# Patient Record
Sex: FEMALE | Race: WHITE | NOT HISPANIC OR LATINO
[De-identification: names, ages, dates, MRNs, and addresses within clinical notes are randomized per-mention and may not be internally consistent; named-entity substitution may affect disease eponyms.]

---

## 2017-04-09 ENCOUNTER — TRANSCRIPTION ENCOUNTER (OUTPATIENT)
Age: 82
End: 2017-04-09

## 2017-04-09 ENCOUNTER — EMERGENCY (EMERGENCY)
Facility: HOSPITAL | Age: 82
LOS: 1 days | Discharge: HOME | End: 2017-04-09
Attending: EMERGENCY MEDICINE | Admitting: EMERGENCY MEDICINE
Payer: COMMERCIAL

## 2017-04-09 VITALS
HEART RATE: 69 BPM | HEIGHT: 63 IN | RESPIRATION RATE: 16 BRPM | TEMPERATURE: 99 F | OXYGEN SATURATION: 95 % | WEIGHT: 139.99 LBS | DIASTOLIC BLOOD PRESSURE: 53 MMHG | SYSTOLIC BLOOD PRESSURE: 99 MMHG

## 2017-04-09 VITALS
HEART RATE: 78 BPM | OXYGEN SATURATION: 99 % | RESPIRATION RATE: 16 BRPM | DIASTOLIC BLOOD PRESSURE: 62 MMHG | TEMPERATURE: 99 F | SYSTOLIC BLOOD PRESSURE: 102 MMHG

## 2017-04-09 DIAGNOSIS — Z90.49 ACQUIRED ABSENCE OF OTHER SPECIFIED PARTS OF DIGESTIVE TRACT: Chronic | ICD-10-CM

## 2017-04-09 DIAGNOSIS — I10 ESSENTIAL (PRIMARY) HYPERTENSION: ICD-10-CM

## 2017-04-09 DIAGNOSIS — E03.9 HYPOTHYROIDISM, UNSPECIFIED: ICD-10-CM

## 2017-04-09 DIAGNOSIS — R50.9 FEVER, UNSPECIFIED: ICD-10-CM

## 2017-04-09 DIAGNOSIS — Z90.710 ACQUIRED ABSENCE OF BOTH CERVIX AND UTERUS: Chronic | ICD-10-CM

## 2017-04-09 DIAGNOSIS — J40 BRONCHITIS, NOT SPECIFIED AS ACUTE OR CHRONIC: ICD-10-CM

## 2017-04-09 DIAGNOSIS — Z98.89 OTHER SPECIFIED POSTPROCEDURAL STATES: Chronic | ICD-10-CM

## 2017-04-09 DIAGNOSIS — Z88.2 ALLERGY STATUS TO SULFONAMIDES: ICD-10-CM

## 2017-04-09 DIAGNOSIS — Z88.0 ALLERGY STATUS TO PENICILLIN: ICD-10-CM

## 2017-04-09 DIAGNOSIS — Z79.82 LONG TERM (CURRENT) USE OF ASPIRIN: ICD-10-CM

## 2017-04-09 LAB
ALBUMIN SERPL ELPH-MCNC: 3 G/DL — LOW (ref 3.3–5)
ALP SERPL-CCNC: 66 U/L — SIGNIFICANT CHANGE UP (ref 40–120)
ALT FLD-CCNC: 19 U/L — SIGNIFICANT CHANGE UP (ref 12–78)
ANION GAP SERPL CALC-SCNC: 8 MMOL/L — SIGNIFICANT CHANGE UP (ref 5–17)
ANISOCYTOSIS BLD QL: SLIGHT — SIGNIFICANT CHANGE UP
APPEARANCE UR: CLEAR — SIGNIFICANT CHANGE UP
AST SERPL-CCNC: 32 U/L — SIGNIFICANT CHANGE UP (ref 15–37)
BACTERIA # UR AUTO: ABNORMAL
BILIRUB SERPL-MCNC: 0.4 MG/DL — SIGNIFICANT CHANGE UP (ref 0.2–1.2)
BILIRUB UR-MCNC: NEGATIVE — SIGNIFICANT CHANGE UP
BUN SERPL-MCNC: 16 MG/DL — SIGNIFICANT CHANGE UP (ref 7–23)
CALCIUM SERPL-MCNC: 8.6 MG/DL — SIGNIFICANT CHANGE UP (ref 8.5–10.1)
CHLORIDE SERPL-SCNC: 103 MMOL/L — SIGNIFICANT CHANGE UP (ref 96–108)
CO2 SERPL-SCNC: 28 MMOL/L — SIGNIFICANT CHANGE UP (ref 22–31)
COLOR SPEC: YELLOW — SIGNIFICANT CHANGE UP
CREAT SERPL-MCNC: 0.82 MG/DL — SIGNIFICANT CHANGE UP (ref 0.5–1.3)
DIFF PNL FLD: ABNORMAL
ELLIPTOCYTES BLD QL SMEAR: SLIGHT — SIGNIFICANT CHANGE UP
EPI CELLS # UR: SIGNIFICANT CHANGE UP
GLUCOSE SERPL-MCNC: 95 MG/DL — SIGNIFICANT CHANGE UP (ref 70–99)
GLUCOSE UR QL: NEGATIVE — SIGNIFICANT CHANGE UP
HCT VFR BLD CALC: 40 % — SIGNIFICANT CHANGE UP (ref 34.5–45)
HGB BLD-MCNC: 13.3 G/DL — SIGNIFICANT CHANGE UP (ref 11.5–15.5)
HYPOCHROMIA BLD QL: SLIGHT — SIGNIFICANT CHANGE UP
INR BLD: 1.08 RATIO — SIGNIFICANT CHANGE UP (ref 0.88–1.16)
KETONES UR-MCNC: NEGATIVE — SIGNIFICANT CHANGE UP
LACTATE SERPL-SCNC: 1 MMOL/L — SIGNIFICANT CHANGE UP (ref 0.7–2)
LEUKOCYTE ESTERASE UR-ACNC: NEGATIVE — SIGNIFICANT CHANGE UP
LG PLATELETS BLD QL AUTO: SLIGHT — SIGNIFICANT CHANGE UP
LYMPHOCYTES # BLD AUTO: 17 % — SIGNIFICANT CHANGE UP (ref 13–44)
MCHC RBC-ENTMCNC: 32.2 PG — SIGNIFICANT CHANGE UP (ref 27–34)
MCHC RBC-ENTMCNC: 33.1 GM/DL — SIGNIFICANT CHANGE UP (ref 32–36)
MCV RBC AUTO: 97.3 FL — SIGNIFICANT CHANGE UP (ref 80–100)
MONOCYTES NFR BLD AUTO: 9 % — SIGNIFICANT CHANGE UP (ref 1–9)
NEUTROPHILS NFR BLD AUTO: 74 % — SIGNIFICANT CHANGE UP (ref 43–77)
NITRITE UR-MCNC: NEGATIVE — SIGNIFICANT CHANGE UP
PH UR: 5 — SIGNIFICANT CHANGE UP (ref 4.8–8)
PLAT MORPH BLD: NORMAL — SIGNIFICANT CHANGE UP
PLATELET # BLD AUTO: 204 K/UL — SIGNIFICANT CHANGE UP (ref 150–400)
POIKILOCYTOSIS BLD QL AUTO: SLIGHT — SIGNIFICANT CHANGE UP
POLYCHROMASIA BLD QL SMEAR: SLIGHT — SIGNIFICANT CHANGE UP
POTASSIUM SERPL-MCNC: 4 MMOL/L — SIGNIFICANT CHANGE UP (ref 3.5–5.3)
POTASSIUM SERPL-SCNC: 4 MMOL/L — SIGNIFICANT CHANGE UP (ref 3.5–5.3)
PROT SERPL-MCNC: 7.4 G/DL — SIGNIFICANT CHANGE UP (ref 6–8.3)
PROT UR-MCNC: NEGATIVE — SIGNIFICANT CHANGE UP
PROTHROM AB SERPL-ACNC: 11.8 SEC — SIGNIFICANT CHANGE UP (ref 9.8–12.7)
RAPID RVP RESULT: SIGNIFICANT CHANGE UP
RBC # BLD: 4.12 M/UL — SIGNIFICANT CHANGE UP (ref 3.8–5.2)
RBC # FLD: 12.5 % — SIGNIFICANT CHANGE UP (ref 10.3–14.5)
RBC BLD AUTO: SIGNIFICANT CHANGE UP
RBC CASTS # UR COMP ASSIST: SIGNIFICANT CHANGE UP /HPF (ref 0–4)
SODIUM SERPL-SCNC: 139 MMOL/L — SIGNIFICANT CHANGE UP (ref 135–145)
SP GR SPEC: 1 — LOW (ref 1.01–1.02)
UROBILINOGEN FLD QL: NEGATIVE — SIGNIFICANT CHANGE UP
WBC # BLD: 14.7 K/UL — HIGH (ref 3.8–10.5)
WBC # FLD AUTO: 14.7 K/UL — HIGH (ref 3.8–10.5)
WBC UR QL: SIGNIFICANT CHANGE UP

## 2017-04-09 PROCEDURE — 99284 EMERGENCY DEPT VISIT MOD MDM: CPT

## 2017-04-09 PROCEDURE — 99284 EMERGENCY DEPT VISIT MOD MDM: CPT | Mod: 25

## 2017-04-09 PROCEDURE — 87798 DETECT AGENT NOS DNA AMP: CPT

## 2017-04-09 PROCEDURE — 87040 BLOOD CULTURE FOR BACTERIA: CPT

## 2017-04-09 PROCEDURE — 87581 M.PNEUMON DNA AMP PROBE: CPT

## 2017-04-09 PROCEDURE — 71020: CPT | Mod: 26

## 2017-04-09 PROCEDURE — 87086 URINE CULTURE/COLONY COUNT: CPT

## 2017-04-09 PROCEDURE — 83605 ASSAY OF LACTIC ACID: CPT

## 2017-04-09 PROCEDURE — 87633 RESP VIRUS 12-25 TARGETS: CPT

## 2017-04-09 PROCEDURE — 85610 PROTHROMBIN TIME: CPT

## 2017-04-09 PROCEDURE — 94640 AIRWAY INHALATION TREATMENT: CPT

## 2017-04-09 PROCEDURE — 71046 X-RAY EXAM CHEST 2 VIEWS: CPT

## 2017-04-09 PROCEDURE — 81001 URINALYSIS AUTO W/SCOPE: CPT

## 2017-04-09 PROCEDURE — 80053 COMPREHEN METABOLIC PANEL: CPT

## 2017-04-09 PROCEDURE — 85027 COMPLETE CBC AUTOMATED: CPT

## 2017-04-09 PROCEDURE — 87486 CHLMYD PNEUM DNA AMP PROBE: CPT

## 2017-04-09 RX ORDER — IPRATROPIUM/ALBUTEROL SULFATE 18-103MCG
3 AEROSOL WITH ADAPTER (GRAM) INHALATION ONCE
Qty: 0 | Refills: 0 | Status: COMPLETED | OUTPATIENT
Start: 2017-04-09 | End: 2017-04-09

## 2017-04-09 RX ORDER — SODIUM CHLORIDE 9 MG/ML
1000 INJECTION INTRAMUSCULAR; INTRAVENOUS; SUBCUTANEOUS ONCE
Qty: 0 | Refills: 0 | Status: COMPLETED | OUTPATIENT
Start: 2017-04-09 | End: 2017-04-09

## 2017-04-09 RX ADMIN — Medication 3 MILLILITER(S): at 17:14

## 2017-04-09 RX ADMIN — SODIUM CHLORIDE 1000 MILLILITER(S): 9 INJECTION INTRAMUSCULAR; INTRAVENOUS; SUBCUTANEOUS at 17:18

## 2017-04-09 RX ADMIN — SODIUM CHLORIDE 1000 MILLILITER(S): 9 INJECTION INTRAMUSCULAR; INTRAVENOUS; SUBCUTANEOUS at 19:24

## 2017-04-09 NOTE — ED ADULT TRIAGE NOTE - CHIEF COMPLAINT QUOTE
pt sent from urgent care, pt with cough/fever/weakness, on z pack without improvement, given Tylenol at 1400

## 2017-04-09 NOTE — ED PROVIDER NOTE - OBJECTIVE STATEMENT
85 female presents to ER with daughter, sent from urgent care for evaluation of cough, fever for the past 5 days. Patient had gone to her PMD on Thursday placed on Z-pack.

## 2017-04-09 NOTE — ED PROVIDER NOTE - PROGRESS NOTE DETAILS
patient feeling better, called Dr. Azar PMD/infectious disease, case discussed, aware of elevated white count 14.7, states to continue z-pack, to give cough medication, will see in office tomorrow, patient agrees, understands to return to ER if having elevated fever, difficulty breathing or worsening of symptoms

## 2017-04-10 ENCOUNTER — INPATIENT (INPATIENT)
Facility: HOSPITAL | Age: 82
LOS: 2 days | Discharge: HOME | DRG: 871 | End: 2017-04-13
Attending: HOSPITALIST | Admitting: HOSPITALIST
Payer: COMMERCIAL

## 2017-04-10 VITALS
TEMPERATURE: 98 F | WEIGHT: 130.07 LBS | OXYGEN SATURATION: 99 % | RESPIRATION RATE: 16 BRPM | DIASTOLIC BLOOD PRESSURE: 64 MMHG | HEIGHT: 62 IN | HEART RATE: 74 BPM | SYSTOLIC BLOOD PRESSURE: 137 MMHG

## 2017-04-10 DIAGNOSIS — Z90.710 ACQUIRED ABSENCE OF BOTH CERVIX AND UTERUS: Chronic | ICD-10-CM

## 2017-04-10 DIAGNOSIS — A41.9 SEPSIS, UNSPECIFIED ORGANISM: ICD-10-CM

## 2017-04-10 DIAGNOSIS — E03.9 HYPOTHYROIDISM, UNSPECIFIED: ICD-10-CM

## 2017-04-10 DIAGNOSIS — I10 ESSENTIAL (PRIMARY) HYPERTENSION: ICD-10-CM

## 2017-04-10 DIAGNOSIS — Z98.89 OTHER SPECIFIED POSTPROCEDURAL STATES: Chronic | ICD-10-CM

## 2017-04-10 DIAGNOSIS — Z79.01 LONG TERM (CURRENT) USE OF ANTICOAGULANTS: ICD-10-CM

## 2017-04-10 DIAGNOSIS — Z90.49 ACQUIRED ABSENCE OF OTHER SPECIFIED PARTS OF DIGESTIVE TRACT: Chronic | ICD-10-CM

## 2017-04-10 LAB
ALBUMIN SERPL ELPH-MCNC: 2.9 G/DL — LOW (ref 3.3–5)
ALP SERPL-CCNC: 65 U/L — SIGNIFICANT CHANGE UP (ref 40–120)
ALT FLD-CCNC: 18 U/L — SIGNIFICANT CHANGE UP (ref 12–78)
ANION GAP SERPL CALC-SCNC: 9 MMOL/L — SIGNIFICANT CHANGE UP (ref 5–17)
AST SERPL-CCNC: 32 U/L — SIGNIFICANT CHANGE UP (ref 15–37)
BASOPHILS # BLD AUTO: 0.2 K/UL — SIGNIFICANT CHANGE UP (ref 0–0.2)
BASOPHILS NFR BLD AUTO: 1.1 % — SIGNIFICANT CHANGE UP (ref 0–2)
BILIRUB SERPL-MCNC: 0.4 MG/DL — SIGNIFICANT CHANGE UP (ref 0.2–1.2)
BUN SERPL-MCNC: 13 MG/DL — SIGNIFICANT CHANGE UP (ref 7–23)
CALCIUM SERPL-MCNC: 8.3 MG/DL — LOW (ref 8.5–10.1)
CHLORIDE SERPL-SCNC: 103 MMOL/L — SIGNIFICANT CHANGE UP (ref 96–108)
CO2 SERPL-SCNC: 27 MMOL/L — SIGNIFICANT CHANGE UP (ref 22–31)
CREAT SERPL-MCNC: 0.62 MG/DL — SIGNIFICANT CHANGE UP (ref 0.5–1.3)
EOSINOPHIL # BLD AUTO: 0.2 K/UL — SIGNIFICANT CHANGE UP (ref 0–0.5)
EOSINOPHIL NFR BLD AUTO: 1 % — SIGNIFICANT CHANGE UP (ref 0–6)
GLUCOSE SERPL-MCNC: 92 MG/DL — SIGNIFICANT CHANGE UP (ref 70–99)
HCT VFR BLD CALC: 36.6 % — SIGNIFICANT CHANGE UP (ref 34.5–45)
HGB BLD-MCNC: 12 G/DL — SIGNIFICANT CHANGE UP (ref 11.5–15.5)
INR BLD: 1.14 RATIO — SIGNIFICANT CHANGE UP (ref 0.88–1.16)
LACTATE SERPL-SCNC: 0.9 MMOL/L — SIGNIFICANT CHANGE UP (ref 0.7–2)
LACTATE SERPL-SCNC: 1.9 MMOL/L — SIGNIFICANT CHANGE UP (ref 0.7–2)
LYMPHOCYTES # BLD AUTO: 14.9 % — SIGNIFICANT CHANGE UP (ref 13–44)
LYMPHOCYTES # BLD AUTO: 2.5 K/UL — SIGNIFICANT CHANGE UP (ref 1–3.3)
MCHC RBC-ENTMCNC: 32.1 PG — SIGNIFICANT CHANGE UP (ref 27–34)
MCHC RBC-ENTMCNC: 32.8 GM/DL — SIGNIFICANT CHANGE UP (ref 32–36)
MCV RBC AUTO: 97.9 FL — SIGNIFICANT CHANGE UP (ref 80–100)
MONOCYTES # BLD AUTO: 1.9 K/UL — HIGH (ref 0–0.9)
MONOCYTES NFR BLD AUTO: 11.7 % — HIGH (ref 1–9)
NEUTROPHILS # BLD AUTO: 11.8 K/UL — HIGH (ref 1.8–7.4)
NEUTROPHILS NFR BLD AUTO: 71.2 % — SIGNIFICANT CHANGE UP (ref 43–77)
PLATELET # BLD AUTO: 211 K/UL — SIGNIFICANT CHANGE UP (ref 150–400)
POTASSIUM SERPL-MCNC: 4.1 MMOL/L — SIGNIFICANT CHANGE UP (ref 3.5–5.3)
POTASSIUM SERPL-SCNC: 4.1 MMOL/L — SIGNIFICANT CHANGE UP (ref 3.5–5.3)
PROT SERPL-MCNC: 6.9 G/DL — SIGNIFICANT CHANGE UP (ref 6–8.3)
PROTHROM AB SERPL-ACNC: 12.5 SEC — SIGNIFICANT CHANGE UP (ref 9.8–12.7)
RBC # BLD: 3.73 M/UL — LOW (ref 3.8–5.2)
RBC # FLD: 12.8 % — SIGNIFICANT CHANGE UP (ref 10.3–14.5)
SODIUM SERPL-SCNC: 139 MMOL/L — SIGNIFICANT CHANGE UP (ref 135–145)
WBC # BLD: 16.6 K/UL — HIGH (ref 3.8–10.5)
WBC # FLD AUTO: 16.6 K/UL — HIGH (ref 3.8–10.5)

## 2017-04-10 PROCEDURE — 93010 ELECTROCARDIOGRAM REPORT: CPT

## 2017-04-10 PROCEDURE — 99285 EMERGENCY DEPT VISIT HI MDM: CPT

## 2017-04-10 PROCEDURE — 99223 1ST HOSP IP/OBS HIGH 75: CPT | Mod: AI,GC

## 2017-04-10 RX ORDER — AZTREONAM 2 G
1000 VIAL (EA) INJECTION EVERY 8 HOURS
Qty: 0 | Refills: 0 | Status: DISCONTINUED | OUTPATIENT
Start: 2017-04-10 | End: 2017-04-13

## 2017-04-10 RX ORDER — VANCOMYCIN HCL 1 G
1000 VIAL (EA) INTRAVENOUS ONCE
Qty: 0 | Refills: 0 | Status: COMPLETED | OUTPATIENT
Start: 2017-04-10 | End: 2017-04-10

## 2017-04-10 RX ORDER — VANCOMYCIN HCL 1 G
1000 VIAL (EA) INTRAVENOUS EVERY 12 HOURS
Qty: 0 | Refills: 0 | Status: DISCONTINUED | OUTPATIENT
Start: 2017-04-10 | End: 2017-04-12

## 2017-04-10 RX ORDER — LEVOTHYROXINE SODIUM 125 MCG
50 TABLET ORAL DAILY
Qty: 0 | Refills: 0 | Status: DISCONTINUED | OUTPATIENT
Start: 2017-04-10 | End: 2017-04-13

## 2017-04-10 RX ORDER — LISINOPRIL 2.5 MG/1
10 TABLET ORAL DAILY
Qty: 0 | Refills: 0 | Status: DISCONTINUED | OUTPATIENT
Start: 2017-04-10 | End: 2017-04-13

## 2017-04-10 RX ORDER — IPRATROPIUM/ALBUTEROL SULFATE 18-103MCG
3 AEROSOL WITH ADAPTER (GRAM) INHALATION ONCE
Qty: 0 | Refills: 0 | Status: COMPLETED | OUTPATIENT
Start: 2017-04-10 | End: 2017-04-10

## 2017-04-10 RX ORDER — ENOXAPARIN SODIUM 100 MG/ML
40 INJECTION SUBCUTANEOUS EVERY 24 HOURS
Qty: 0 | Refills: 0 | Status: DISCONTINUED | OUTPATIENT
Start: 2017-04-10 | End: 2017-04-13

## 2017-04-10 RX ORDER — ACETAMINOPHEN 500 MG
650 TABLET ORAL ONCE
Qty: 0 | Refills: 0 | Status: COMPLETED | OUTPATIENT
Start: 2017-04-10 | End: 2017-04-10

## 2017-04-10 RX ORDER — AZTREONAM 2 G
1000 VIAL (EA) INJECTION ONCE
Qty: 0 | Refills: 0 | Status: COMPLETED | OUTPATIENT
Start: 2017-04-10 | End: 2017-04-10

## 2017-04-10 RX ORDER — LORATADINE 10 MG/1
10 TABLET ORAL DAILY
Qty: 0 | Refills: 0 | Status: DISCONTINUED | OUTPATIENT
Start: 2017-04-10 | End: 2017-04-13

## 2017-04-10 RX ORDER — METOPROLOL TARTRATE 50 MG
50 TABLET ORAL DAILY
Qty: 0 | Refills: 0 | Status: DISCONTINUED | OUTPATIENT
Start: 2017-04-10 | End: 2017-04-13

## 2017-04-10 RX ORDER — SODIUM CHLORIDE 9 MG/ML
1000 INJECTION INTRAMUSCULAR; INTRAVENOUS; SUBCUTANEOUS ONCE
Qty: 0 | Refills: 0 | Status: COMPLETED | OUTPATIENT
Start: 2017-04-10 | End: 2017-04-10

## 2017-04-10 RX ADMIN — Medication 650 MILLIGRAM(S): at 13:09

## 2017-04-10 RX ADMIN — SODIUM CHLORIDE 1000 MILLILITER(S): 9 INJECTION INTRAMUSCULAR; INTRAVENOUS; SUBCUTANEOUS at 12:53

## 2017-04-10 RX ADMIN — Medication 3 MILLILITER(S): at 13:09

## 2017-04-10 RX ADMIN — Medication 50 MILLIGRAM(S): at 12:55

## 2017-04-10 RX ADMIN — Medication 100 MILLIGRAM(S): at 20:24

## 2017-04-10 RX ADMIN — Medication 250 MILLIGRAM(S): at 13:40

## 2017-04-10 RX ADMIN — Medication 3 MILLILITER(S): at 15:09

## 2017-04-10 RX ADMIN — ENOXAPARIN SODIUM 40 MILLIGRAM(S): 100 INJECTION SUBCUTANEOUS at 18:09

## 2017-04-10 RX ADMIN — Medication 50 MILLIGRAM(S): at 20:51

## 2017-04-10 NOTE — CONSULT NOTE ADULT - SUBJECTIVE AND OBJECTIVE BOX
Patient is a 85y old  Female who presents with a chief complaint of cough and weakness (10 Apr 2017 14:26)      HPI:  85F pmhx hepatitis, hypothyroid, sicca syndrome and htn. c/o of weakness and cough.  Weakness started one week ago, states for the past year has been having symptoms of weakness with increase falls (most recent fall two days ago trying to get out of bed).  States daughter has given her natural energy boost medicines and states it has helped with energy.  States full body fatigue and tenderness progressively worsening with weakness.  No other associated symptoms. Cough also started about a week ago, that progressively worsened, was given antibiotics by dr mcgowan and is on day 3/5 as of today.  States associated throat tenderness in the "glands" with some groin discomfort.  Admits fevers but denies nausea, vomitting or diarrhea.    allergies to penicillin --> rash.    As per ER attending who spoke with PMD, dr mcgowan, was prelim blood culture is gram positive cocci.  Dr mcgowan recommended vanco and zosyn, due to pen allergy, started on vanco and aztreonam. (10 Apr 2017 14:26)      Asked to see patient for ID Consult    Allergies    penicillin (Unknown)  sulfa drugs (Unknown)    Intolerances        MEDICATIONS  (STANDING):  lisinopril 10milliGRAM(s) Oral daily  loratadine 10milliGRAM(s) Oral daily  metoprolol succinate ER 50milliGRAM(s) Oral daily  levothyroxine 50MICROGram(s) Oral daily  enoxaparin Injectable 40milliGRAM(s) SubCutaneous every 24 hours  aztreonam  IVPB 1000milliGRAM(s) IV Intermittent every 8 hours  vancomycin  IVPB 1000milliGRAM(s) IV Intermittent every 12 hours    MEDICATIONS  (PRN):  guaiFENesin    Syrup 100milliGRAM(s) Oral every 6 hours PRN Cough      PAST MEDICAL & SURGICAL HISTORY:  Autoimmune disease  Hepatitis: 20 years ago  Hypothyroid  Essential hypertension  H/O abdominal hysterectomy  History of cholecystectomy  S/P appy      FAMILY HISTORY:  Family history of stroke  Family history of heart disease      Social History    Smoking History:  YES[  ]  NO[ x ]    REVIEW OF SYSTEMS:  All systems below were reviewed and are negative [  ]  Constitutional:weakness  HEENT:  Lymph nodes:  ID:  Pulmonary:dry cough  Cardiac:  GI:  Renal:  Musculoskeletal:pain b/l groin  Neuro:  Endocrine:  Skin:  All other systems above were reviewed and are negative   [ x ]    HOME MEDICATIONS:    MEDICATIONS  (STANDING):  lisinopril 10milliGRAM(s) Oral daily  loratadine 10milliGRAM(s) Oral daily  metoprolol succinate ER 50milliGRAM(s) Oral daily  levothyroxine 50MICROGram(s) Oral daily  enoxaparin Injectable 40milliGRAM(s) SubCutaneous every 24 hours  aztreonam  IVPB 1000milliGRAM(s) IV Intermittent every 8 hours  vancomycin  IVPB 1000milliGRAM(s) IV Intermittent every 12 hours    MEDICATIONS  (PRN):  guaiFENesin    Syrup 100milliGRAM(s) Oral every 6 hours PRN Cough        Vital Signs Last 24 Hrs  T(C): 37.2, Max: 38.2 (04-10 @ 13:00)  T(F): 98.9, Max: 100.7 (04-10 @ 13:00)  HR: 62 (62 - 74)  BP: 113/67 (113/67 - 137/64)  BP(mean): --  RR: 15 (15 - 16)  SpO2: 98% (98% - 99%)    PHYSICAL EXAM:  Constitutional:weak  HEENT:  Neck:  Lymph Nodes:  Lungs:coarse BS b/l  Heart:  Abdomen:  Renal:  Extremities:pain b/l groins  Neurologic:  Vascular:  Neuro:  Endocrine:  Skin:erythema face on admission  Except for written comments, all of above normal [ x ]    I&O's Summary      LABORATORY:    CBC Full  -  ( 10 Apr 2017 12:52 )  WBC Count : 16.6 K/uL  Hemoglobin : 12.0 g/dL  Hematocrit : 36.6 %  Platelet Count - Automated : 211 K/uL  Mean Cell Volume : 97.9 fl  Mean Cell Hemoglobin : 32.1 pg  Mean Cell Hemoglobin Concentration : 32.8 gm/dL  Auto Neutrophil # : 11.8 K/uL  Auto Lymphocyte # : 2.5 K/uL  Auto Monocyte # : 1.9 K/uL  Auto Eosinophil # : 0.2 K/uL  Auto Basophil # : 0.2 K/uL  Auto Neutrophil % : 71.2 %  Auto Lymphocyte % : 14.9 %  Auto Monocyte % : 11.7 %  Auto Eosinophil % : 1.0 %  Auto Basophil % : 1.1 %          04-10    139  |  103  |  13  ----------------------------<  92  4.1   |  27  |  0.62    Ca    8.3<L>      10 Apr 2017 12:52    TPro  6.9  /  Alb  2.9<L>  /  TBili  0.4  /  DBili  x   /  AST  32  /  ALT  18  /  AlkPhos  65  04-10      Urinalysis basic    LABORATORY:    CBC Full  -  ( 10 Apr 2017 12:52 )  WBC Count : 16.6 K/uL  Hemoglobin : 12.0 g/dL  Hematocrit : 36.6 %  Platelet Count - Automated : 211 K/uL  Mean Cell Volume : 97.9 fl  Mean Cell Hemoglobin : 32.1 pg  Mean Cell Hemoglobin Concentration : 32.8 gm/dL  Auto Neutrophil # : 11.8 K/uL  Auto Lymphocyte # : 2.5 K/uL  Auto Monocyte # : 1.9 K/uL  Auto Eosinophil # : 0.2 K/uL  Auto Basophil # : 0.2 K/uL  Auto Neutrophil % : 71.2 %  Auto Lymphocyte % : 14.9 %  Auto Monocyte % : 11.7 %  Auto Eosinophil % : 1.0 %  Auto Basophil % : 1.1 %          04-10    139  |  103  |  13  ----------------------------<  92  4.1   |  27  |  0.62    Ca    8.3<L>      10 Apr 2017 12:52    TPro  6.9  /  Alb  2.9<L>  /  TBili  0.4  /  DBili  x   /  AST  32  /  ALT  18  /  AlkPhos  65  04-10      Rapid Respiratory Viral Panel Result        04-09 @ 21:05  Rapid RVP [facility]  Coronovirus --  Adenovirus --  Bordetella Pertussis --  Chlamydia Pneumonia --  Entero/Rhinovirus--  HKU1 Coronovirus --  HMPV Coronovirus --  Influenza A --  Influenza AH1 --  Influenza AH1 2009 --  Influenza AH3 --  Influenza B --  Mycoplasma Pneumoniae --  NL63 Coronovirus --  OC43 Coronovirus --  Parainfluenza 1 --  Parainfluenza 2 --  Parainfluenza 3 --  Parainfluenza 4 --  Resp Syncytial Virus --          Vanco. trough:  Genta trough:    Radiology:    Microbiology:g+c in blood cx from 4/7

## 2017-04-10 NOTE — H&P ADULT - NSHPLABSRESULTS_GEN_ALL_CORE
EKG: sinus with 1st degree AV block, posible inferior infarct age undetermined. 79bpm. CBC: wbc = 14.7 (4/9/17) --> 16.6 (4/10/17)    Rapid RVP (4/9/17) : not detected;     Xray chest 2 view (4/9/17): IMPRESSION:  Support Devices: None  Heart: Cardiomegaly  Mediastinum: Normal in contour  Lungs/Airways: No consolidation or pleural effusion. Reticular opacities   at the right base, indeterminate for infection.  Musculoskeletal: Normal      EKG (4/10/17): sinus with 1st degree AV block, posible inferior infarct age undetermined. 79bpm.

## 2017-04-10 NOTE — ED ADULT TRIAGE NOTE - CHIEF COMPLAINT QUOTE
C/O Weakness, dehydration and cough. patient was here yesterday for the same. Patient will be admitted as per Dr. Azar.

## 2017-04-10 NOTE — H&P ADULT - FAMILY HISTORY
Mother  Still living? Unknown  Family history of heart disease, Age at diagnosis: Age Unknown     Father  Still living? Unknown  Family history of stroke, Age at diagnosis: Age Unknown

## 2017-04-10 NOTE — PATIENT PROFILE ADULT. - NS TRANSFER PATIENT BELONGINGS
Cell Phone/PDA (specify)/Clothing/Jewelry/Money (specify)/1 necklace,1 miraculous metal
PAIN SCALE 10 OF 10.

## 2017-04-10 NOTE — PATIENT PROFILE ADULT. - NSALCOHOLMD_GEN_A_CORE_SD
Name of MD Notified: (Please Specify)/all questions answered NO by pt, I accidentally checked this box

## 2017-04-10 NOTE — H&P ADULT - HISTORY OF PRESENT ILLNESS
85F pmhx hepatitis, hypothyroid and htn c/o of weakness and cough.  Weakness started one week ago, states for the past year has been having symptoms of weakness with increase falls (most recent fall two days ago trying to get out of bed).  States daughter has given her natural energy boost medicines and states it has helped with energy.  States full body fatigue and tenderness progressively worsening with weakness.  No other associated symptoms. Cough also started about a week ago, that progressively worsened, was given antibiotics by dr mcgowan and is on day 3/5 as of today.  States associated throat tenderness in the "glands" with some groin discomfort.  Admits fevers but denies nausea, vomitting or diarrhea.    allergies to penicillin --> rash 85F pmhx hepatitis, hypothyroid and htn c/o of weakness and cough.  Weakness started one week ago, states for the past year has been having symptoms of weakness with increase falls (most recent fall two days ago trying to get out of bed).  States daughter has given her natural energy boost medicines and states it has helped with energy.  States full body fatigue and tenderness progressively worsening with weakness.  No other associated symptoms. Cough also started about a week ago, that progressively worsened, was given antibiotics by dr mcgowan and is on day 3/5 as of today.  States associated throat tenderness in the "glands" with some groin discomfort.  Admits fevers but denies nausea, vomitting or diarrhea.    allergies to penicillin --> rash.    As per ER attending who spoke with PMD, dr mcgowan, was prelim blood culture is gram positive cocci.  Dr mcgowan recommended vanco and zosyn, due to pen allergy, started on vanco and aztreonam.

## 2017-04-10 NOTE — ED PROVIDER NOTE - OBJECTIVE STATEMENT
85 female sent to ER to be admitted by PMD. Patient has been having productive cough, congestion last week, worsening over the weekend, with fever 100.7F, came to ER yesterday and sent home, today PMD states blood cultures were positive and to start broad spectrum antibiotics.  PMD- Nissa

## 2017-04-10 NOTE — H&P ADULT - PROBLEM SELECTOR PLAN 1
source unknown  1. wbc elevated since ER visit  2. started on aztreonam and vancomycin as recommended by ID (juan m)  3. will f/u cultures

## 2017-04-10 NOTE — H&P ADULT - ASSESSMENT
85F pmhx hepatitis, hypothyroid and htn admitted for sepsis unknown source. 85F pmhx hepatitis, hypothyroid and htn admitted for sepsis/ gram positive bacteremia , unclear source.

## 2017-04-10 NOTE — PATIENT PROFILE ADULT. - FALL HARM RISK
other/bones(Osteoporosis,prev fx,steroid use,metastatic bone ca/s/p fall at home 4/08/2017/age(85 years old or older)

## 2017-04-11 LAB
ANION GAP SERPL CALC-SCNC: 7 MMOL/L — SIGNIFICANT CHANGE UP (ref 5–17)
BASOPHILS # BLD AUTO: 0.2 K/UL — SIGNIFICANT CHANGE UP (ref 0–0.2)
BASOPHILS NFR BLD AUTO: 1 % — SIGNIFICANT CHANGE UP (ref 0–2)
BUN SERPL-MCNC: 7 MG/DL — SIGNIFICANT CHANGE UP (ref 7–23)
CALCIUM SERPL-MCNC: 7.9 MG/DL — LOW (ref 8.5–10.1)
CHLORIDE SERPL-SCNC: 106 MMOL/L — SIGNIFICANT CHANGE UP (ref 96–108)
CO2 SERPL-SCNC: 27 MMOL/L — SIGNIFICANT CHANGE UP (ref 22–31)
CREAT SERPL-MCNC: 0.63 MG/DL — SIGNIFICANT CHANGE UP (ref 0.5–1.3)
CULTURE RESULTS: NO GROWTH — SIGNIFICANT CHANGE UP
EOSINOPHIL # BLD AUTO: 0.2 K/UL — SIGNIFICANT CHANGE UP (ref 0–0.5)
EOSINOPHIL NFR BLD AUTO: 1.6 % — SIGNIFICANT CHANGE UP (ref 0–6)
GLUCOSE SERPL-MCNC: 94 MG/DL — SIGNIFICANT CHANGE UP (ref 70–99)
HCT VFR BLD CALC: 35.9 % — SIGNIFICANT CHANGE UP (ref 34.5–45)
HGB BLD-MCNC: 11.3 G/DL — LOW (ref 11.5–15.5)
LYMPHOCYTES # BLD AUTO: 24.1 % — SIGNIFICANT CHANGE UP (ref 13–44)
LYMPHOCYTES # BLD AUTO: 3.5 K/UL — HIGH (ref 1–3.3)
MCHC RBC-ENTMCNC: 30.6 PG — SIGNIFICANT CHANGE UP (ref 27–34)
MCHC RBC-ENTMCNC: 31.4 GM/DL — LOW (ref 32–36)
MCV RBC AUTO: 97.4 FL — SIGNIFICANT CHANGE UP (ref 80–100)
MONOCYTES # BLD AUTO: 1.3 K/UL — HIGH (ref 0–0.9)
MONOCYTES NFR BLD AUTO: 8.8 % — SIGNIFICANT CHANGE UP (ref 1–9)
NEUTROPHILS # BLD AUTO: 9.4 K/UL — HIGH (ref 1.8–7.4)
NEUTROPHILS NFR BLD AUTO: 64.4 % — SIGNIFICANT CHANGE UP (ref 43–77)
PLATELET # BLD AUTO: 208 K/UL — SIGNIFICANT CHANGE UP (ref 150–400)
POTASSIUM SERPL-MCNC: 4.2 MMOL/L — SIGNIFICANT CHANGE UP (ref 3.5–5.3)
POTASSIUM SERPL-SCNC: 4.2 MMOL/L — SIGNIFICANT CHANGE UP (ref 3.5–5.3)
RBC # BLD: 3.69 M/UL — LOW (ref 3.8–5.2)
RBC # FLD: 12.6 % — SIGNIFICANT CHANGE UP (ref 10.3–14.5)
SODIUM SERPL-SCNC: 140 MMOL/L — SIGNIFICANT CHANGE UP (ref 135–145)
SPECIMEN SOURCE: SIGNIFICANT CHANGE UP
WBC # BLD: 14.6 K/UL — HIGH (ref 3.8–10.5)
WBC # FLD AUTO: 14.6 K/UL — HIGH (ref 3.8–10.5)

## 2017-04-11 PROCEDURE — 99233 SBSQ HOSP IP/OBS HIGH 50: CPT | Mod: GC

## 2017-04-11 RX ORDER — LACTOBACILLUS ACIDOPHILUS 100MM CELL
1 CAPSULE ORAL
Qty: 0 | Refills: 0 | Status: DISCONTINUED | OUTPATIENT
Start: 2017-04-11 | End: 2017-04-13

## 2017-04-11 RX ORDER — ACETAMINOPHEN 500 MG
650 TABLET ORAL ONCE
Qty: 0 | Refills: 0 | Status: COMPLETED | OUTPATIENT
Start: 2017-04-11 | End: 2017-04-11

## 2017-04-11 RX ADMIN — Medication 50 MICROGRAM(S): at 06:36

## 2017-04-11 RX ADMIN — Medication 50 MILLIGRAM(S): at 04:30

## 2017-04-11 RX ADMIN — Medication 250 MILLIGRAM(S): at 02:10

## 2017-04-11 RX ADMIN — ENOXAPARIN SODIUM 40 MILLIGRAM(S): 100 INJECTION SUBCUTANEOUS at 19:01

## 2017-04-11 RX ADMIN — LISINOPRIL 10 MILLIGRAM(S): 2.5 TABLET ORAL at 06:36

## 2017-04-11 RX ADMIN — Medication 50 MILLIGRAM(S): at 13:08

## 2017-04-11 RX ADMIN — Medication 250 MILLIGRAM(S): at 13:05

## 2017-04-11 RX ADMIN — Medication 650 MILLIGRAM(S): at 07:16

## 2017-04-11 RX ADMIN — Medication 50 MILLIGRAM(S): at 06:36

## 2017-04-11 RX ADMIN — Medication 50 MILLIGRAM(S): at 20:53

## 2017-04-11 RX ADMIN — Medication 1 TABLET(S): at 19:00

## 2017-04-11 NOTE — GOALS OF CARE CONVERSATION - PERSONAL ADVANCE DIRECTIVE - CONVERSATION DETAILS
spoke to pt, may have hcp at home, contacted Amina, daughter on phone, no hcp from , will discuss hcp form w pt and may complete while here. Amina has had conversations w pt and know her wishes.  contact # given

## 2017-04-11 NOTE — PROGRESS NOTE ADULT - SUBJECTIVE AND OBJECTIVE BOX
Patient is a 85y old  Female who presents with a chief complaint of cough and weakness (10 Apr 2017 20:36)    Asked to see patient for ID Consult  Interval History:sepsis.weakness. bronchitis    CENTRAL LINE:   [  ] YES       [ x ] NO  FRANKLIN:                 [  ] YES       [ x ] NO     PAST MEDICAL & SURGICAL HISTORY:  Autoimmune disease  Hepatitis: 20 years ago  Hypothyroid  Essential hypertension  H/O abdominal hysterectomy  History of cholecystectomy  S/P appy      REVIEW OF SYSTEMS:  All systems below were reviewed and are negative [  ]  Constitutional:weak  HEENT:  Lymph nodes:  ID:  Pulmonary:dry cough  Cardiac:no CP,SOB  GI:No NVD  Renal:  Musculoskeletal:  Neuro:  Endocrine:  Skin:  All other systems above were reviewed and are negative   [ x ]    Allergies  Allergies    latex (Rash)  penicillin (Unknown)  sulfa drugs (Unknown)    Intolerances        MEDICATIONS  (STANDING):  lisinopril 10milliGRAM(s) Oral daily  loratadine 10milliGRAM(s) Oral daily  metoprolol succinate ER 50milliGRAM(s) Oral daily  levothyroxine 50MICROGram(s) Oral daily  enoxaparin Injectable 40milliGRAM(s) SubCutaneous every 24 hours  aztreonam  IVPB 1000milliGRAM(s) IV Intermittent every 8 hours  vancomycin  IVPB 1000milliGRAM(s) IV Intermittent every 12 hours    MEDICATIONS  (PRN):  guaiFENesin    Syrup 100milliGRAM(s) Oral every 6 hours PRN Cough      Vital Signs Last 24 Hrs  T(C): 38.1, Max: 38.2 (04-10 @ 13:00)  T(F): 100.5, Max: 100.7 (04-10 @ 13:00)  HR: 86 (62 - 86)  BP: 120/65 (113/67 - 137/64)  BP(mean): --  RR: 17 (14 - 17)  SpO2: 93% (93% - 99%)    I&O's Summary    I & Os for current day (as of 2017 08:18)  =============================================  IN: 300 ml / OUT: 0 ml / NET: 300 ml      PHYSICAL EXAM:  Constitutional:weak  HEENT:  Lymph nodes:  Neck:  Lungs:dec. rhonchi  Heart:  Abdomen:  Extremities:  Neurologic:  Vascular:  Neuro:  Endocrine:  Skin:  All of the above normal except for written comments [ x ]    LABORATORY:    CBC Full  -  ( 10 Apr 2017 12:52 )  WBC Count : 16.6 K/uL  Hemoglobin : 12.0 g/dL  Hematocrit : 36.6 %  Platelet Count - Automated : 211 K/uL  Mean Cell Volume : 97.9 fl  Mean Cell Hemoglobin : 32.1 pg  Mean Cell Hemoglobin Concentration : 32.8 gm/dL  Auto Neutrophil # : 11.8 K/uL  Auto Lymphocyte # : 2.5 K/uL  Auto Monocyte # : 1.9 K/uL  Auto Eosinophil # : 0.2 K/uL  Auto Basophil # : 0.2 K/uL  Auto Neutrophil % : 71.2 %  Auto Lymphocyte % : 14.9 %  Auto Monocyte % : 11.7 %  Auto Eosinophil % : 1.0 %  Auto Basophil % : 1.1 %      ESR:                   04-10 @ 19:41  --    C-Reactive Protein:     04-10 @ 19:41  --    Procalcitonin:           04-10 @ 19:41   0.07      10    139  |  103  |  13  ----------------------------<  92  4.1   |  27  |  0.62    Ca    8.3<L>      10 Apr 2017 12:52    TPro  6.9  /  Alb  2.9<L>  /  TBili  0.4  /  DBili  x   /  AST  32  /  ALT  18  /  AlkPhos  65  -10    Procalcitonin 0.07    Rapid Respiratory Viral Panel Result         @ 21:05  Rapid RVP [facility]  Coronovirus --  Adenovirus --  Bordetella Pertussis --  Chlamydia Pneumonia --  Entero/Rhinovirus--  HKU1 Coronovirus --  HMPV Coronovirus --  Influenza A --  Influenza AH1 --  Influenza AH1  --  Influenza AH3 --  Influenza B --  Mycoplasma Pneumoniae --  NL63 Coronovirus --  OC43 Coronovirus --  Parainfluenza 1 --  Parainfluenza 2 --  Parainfluenza 3 --  Parainfluenza 4 --  Resp Syncytial Virus --      Urinalysis Basic  Urinalysis Basic - ( 2017 21:25 )    Color: Yellow / Appearance: Clear / S.005 / pH: x  Gluc: x / Ketone: Negative  / Bili: Negative / Urobili: Negative   Blood: x / Protein: Negative / Nitrite: Negative   Leuk Esterase: Negative / RBC: 0-2 /HPF / WBC 0-2   Sq Epi: x / Non Sq Epi: Few / Bacteria: Few        Vanco. trough:  Genta. trough:      Radiology: No PNA    Microbiology:Pending

## 2017-04-12 LAB
HCT VFR BLD CALC: 35.8 % — SIGNIFICANT CHANGE UP (ref 34.5–45)
HGB BLD-MCNC: 11.2 G/DL — LOW (ref 11.5–15.5)
MCHC RBC-ENTMCNC: 30.7 PG — SIGNIFICANT CHANGE UP (ref 27–34)
MCHC RBC-ENTMCNC: 31.4 GM/DL — LOW (ref 32–36)
MCV RBC AUTO: 97.7 FL — SIGNIFICANT CHANGE UP (ref 80–100)
PLATELET # BLD AUTO: 231 K/UL — SIGNIFICANT CHANGE UP (ref 150–400)
RBC # BLD: 3.67 M/UL — LOW (ref 3.8–5.2)
RBC # FLD: 12.8 % — SIGNIFICANT CHANGE UP (ref 10.3–14.5)
VANCOMYCIN TROUGH SERPL-MCNC: 6.7 UG/ML — LOW (ref 10–20)
WBC # BLD: 14.5 K/UL — HIGH (ref 3.8–10.5)
WBC # FLD AUTO: 14.5 K/UL — HIGH (ref 3.8–10.5)

## 2017-04-12 PROCEDURE — 99233 SBSQ HOSP IP/OBS HIGH 50: CPT | Mod: GC

## 2017-04-12 PROCEDURE — 71250 CT THORAX DX C-: CPT | Mod: 26

## 2017-04-12 RX ADMIN — Medication 50 MICROGRAM(S): at 05:11

## 2017-04-12 RX ADMIN — LORATADINE 10 MILLIGRAM(S): 10 TABLET ORAL at 13:17

## 2017-04-12 RX ADMIN — Medication 50 MILLIGRAM(S): at 13:17

## 2017-04-12 RX ADMIN — Medication 1 TABLET(S): at 17:57

## 2017-04-12 RX ADMIN — Medication 1 TABLET(S): at 08:44

## 2017-04-12 RX ADMIN — Medication 50 MILLIGRAM(S): at 04:00

## 2017-04-12 RX ADMIN — Medication 50 MILLIGRAM(S): at 05:11

## 2017-04-12 RX ADMIN — ENOXAPARIN SODIUM 40 MILLIGRAM(S): 100 INJECTION SUBCUTANEOUS at 17:57

## 2017-04-12 RX ADMIN — Medication 100 MILLIGRAM(S): at 08:46

## 2017-04-12 RX ADMIN — Medication 50 MILLIGRAM(S): at 21:33

## 2017-04-12 RX ADMIN — LISINOPRIL 10 MILLIGRAM(S): 2.5 TABLET ORAL at 05:11

## 2017-04-12 RX ADMIN — Medication 250 MILLIGRAM(S): at 02:00

## 2017-04-12 RX ADMIN — Medication 100 MILLIGRAM(S): at 17:58

## 2017-04-12 NOTE — PROGRESS NOTE ADULT - ATTENDING COMMENTS
Pt. seen and evaluated for fever and pneumonia.  Outpatient blood cx grew out staph epi which is likely contaminant.  Pt. now on IV Azactam and PO doxycycline.  HEENT: EOMI, anicteric sclera, MMM.  Lungs with decreased BS at bases.      Assessment and Plan:  -Fever and pneumonia:  continue with Aztreonam and Doxycycline. ID f/u  -Outpatient gram + cocci blood cx: Staph epidermis contaminant.  -HTN: stable on lisinopril and Toprol XL  -Hypothyroidism: continue synthroid.   -VTE ppx: lovenox 40mg SQ daily

## 2017-04-12 NOTE — PROGRESS NOTE ADULT - PROBLEM SELECTOR PLAN 1
possible 2/2 PNA  started on aztreonam and vancomycin, outpatient blood cx grew staph epi- likely contaminant, vanco D/Julián, continue aztreonam and started doxycycline as recommended by ID (juan m)- continue bacid  Repeat blood cx neg, urine cx negative, RVP negative  CT chest showed infiltrate R middle lobe

## 2017-04-12 NOTE — PROGRESS NOTE ADULT - SUBJECTIVE AND OBJECTIVE BOX
INTERVAL HPI/OVERNIGHT EVENTS: Patient seen and examined at bedside. Patient reports she still has cough and feels weak. Complains of fevers. Patient otherwise denies all other complaints- denies SOB, CP, abd pain, N/V/D/C.    MEDICATIONS  (STANDING):  lisinopril 10milliGRAM(s) Oral daily  loratadine 10milliGRAM(s) Oral daily  metoprolol succinate ER 50milliGRAM(s) Oral daily  levothyroxine 50MICROGram(s) Oral daily  enoxaparin Injectable 40milliGRAM(s) SubCutaneous every 24 hours  aztreonam  IVPB 1000milliGRAM(s) IV Intermittent every 8 hours  lactobacillus acidophilus 1Tablet(s) Oral two times a day with meals  doxycycline hyclate Capsule 100milliGRAM(s) Oral every 12 hours    MEDICATIONS  (PRN):  guaiFENesin    Syrup 100milliGRAM(s) Oral every 6 hours PRN Cough      Allergies    latex (Rash)  penicillin (Unknown)  sulfa drugs (Unknown)    Intolerances        CONSTITUTIONAL: + weakness, + fevers   EYES/ENT: No visual changes;  No vertigo or throat pain   NECK: No pain or stiffness  RESPIRATORY: + cough, no wheezing, hemoptysis; No shortness of breath  CARDIOVASCULAR: No chest pain or palpitations  GASTROINTESTINAL: No abdominal or epigastric pain. No nausea, vomiting, or hematemesis; No diarrhea or constipation. No melena or hematochezia.  GENITOURINARY: No dysuria, frequency or hematuria  NEUROLOGICAL: No numbness or tingling  SKIN: No itching, burning, rashes, or lesions   All other review of systems is negative unless indicated above.    Vital Signs Last 24 Hrs  T(C): 37.7, Max: 38.1 (04-12 @ 04:50)  T(F): 99.8, Max: 100.6 (04-12 @ 04:50)  HR: 70 (70 - 84)  BP: 128/67 (110/58 - 154/84)  BP(mean): --  RR: 16 (16 - 18)  SpO2: 97% (95% - 97%)  I & Os for 24h ending 04-12 @ 07:00  =============================================  IN: 660 ml / OUT: 200 ml / NET: 460 ml    I & Os for current day (as of 04-12 @ 14:45)  =============================================  IN: 50 ml / OUT: 0 ml / NET: 50 ml      General: WN/WD NAD  Neurology: A&Ox3, nonfocal, FLORES x 4  Respiratory: decreased breath sounds R base  CV: RRR, S1S2, no murmurs, rubs or gallops  Abdominal: Soft, NT, ND +BS  Extremities: No edema, + peripheral pulses      LABS:                        11.2   14.5  )-----------( 231      ( 12 Apr 2017 06:33 )             35.8     04-11    140  |  106  |  7   ----------------------------<  94  4.2   |  27  |  0.63    Ca    7.9<L>      11 Apr 2017 07:51            RADIOLOGY & ADDITIONAL TESTS:

## 2017-04-12 NOTE — PROGRESS NOTE ADULT - ASSESSMENT
85F pmhx hepatitis, hypothyroid and htn admitted for sepsis/ gram positive bacteremia , unclear source.

## 2017-04-12 NOTE — PROGRESS NOTE ADULT - SUBJECTIVE AND OBJECTIVE BOX
Patient is a 85y old  Female who presents with a chief complaint of cough and weakness (10 Apr 2017 20:36)    Asked to see patient for ID Consult  Interval History:weakness, cough    CENTRAL LINE:   [  ] YES       [x  ] NO  FRANKLIN:                 [  ] YES       [x  ] NO     PAST MEDICAL & SURGICAL HISTORY:  Autoimmune disease  Hepatitis: 20 years ago  Hypothyroid  Essential hypertension  H/O abdominal hysterectomy  History of cholecystectomy  S/P appy      REVIEW OF SYSTEMS:  All systems below were reviewed and are negative [  ]  Constitutional:weakness  HEENT:  Lymph nodes:  ID:  Pulmonary:dry cough. no CP, sob  Cardiac:  GI:No NVD  Renal:  Musculoskeletal:  Neuro:  Endocrine:  Skin:  All other systems above were reviewed and are negative   [x  ]    Allergies  Allergies    latex (Rash)  penicillin (Unknown)  sulfa drugs (Unknown)    Intolerances        MEDICATIONS  (STANDING):  lisinopril 10milliGRAM(s) Oral daily  loratadine 10milliGRAM(s) Oral daily  metoprolol succinate ER 50milliGRAM(s) Oral daily  levothyroxine 50MICROGram(s) Oral daily  enoxaparin Injectable 40milliGRAM(s) SubCutaneous every 24 hours  aztreonam  IVPB 1000milliGRAM(s) IV Intermittent every 8 hours  lactobacillus acidophilus 1Tablet(s) Oral two times a day with meals  doxycycline hyclate Capsule 100milliGRAM(s) Oral every 12 hours    MEDICATIONS  (PRN):  guaiFENesin    Syrup 100milliGRAM(s) Oral every 6 hours PRN Cough      Vital Signs Last 24 Hrs  T(C): 38.1, Max: 38.1 (04-12 @ 04:50)  T(F): 100.6, Max: 100.6 (04-12 @ 04:50)  HR: 77 (64 - 84)  BP: 154/84 (110/58 - 154/84)  BP(mean): --  RR: 16 (16 - 18)  SpO2: 95% (95% - 99%)    I&O's Summary    I & Os for current day (as of 12 Apr 2017 07:40)  =============================================  IN: 660 ml / OUT: 200 ml / NET: 460 ml      PHYSICAL EXAM:  Constitutional:weak  HEENT:  Lymph nodes:  Neck:  Lungs:b/l rhonchi  Heart:  Abdomen:soft  Extremities:  Neurologic:  Vascular:  Neuro:  Endocrine:  Skin:  All of the above normal except for written comments [x  ]    LABORATORY:    CBC Full  -  ( 12 Apr 2017 06:33 )  WBC Count : 14.5 K/uL  Hemoglobin : 11.2 g/dL  Hematocrit : 35.8 %  Platelet Count - Automated : 231 K/uL  Mean Cell Volume : 97.7 fl  Mean Cell Hemoglobin : 30.7 pg  Mean Cell Hemoglobin Concentration : 31.4 gm/dL  Auto Neutrophil # : x  Auto Lymphocyte # : x  Auto Monocyte # : x  Auto Eosinophil # : x  Auto Basophil # : x  Auto Neutrophil % : x  Auto Lymphocyte % : x  Auto Monocyte % : x  Auto Eosinophil % : x  Auto Basophil % : x      ESR:                   04-10 @ 19:41  --    C-Reactive Protein:     04-10 @ 19:41  --    Procalcitonin:           04-10 @ 19:41   0.07      04-11    140  |  106  |  7   ----------------------------<  94  4.2   |  27  |  0.63    Ca    7.9<L>      11 Apr 2017 07:51    TPro  6.9  /  Alb  2.9<L>  /  TBili  0.4  /  DBili  x   /  AST  32  /  ALT  18  /  AlkPhos  65  04-10      Rapid Respiratory Viral Panel Result        04-09 @ 21:05  Rapid RVP [facility]  Coronovirus --  Adenovirus --  Bordetella Pertussis --  Chlamydia Pneumonia --  Entero/Rhinovirus--  HKU1 Coronovirus --  HMPV Coronovirus --  Influenza A --  Influenza AH1 --  Influenza AH1 2009 --  Influenza AH3 --  Influenza B --  Mycoplasma Pneumoniae --  NL63 Coronovirus --  OC43 Coronovirus --  Parainfluenza 1 --  Parainfluenza 2 --  Parainfluenza 3 --  Parainfluenza 4 --  Resp Syncytial Virus --          Vanco. trough:6.7  Genta. trough:      Radiology:    Microbiology:bl cx drawn 4/7-CNS. rept bl cx and ur cx NG

## 2017-04-12 NOTE — PROGRESS NOTE ADULT - ASSESSMENT
cont elevated temp, cough, WBC. Will check CT chest-r/oPNA. DC vanco.(3). cont azactam(3). Add doxy(1)

## 2017-04-13 ENCOUNTER — TRANSCRIPTION ENCOUNTER (OUTPATIENT)
Age: 82
End: 2017-04-13

## 2017-04-13 VITALS
TEMPERATURE: 99 F | OXYGEN SATURATION: 98 % | DIASTOLIC BLOOD PRESSURE: 69 MMHG | SYSTOLIC BLOOD PRESSURE: 119 MMHG | HEART RATE: 61 BPM | RESPIRATION RATE: 17 BRPM

## 2017-04-13 LAB
ANION GAP SERPL CALC-SCNC: 8 MMOL/L — SIGNIFICANT CHANGE UP (ref 5–17)
BUN SERPL-MCNC: 9 MG/DL — SIGNIFICANT CHANGE UP (ref 7–23)
CALCIUM SERPL-MCNC: 8.1 MG/DL — LOW (ref 8.5–10.1)
CHLORIDE SERPL-SCNC: 105 MMOL/L — SIGNIFICANT CHANGE UP (ref 96–108)
CO2 SERPL-SCNC: 27 MMOL/L — SIGNIFICANT CHANGE UP (ref 22–31)
CREAT SERPL-MCNC: 0.75 MG/DL — SIGNIFICANT CHANGE UP (ref 0.5–1.3)
GLUCOSE SERPL-MCNC: 85 MG/DL — SIGNIFICANT CHANGE UP (ref 70–99)
HCT VFR BLD CALC: 37 % — SIGNIFICANT CHANGE UP (ref 34.5–45)
HGB BLD-MCNC: 12.3 G/DL — SIGNIFICANT CHANGE UP (ref 11.5–15.5)
MCHC RBC-ENTMCNC: 32 PG — SIGNIFICANT CHANGE UP (ref 27–34)
MCHC RBC-ENTMCNC: 33.2 GM/DL — SIGNIFICANT CHANGE UP (ref 32–36)
MCV RBC AUTO: 96.5 FL — SIGNIFICANT CHANGE UP (ref 80–100)
PLATELET # BLD AUTO: 258 K/UL — SIGNIFICANT CHANGE UP (ref 150–400)
POTASSIUM SERPL-MCNC: 4.3 MMOL/L — SIGNIFICANT CHANGE UP (ref 3.5–5.3)
POTASSIUM SERPL-SCNC: 4.3 MMOL/L — SIGNIFICANT CHANGE UP (ref 3.5–5.3)
RBC # BLD: 3.84 M/UL — SIGNIFICANT CHANGE UP (ref 3.8–5.2)
RBC # FLD: 12.6 % — SIGNIFICANT CHANGE UP (ref 10.3–14.5)
SODIUM SERPL-SCNC: 140 MMOL/L — SIGNIFICANT CHANGE UP (ref 135–145)
WBC # BLD: 13.8 K/UL — HIGH (ref 3.8–10.5)
WBC # FLD AUTO: 13.8 K/UL — HIGH (ref 3.8–10.5)

## 2017-04-13 PROCEDURE — 80053 COMPREHEN METABOLIC PANEL: CPT

## 2017-04-13 PROCEDURE — 96365 THER/PROPH/DIAG IV INF INIT: CPT

## 2017-04-13 PROCEDURE — 93005 ELECTROCARDIOGRAM TRACING: CPT

## 2017-04-13 PROCEDURE — 96375 TX/PRO/DX INJ NEW DRUG ADDON: CPT

## 2017-04-13 PROCEDURE — 80048 BASIC METABOLIC PNL TOTAL CA: CPT

## 2017-04-13 PROCEDURE — 80202 ASSAY OF VANCOMYCIN: CPT

## 2017-04-13 PROCEDURE — 99285 EMERGENCY DEPT VISIT HI MDM: CPT | Mod: 25

## 2017-04-13 PROCEDURE — 99239 HOSP IP/OBS DSCHRG MGMT >30: CPT

## 2017-04-13 PROCEDURE — 96376 TX/PRO/DX INJ SAME DRUG ADON: CPT

## 2017-04-13 PROCEDURE — 87040 BLOOD CULTURE FOR BACTERIA: CPT

## 2017-04-13 PROCEDURE — 85027 COMPLETE CBC AUTOMATED: CPT

## 2017-04-13 PROCEDURE — 85610 PROTHROMBIN TIME: CPT

## 2017-04-13 PROCEDURE — 94640 AIRWAY INHALATION TREATMENT: CPT

## 2017-04-13 PROCEDURE — 83605 ASSAY OF LACTIC ACID: CPT

## 2017-04-13 PROCEDURE — 96372 THER/PROPH/DIAG INJ SC/IM: CPT | Mod: 59

## 2017-04-13 PROCEDURE — 84145 PROCALCITONIN (PCT): CPT

## 2017-04-13 PROCEDURE — 71250 CT THORAX DX C-: CPT

## 2017-04-13 RX ORDER — AZITHROMYCIN 500 MG/1
0 TABLET, FILM COATED ORAL
Qty: 0 | Refills: 0 | COMMUNITY

## 2017-04-13 RX ORDER — LACTOBACILLUS ACIDOPHILUS 100MM CELL
1 CAPSULE ORAL
Qty: 14 | Refills: 0
Start: 2017-04-13 | End: 2017-04-20

## 2017-04-13 RX ADMIN — Medication 50 MILLIGRAM(S): at 06:57

## 2017-04-13 RX ADMIN — Medication 100 MILLIGRAM(S): at 06:57

## 2017-04-13 RX ADMIN — Medication 50 MILLIGRAM(S): at 04:28

## 2017-04-13 RX ADMIN — Medication 50 MICROGRAM(S): at 06:57

## 2017-04-13 RX ADMIN — LISINOPRIL 10 MILLIGRAM(S): 2.5 TABLET ORAL at 06:57

## 2017-04-13 RX ADMIN — Medication 1 TABLET(S): at 08:18

## 2017-04-13 NOTE — DISCHARGE NOTE ADULT - MEDICATION SUMMARY - MEDICATIONS TO TAKE
I will START or STAY ON the medications listed below when I get home from the hospital:    lisinopril 10 mg oral tablet  -- 1 tab(s) by mouth once a day  -- Indication: For Essential hypertension    loratadine 10 mg oral capsule  -- 1 cap(s) by mouth once a day  -- Indication: For Antihistamine    doxycycline monohydrate 100 mg oral capsule  -- 1 cap(s) by mouth every 12 hours  -- Indication: For Pna    metoprolol succinate 50 mg oral tablet, extended release  -- 1 tab(s) by mouth once a day  -- Indication: For Htn    guaiFENesin 100 mg/5 mL oral liquid  -- 5 milliliter(s) by mouth every 6 hours, As needed, Cough  -- Indication: For cough    lactobacillus acidophilus oral capsule  -- 1 cap(s) by mouth 2 times a day  -- Indication: For Ppx    Synthroid 50 mcg (0.05 mg) oral tablet  -- 1 tab(s) by mouth once a day  -- Indication: For Hypothyroid    homatropine-HYDROcodone 1.5 mg-5 mg/5 mL oral syrup  -- 5 milliliter(s) by mouth every 8 hours, As Needed -for cough MDD:15ml  -- Caution federal law prohibits the transfer of this drug to any person other  than the person for whom it was prescribed.  May cause drowsiness.  Alcohol may intensify this effect.  Use care when operating dangerous machinery.  Obtain medical advice before taking any non-prescription drugs as some may affect the action of this medication.    -- Indication: For cough I will START or STAY ON the medications listed below when I get home from the hospital:    lisinopril 10 mg oral tablet  -- 1 tab(s) by mouth once a day  -- Indication: For Essential hypertension    loratadine 10 mg oral capsule  -- 1 cap(s) by mouth once a day  -- Indication: For Allergies    doxycycline monohydrate 100 mg oral capsule  -- 1 cap(s) by mouth every 12 hours  -- Indication: For PNA (pneumonia)    metoprolol succinate 50 mg oral tablet, extended release  -- 1 tab(s) by mouth once a day  -- Indication: For Essential hypertension    guaiFENesin 100 mg/5 mL oral liquid  -- 5 milliliter(s) by mouth every 6 hours, As needed, Cough  -- Indication: For PNA (pneumonia)    lactobacillus acidophilus oral capsule  -- 1 cap(s) by mouth 2 times a day  -- Indication: For Probiotic    Synthroid 50 mcg (0.05 mg) oral tablet  -- 1 tab(s) by mouth once a day  -- Indication: For Hypothyroid    homatropine-HYDROcodone 1.5 mg-5 mg/5 mL oral syrup  -- 5 milliliter(s) by mouth every 8 hours, As Needed -for cough MDD:15ml  -- Caution federal law prohibits the transfer of this drug to any person other  than the person for whom it was prescribed.  May cause drowsiness.  Alcohol may intensify this effect.  Use care when operating dangerous machinery.  Obtain medical advice before taking any non-prescription drugs as some may affect the action of this medication.    -- Indication: For cough

## 2017-04-13 NOTE — DISCHARGE NOTE ADULT - PATIENT PORTAL LINK FT
“You can access the FollowHealth Patient Portal, offered by [facility], by registering with the following website: [email or web address]”

## 2017-04-13 NOTE — DISCHARGE NOTE ADULT - CARE PLAN
Principal Discharge DX:	Sepsis, due to unspecified organism  Goal:	resolution  Instructions for follow-up, activity and diet:	Resolved, continue antibiotics- doxycycline 100mg BID for total 7 days  Follow up with Dr. Godinez within 1 week of discharge for evaluation  + blood culture likely contaminant- follow up with Dr. Godinez as outpatient  Secondary Diagnosis:	PNA (pneumonia)  Instructions for follow-up, activity and diet:	Continue antibiotics- doxycycline 100mg BID for total 7 days  Continue cough medicine prn  Secondary Diagnosis:	Essential hypertension  Instructions for follow-up, activity and diet:	Continue home medications  Secondary Diagnosis:	Hypothyroid  Instructions for follow-up, activity and diet:	Continue home medications

## 2017-04-13 NOTE — DISCHARGE NOTE ADULT - CARE PROVIDER_API CALL
Tiburcio Godinez), Infectious Disease; Internal Medicine  [address] [place] [id number]  Phone: [phone]  Fax: [phone]

## 2017-04-13 NOTE — DISCHARGE NOTE ADULT - MEDICATION SUMMARY - MEDICATIONS TO STOP TAKING
I will STOP taking the medications listed below when I get home from the hospital:    Azithromycin 5 Day Dose Pack 250 mg oral tablet  --  by mouth

## 2017-04-13 NOTE — DISCHARGE NOTE ADULT - HOSPITAL COURSE
85F pmhx hepatitis, hypothyroid and htn c/o of weakness and cough.  Weakness started one week ago, states for the past year has been having symptoms of weakness with increase falls (most recent fall two days ago trying to get out of bed).  States daughter has given her natural energy boost medicines and states it has helped with energy.  States full body fatigue and tenderness progressively worsening with weakness.  No other associated symptoms. Cough also started about a week ago, that progressively worsened, was given antibiotics by dr godinez and is on day 3/5 as of today.  States associated throat tenderness in the "glands" with some groin discomfort.  Admits fevers but denies nausea, vomitting or diarrhea.    allergies to penicillin --> rash.    As per ER attending who spoke with PMD, dr godinez, was prelim blood culture is gram positive cocci.  Dr godinez recommended vanco and zosyn, due to pen allergy, started on vanco and aztreonam. Patient admitted to medicine.    Patient had repeat blood culture, which showed NGTD. Outpatient blood culture which was positive grew staph epi in anaerobic bottle- as per ID, Dr. Godinez, likely contaminant. Patient had CT Chest which showed small tree-in-bud parenchymal infiltrate right middle lobe likely inflammatory/infectious bronchiolitis, indeterminate parenchymal nodularity as described, emphysema with chronic parenchymal changes at least some of which likely related to remote granulomatous exposure, small bilateral pleural effusions. RVP negative. Patient was taken off aztreonam, to continue doxycycline for total 7 days and follow up with Dr. godinez as outpatient. Patient currently medically stable for discharge.    4/13/17  Vitals: T 98.2, HR 66, /70, SpO2 98% on RA    PHYSICAL EXAM:  General: Well developed, well nourished, NAD  HEENT: NCAT, PERRLA, EOMI, moist mucous membranes   Neck: Supple, nontender, no mass  Neurology: A&Ox3, nonfocal, CN II-XII grossly intact, sensation intact, no gait abnormalities   Respiratory: CTA B/L, No W/R/R  CV: RRR, +S1/S2, no murmurs, rubs or gallops  Abdominal: Soft, NT, ND +BSx4  Extremities: No C/C/E, + peripheral pulses  MSK: Normal ROM, no joint erythema or warmth, no joint swelling   Skin: warm, dry 85F pmhx hepatitis, hypothyroid and htn c/o of weakness and cough.  Weakness started one week ago, states for the past year has been having symptoms of weakness with increase falls (most recent fall two days ago trying to get out of bed).  States daughter has given her natural energy boost medicines and states it has helped with energy.  States full body fatigue and tenderness progressively worsening with weakness.  No other associated symptoms. Cough also started about a week ago, that progressively worsened, was given antibiotics by dr godinez and is on day 3/5 as of today.  States associated throat tenderness in the "glands" with some groin discomfort.  Admits fevers but denies nausea, vomitting or diarrhea.    allergies to penicillin --> rash.    As per ER attending who spoke with PMD, dr godinez, was prelim blood culture is gram positive cocci.  Dr godinez recommended vanco and zosyn, due to pen allergy, started on vanco and aztreonam. Patient admitted to medicine.    Patient had repeat blood culture, which showed NGTD. Outpatient blood culture which was positive grew staph epi in anaerobic bottle- as per ID, Dr. Godinez, likely contaminant. Patient had CT Chest which showed small tree-in-bud parenchymal infiltrate right middle lobe likely inflammatory/infectious bronchiolitis, indeterminate parenchymal nodularity as described, emphysema with chronic parenchymal changes at least some of which likely related to remote granulomatous exposure, small bilateral pleural effusions. RVP negative. Patient was taken off aztreonam, to continue doxycycline for total 7 days and follow up with Dr. godinez as outpatient. Patient currently medically stable for discharge.    4/13/17  Vitals: T 98.2, HR 66, /70, SpO2 98% on RA    PHYSICAL EXAM:  General: Well developed, well nourished, NAD  HEENT: NCAT, PERRLA, EOMI, moist mucous membranes   Neck: Supple, nontender, no mass  Neurology: A&Ox3, nonfocal, CN II-XII grossly intact, sensation intact, no gait abnormalities   Respiratory: CTA B/L, No W/R/R  CV: RRR, +S1/S2, no murmurs, rubs or gallops  Abdominal: Soft, NT, ND +BSx4  Extremities: No C/C/E, + peripheral pulses  MSK: Normal ROM, no joint erythema or warmth, no joint swelling   Skin: warm, dry    Medciations reviewed.  Completion of discharge in 35 minutes.

## 2017-04-13 NOTE — DISCHARGE NOTE ADULT - PLAN OF CARE
resolution Resolved, continue antibiotics- doxycycline 100mg BID for total 7 days  Follow up with Dr. Godinez within 1 week of discharge for evaluation  + blood culture likely contaminant- follow up with Dr. Godinez as outpatient Continue antibiotics- doxycycline 100mg BID for total 7 days  Continue cough medicine prn Continue home medications

## 2017-04-13 NOTE — PROGRESS NOTE ADULT - SUBJECTIVE AND OBJECTIVE BOX
Patient is a 85y old  Female who presents with a chief complaint of cough and weakness (10 Apr 2017 20:36)    Asked to see patient for ID Consult  Interval History:fever, cough. CT-RML PNA (CAP). feeling better    CENTRAL LINE:   [  ] YES       [x  ] NO  FRANK[place]:                 [  ] YES       [x  ] NO     PAST MEDICAL & SURGICAL HISTORY:  Autoimmune disease  Hepatitis: 20 years ago  Hypothyroid  Essential hypertension  H/O abdominal hysterectomy  History of cholecystectomy  S/P appy      REVIEW OF SYSTEMS:  All systems below were reviewed and are negative [  ]  Constitutional:inc. strength  HEENT:  Lymph nodes:  ID:  Pulmonary:dec. cough  Cardiac:  GI:no NVD  Renal:  Musculoskeletal:pain b/l groins resolved   Neuro:  Endocrine:  Skin:  All other systems above were reviewed and are negative   [ x ]    Allergies  Allergies    latex (Rash)  penicillin (Unknown)  sulfa drugs (Unknown)    Intolerances        MEDICATIONS  (STANDING):  lisinopril 10milliGRAM(s) Oral daily  loratadine 10milliGRAM(s) Oral daily  metoprolol succinate ER 50milliGRAM(s) Oral daily  levothyroxine 50MICROGram(s) Oral daily  enoxaparin Injectable 40milliGRAM(s) SubCutaneous every 24 hours  aztreonam  IVPB 1000milliGRAM(s) IV Intermittent every 8 hours  lactobacillus acidophilus 1Tablet(s) Oral two times a day with meals  doxycycline hyclate Capsule 100milliGRAM(s) Oral every 12 hours    MEDICATIONS  (PRN):  guaiFENesin    Syrup 100milliGRAM(s) Oral every 6 hours PRN Cough      Vital Signs Last 24 Hrs  T(C): 36.8, Max: 37.7 (04-12 @ 13:06)  T(F): 98.2, Max: 99.8 (04-12 @ 13:06)  HR: 66 (66 - 84)  BP: 149/70 (103/50 - 149/70)  BP(mean): --  RR: 16 (16 - 16)  SpO2: 98% (95% - 98%)    I&O's Summary    I & Os for current day (as of 13 Apr 2017 07:57)  =============================================  IN: 150 ml / OUT: 0 ml / NET: 150 ml      PHYSICAL EXAM:  Constitutional:feeling better  HEENT:  Lymph nodes:  Neck:  Lungs:coarse BS. No rales  Heart:  Abdomen:soft. No groin pain  Extremities:  Neurologic:  Vascular:  Neuro:  Endocrine:  Skin:  All of the above normal except for written comments [x  ]    LABORATORY:    CBC Full  -  ( 12 Apr 2017 06:33 )  WBC Count : 14.5 K/uL  Hemoglobin : 11.2 g/dL  Hematocrit : 35.8 %  Platelet Count - Automated : 231 K/uL  Mean Cell Volume : 97.7 fl  Mean Cell Hemoglobin : 30.7 pg  Mean Cell Hemoglobin Concentration : 31.4 gm/dL  Auto Neutrophil # : x  Auto Lymphocyte # : x  Auto Monocyte # : x  Auto Eosinophil # : x  Auto Basophil # : x  Auto Neutrophil % : x  Auto Lymphocyte % : x  Auto Monocyte % : x  Auto Eosinophil % : x  Auto Basophil % : x      ESR:                   04-10 @ 19:41  --    C-Reactive Protein:     04-10 @ 19:41  --    Procalcitonin:           04-10 @ 19:41   0.07              Rapid Respiratory Viral Panel Result        04-09 @ 21:05  Rapid RVP NotDetec  Coronovirus --  Adenovirus --  Bordetella Pertussis --  Chlamydia Pneumonia --  Entero/Rhinovirus--  HKU1 Coronovirus --  HMPV Coronovirus --  Influenza A --  Influenza AH1 --  Influenza AH1 2009 --  Influenza AH3 --  Influenza B --  Mycoplasma Pneumoniae --  NL63 Coronovirus --  OC43 Coronovirus --  Parainfluenza 1 --  Parainfluenza 2 --  Parainfluenza 3 --  Parainfluenza 4 --  Resp Syncytial Virus --          Vanco. trough:6.7  Genta. trough:      Radiology:EXAM:  CT CHEST                            PROCEDURE DATE:  04/12/2017        INTERPRETATION:  History: Cough, fever.    Noncontrast chest CT.    Mild to moderate centrilobular emphysematous changes are present.   Scattered areas of bilateral postinflammatory cicatrization. Occasional   calcified granulomas right lung. There is an indeterminate 5 mm   perifissural nodule right midlung (2, 73). There is a 4 mm indeterminate   noncalcified right lower lobe parenchymal nodule (2, 70). There is a 3cm   semisolid partially groundglass parenchymal opacity right upper lobe, and   a spiculated noncalcified right lower lobe solid parenchymal nodule.   These may be inflammatory/infectious versus neoplastic in nature.   Recommend short-term CT follow-up. There is a small infiltrate with   tree-in-bud parenchymal pattern in the lateral segment of the right   middle lobe likely an inflammatory or infectious bronchiolitis.   Small layering nearly symmetric bilateral pleural effusions.  Scattered nonspecific subcentimeter and borderline enlarged mediastinal   lymph nodes likely reactive. Hilar evaluation limited without IV contrast.  Normal heart size. Cardiac valvular and coronary artery calcination   noted. No pericardial abnormality.  Upper abdomen is visualized grossly unremarkable.  No acute skeletal abnormality    Impression:    Small tree-in-bud parenchymal infiltrate right middle lobe likely   inflammatory/infectious bronchiolitis. Indeterminate parenchymal   nodularity as described. Recommend short-term CT surveillance.  Emphysema with chronic parenchymal changes at least some of which likely   related to remote granulomatous exposure.  Small bilateral pleural effusions.    GUY OROZCO M.D., ATTENDING RADIOLOGIST  This document has been electronically signed. Apr 12 2017  9:02AM                Microbiology:preadm bl cx-CNS. All bl cx since adm NG.[id number].cx-NG

## 2017-04-13 NOTE — PROGRESS NOTE ADULT - ASSESSMENT
DC azactam. cont doxy (2) x total 7 days. consider Discharge today. I will see in OF next week. Pt has hx of pulm nodules for years

## 2017-04-15 LAB
CULTURE RESULTS: SIGNIFICANT CHANGE UP
SPECIMEN SOURCE: SIGNIFICANT CHANGE UP

## 2017-04-17 DIAGNOSIS — I10 ESSENTIAL (PRIMARY) HYPERTENSION: ICD-10-CM

## 2017-04-17 DIAGNOSIS — Z87.891 PERSONAL HISTORY OF NICOTINE DEPENDENCE: ICD-10-CM

## 2017-04-17 DIAGNOSIS — Z88.0 ALLERGY STATUS TO PENICILLIN: ICD-10-CM

## 2017-04-17 DIAGNOSIS — J18.9 PNEUMONIA, UNSPECIFIED ORGANISM: ICD-10-CM

## 2017-04-17 DIAGNOSIS — Z88.2 ALLERGY STATUS TO SULFONAMIDES: ICD-10-CM

## 2017-04-17 DIAGNOSIS — A41.9 SEPSIS, UNSPECIFIED ORGANISM: ICD-10-CM

## 2017-04-17 DIAGNOSIS — E03.9 HYPOTHYROIDISM, UNSPECIFIED: ICD-10-CM

## 2019-11-12 NOTE — ED ADULT TRIAGE NOTE - MEANS OF ARRIVAL
ambulatory A-T Advancement Flap Text: The defect edges were debeveled with a #15 scalpel blade.  Given the location of the defect, shape of the defect and the proximity to free margins an A-T advancement flap was deemed most appropriate.  Using a sterile surgical marker, an appropriate advancement flap was drawn incorporating the defect and placing the expected incisions within the relaxed skin tension lines where possible.    The area thus outlined was incised deep to adipose tissue with a #15 scalpel blade.  The skin margins were undermined to an appropriate distance in all directions utilizing iris scissors.

## 2020-07-13 ENCOUNTER — EMERGENCY (EMERGENCY)
Facility: HOSPITAL | Age: 85
LOS: 1 days | Discharge: HOME | End: 2020-07-13
Attending: EMERGENCY MEDICINE | Admitting: EMERGENCY MEDICINE
Payer: COMMERCIAL

## 2020-07-13 VITALS
DIASTOLIC BLOOD PRESSURE: 76 MMHG | SYSTOLIC BLOOD PRESSURE: 158 MMHG | RESPIRATION RATE: 16 BRPM | HEART RATE: 67 BPM | OXYGEN SATURATION: 99 % | TEMPERATURE: 98 F

## 2020-07-13 VITALS
RESPIRATION RATE: 17 BRPM | SYSTOLIC BLOOD PRESSURE: 174 MMHG | DIASTOLIC BLOOD PRESSURE: 73 MMHG | HEART RATE: 62 BPM | WEIGHT: 119.93 LBS | OXYGEN SATURATION: 96 % | TEMPERATURE: 98 F | HEIGHT: 65 IN

## 2020-07-13 DIAGNOSIS — Z90.710 ACQUIRED ABSENCE OF BOTH CERVIX AND UTERUS: Chronic | ICD-10-CM

## 2020-07-13 DIAGNOSIS — Z98.89 OTHER SPECIFIED POSTPROCEDURAL STATES: Chronic | ICD-10-CM

## 2020-07-13 DIAGNOSIS — Z90.49 ACQUIRED ABSENCE OF OTHER SPECIFIED PARTS OF DIGESTIVE TRACT: Chronic | ICD-10-CM

## 2020-07-13 LAB
ALBUMIN SERPL ELPH-MCNC: 3.1 G/DL — LOW (ref 3.3–5)
ALP SERPL-CCNC: 72 U/L — SIGNIFICANT CHANGE UP (ref 40–120)
ALT FLD-CCNC: 21 U/L — SIGNIFICANT CHANGE UP (ref 12–78)
ANION GAP SERPL CALC-SCNC: 2 MMOL/L — LOW (ref 5–17)
APPEARANCE UR: CLEAR — SIGNIFICANT CHANGE UP
AST SERPL-CCNC: 26 U/L — SIGNIFICANT CHANGE UP (ref 15–37)
BASOPHILS # BLD AUTO: 0.04 K/UL — SIGNIFICANT CHANGE UP (ref 0–0.2)
BASOPHILS NFR BLD AUTO: 0.5 % — SIGNIFICANT CHANGE UP (ref 0–2)
BILIRUB SERPL-MCNC: 0.3 MG/DL — SIGNIFICANT CHANGE UP (ref 0.2–1.2)
BILIRUB UR-MCNC: NEGATIVE — SIGNIFICANT CHANGE UP
BUN SERPL-MCNC: 16 MG/DL — SIGNIFICANT CHANGE UP (ref 7–23)
CALCIUM SERPL-MCNC: 8.5 MG/DL — SIGNIFICANT CHANGE UP (ref 8.5–10.1)
CHLORIDE SERPL-SCNC: 107 MMOL/L — SIGNIFICANT CHANGE UP (ref 96–108)
CO2 SERPL-SCNC: 31 MMOL/L — SIGNIFICANT CHANGE UP (ref 22–31)
COLOR SPEC: YELLOW — SIGNIFICANT CHANGE UP
CREAT SERPL-MCNC: 0.79 MG/DL — SIGNIFICANT CHANGE UP (ref 0.5–1.3)
DIFF PNL FLD: ABNORMAL
EOSINOPHIL # BLD AUTO: 0.14 K/UL — SIGNIFICANT CHANGE UP (ref 0–0.5)
EOSINOPHIL NFR BLD AUTO: 1.6 % — SIGNIFICANT CHANGE UP (ref 0–6)
GLUCOSE SERPL-MCNC: 89 MG/DL — SIGNIFICANT CHANGE UP (ref 70–99)
GLUCOSE UR QL: NEGATIVE — SIGNIFICANT CHANGE UP
HCT VFR BLD CALC: 39.6 % — SIGNIFICANT CHANGE UP (ref 34.5–45)
HGB BLD-MCNC: 13.1 G/DL — SIGNIFICANT CHANGE UP (ref 11.5–15.5)
IMM GRANULOCYTES NFR BLD AUTO: 0.2 % — SIGNIFICANT CHANGE UP (ref 0–1.5)
KETONES UR-MCNC: NEGATIVE — SIGNIFICANT CHANGE UP
LACTATE SERPL-SCNC: 0.8 MMOL/L — SIGNIFICANT CHANGE UP (ref 0.7–2)
LEUKOCYTE ESTERASE UR-ACNC: NEGATIVE — SIGNIFICANT CHANGE UP
LIDOCAIN IGE QN: 21 U/L — LOW (ref 73–393)
LYMPHOCYTES # BLD AUTO: 3.15 K/UL — SIGNIFICANT CHANGE UP (ref 1–3.3)
LYMPHOCYTES # BLD AUTO: 36.3 % — SIGNIFICANT CHANGE UP (ref 13–44)
MCHC RBC-ENTMCNC: 30.9 PG — SIGNIFICANT CHANGE UP (ref 27–34)
MCHC RBC-ENTMCNC: 33.1 GM/DL — SIGNIFICANT CHANGE UP (ref 32–36)
MCV RBC AUTO: 93.4 FL — SIGNIFICANT CHANGE UP (ref 80–100)
MONOCYTES # BLD AUTO: 0.99 K/UL — HIGH (ref 0–0.9)
MONOCYTES NFR BLD AUTO: 11.4 % — SIGNIFICANT CHANGE UP (ref 2–14)
NEUTROPHILS # BLD AUTO: 4.34 K/UL — SIGNIFICANT CHANGE UP (ref 1.8–7.4)
NEUTROPHILS NFR BLD AUTO: 50 % — SIGNIFICANT CHANGE UP (ref 43–77)
NITRITE UR-MCNC: NEGATIVE — SIGNIFICANT CHANGE UP
NRBC # BLD: 0 /100 WBCS — SIGNIFICANT CHANGE UP (ref 0–0)
PH UR: 6 — SIGNIFICANT CHANGE UP (ref 5–8)
PLATELET # BLD AUTO: 154 K/UL — SIGNIFICANT CHANGE UP (ref 150–400)
POTASSIUM SERPL-MCNC: 3.9 MMOL/L — SIGNIFICANT CHANGE UP (ref 3.5–5.3)
POTASSIUM SERPL-SCNC: 3.9 MMOL/L — SIGNIFICANT CHANGE UP (ref 3.5–5.3)
PROT SERPL-MCNC: 7.6 G/DL — SIGNIFICANT CHANGE UP (ref 6–8.3)
PROT UR-MCNC: NEGATIVE — SIGNIFICANT CHANGE UP
RBC # BLD: 4.24 M/UL — SIGNIFICANT CHANGE UP (ref 3.8–5.2)
RBC # FLD: 13.5 % — SIGNIFICANT CHANGE UP (ref 10.3–14.5)
SARS-COV-2 RNA SPEC QL NAA+PROBE: SIGNIFICANT CHANGE UP
SODIUM SERPL-SCNC: 140 MMOL/L — SIGNIFICANT CHANGE UP (ref 135–145)
SP GR SPEC: 1.01 — SIGNIFICANT CHANGE UP (ref 1.01–1.02)
UROBILINOGEN FLD QL: NEGATIVE — SIGNIFICANT CHANGE UP
WBC # BLD: 8.68 K/UL — SIGNIFICANT CHANGE UP (ref 3.8–10.5)
WBC # FLD AUTO: 8.68 K/UL — SIGNIFICANT CHANGE UP (ref 3.8–10.5)

## 2020-07-13 PROCEDURE — 74176 CT ABD & PELVIS W/O CONTRAST: CPT | Mod: 26

## 2020-07-13 PROCEDURE — 71045 X-RAY EXAM CHEST 1 VIEW: CPT | Mod: 26

## 2020-07-13 PROCEDURE — 87040 BLOOD CULTURE FOR BACTERIA: CPT

## 2020-07-13 PROCEDURE — 96375 TX/PRO/DX INJ NEW DRUG ADDON: CPT

## 2020-07-13 PROCEDURE — 80053 COMPREHEN METABOLIC PANEL: CPT

## 2020-07-13 PROCEDURE — 96361 HYDRATE IV INFUSION ADD-ON: CPT

## 2020-07-13 PROCEDURE — 87086 URINE CULTURE/COLONY COUNT: CPT

## 2020-07-13 PROCEDURE — 83605 ASSAY OF LACTIC ACID: CPT

## 2020-07-13 PROCEDURE — 74176 CT ABD & PELVIS W/O CONTRAST: CPT

## 2020-07-13 PROCEDURE — 99284 EMERGENCY DEPT VISIT MOD MDM: CPT | Mod: 25

## 2020-07-13 PROCEDURE — 71045 X-RAY EXAM CHEST 1 VIEW: CPT

## 2020-07-13 PROCEDURE — 87635 SARS-COV-2 COVID-19 AMP PRB: CPT

## 2020-07-13 PROCEDURE — 99284 EMERGENCY DEPT VISIT MOD MDM: CPT

## 2020-07-13 PROCEDURE — 93010 ELECTROCARDIOGRAM REPORT: CPT

## 2020-07-13 PROCEDURE — 85027 COMPLETE CBC AUTOMATED: CPT

## 2020-07-13 PROCEDURE — 93005 ELECTROCARDIOGRAM TRACING: CPT

## 2020-07-13 PROCEDURE — 81001 URINALYSIS AUTO W/SCOPE: CPT

## 2020-07-13 PROCEDURE — 36415 COLL VENOUS BLD VENIPUNCTURE: CPT

## 2020-07-13 PROCEDURE — 83690 ASSAY OF LIPASE: CPT

## 2020-07-13 PROCEDURE — 96374 THER/PROPH/DIAG INJ IV PUSH: CPT

## 2020-07-13 RX ORDER — PANTOPRAZOLE SODIUM 20 MG/1
40 TABLET, DELAYED RELEASE ORAL ONCE
Refills: 0 | Status: COMPLETED | OUTPATIENT
Start: 2020-07-13 | End: 2020-07-13

## 2020-07-13 RX ORDER — ONDANSETRON 8 MG/1
4 TABLET, FILM COATED ORAL ONCE
Refills: 0 | Status: COMPLETED | OUTPATIENT
Start: 2020-07-13 | End: 2020-07-13

## 2020-07-13 RX ORDER — SODIUM CHLORIDE 9 MG/ML
1000 INJECTION INTRAMUSCULAR; INTRAVENOUS; SUBCUTANEOUS ONCE
Refills: 0 | Status: COMPLETED | OUTPATIENT
Start: 2020-07-13 | End: 2020-07-13

## 2020-07-13 RX ADMIN — SODIUM CHLORIDE 1000 MILLILITER(S): 9 INJECTION INTRAMUSCULAR; INTRAVENOUS; SUBCUTANEOUS at 16:40

## 2020-07-13 RX ADMIN — ONDANSETRON 4 MILLIGRAM(S): 8 TABLET, FILM COATED ORAL at 16:41

## 2020-07-13 RX ADMIN — PANTOPRAZOLE SODIUM 40 MILLIGRAM(S): 20 TABLET, DELAYED RELEASE ORAL at 16:41

## 2020-07-13 RX ADMIN — SODIUM CHLORIDE 1000 MILLILITER(S): 9 INJECTION INTRAMUSCULAR; INTRAVENOUS; SUBCUTANEOUS at 17:40

## 2020-07-13 NOTE — ED PROVIDER NOTE - CARE PROVIDER_API CALL
PMD Dr Azar,   Phone: (   )    -  Fax: (   )    -  Established Patient  Follow Up Time: 1-3 Days    Mo Sandoval (DO)  Internal Medicine  [address] [place] [id number]  Phone: [phone]  Fax: [phone]  Follow Up Time: 1-3 Days

## 2020-07-13 NOTE — ED PROVIDER NOTE - PROVIDER TOKENS
FREE:[LAST:[PMD Dr Azar],PHONE:[(   )    -],FAX:[(   )    -],FOLLOWUP:[1-3 Days],ESTABLISHEDPATIENT:[T]],PROVIDER:[TOKEN:[[id number]:MIIS:[id number]],FOLLOWUP:[1-3 Days]]

## 2020-07-13 NOTE — ED PROVIDER NOTE - PROGRESS NOTE DETAILS
patient ambulated in ED with walker, states feels better,  results discussed with daughter, states her daughter will pick patient up,  copy of results with discharge papers.  rx zofran and pepcid sent to pharmacy,  advised follow up with pmd, if any concerns return to ED

## 2020-07-13 NOTE — ED ADULT NURSE NOTE - OBJECTIVE STATEMENT
88 year old female brought in by EMS from home for complaints of abdominal pain, nausea/vomiting/diarrhea. Patient brought in from home where she lives with her daughter. Patient admits to 4 days of generalized abdominal pain. Patient reports nausea with vomiting and diarrhea. Patient reports today the symptoms have all improved. Patient denies diarrhea today. Patient denies pain in ED on initial RN assessment. Patient nontender on exam. Patient denies fever/chills. Patient alert and oriented to questions, able to follow commands appropriately, but confused intermittently during conversation. Rectal temp on arrival to ED 99.2.

## 2020-07-13 NOTE — ED PROVIDER NOTE - OBJECTIVE STATEMENT
88 y female BIBA, from home states she has had nvd x 4 days, had generalized abdominal pain, but that has resolved.  PMH:  Autoimmune disease    Essential hypertension    Hepatitis  20 years ago  Hypothyroid

## 2020-07-13 NOTE — ED PROVIDER NOTE - ATTENDING CONTRIBUTION TO CARE
87 yo female hx of dementia BIBEMS c/o nausea/vomiting/diarrhea x 4 days with generalized abdominal pain that has resolved.  Denies any chest pain, shortness of breath.  Pt poor historian    Gen: demented but pleasant, nad  Head/eyes: NC/AT, PERRL  ENT: airway patent  Neck: supple, no tenderness/meningismus/JVD, Trachea midline  Pulm/lung: Bilateral clear BS, normal resp effort, no wheeze/stridor/retractions  CV/heart: RRR, no M/R/G, +2 dist pulses (radial, pedal DP/PT, popliteal)  GI/Abd: soft, NT/ND, +BS, no guarding/rebound tenderness  Musculoskeletal: no edema/erythema/cyanosis, FROM in all extremities, no C/T/L spine ttp  Skin: no rash, no vesicles, no petechaie, no ecchymosis, no swelling  Neuro: demented, moving all extremities    labs wnl, CT a/p negative covid negative, antiemetics given, pt tolerating PO here in ED, f/u with pmd

## 2020-07-13 NOTE — ED PROVIDER NOTE - PATIENT PORTAL LINK FT
You can access the FollowMyHealth Patient Portal offered by [facility] by registering at the following website: [email or web address]. By joining [employer]’s FollowMyHealth portal, you will also be able to view your health information using other applications (apps) compatible with our system.

## 2020-07-13 NOTE — ED PROVIDER NOTE - NSFOLLOWUPINSTRUCTIONS_ED_ALL_ED_FT
follow up with Dr Trujillo  call tomorrow to arrange follow up  hydrate  if any concerns , condition worsens return to ED  given information for Dr Sandoval GI   call tomorrow to arrange follow up   advance diet as tolerated

## 2020-07-14 LAB
CULTURE RESULTS: SIGNIFICANT CHANGE UP
SPECIMEN SOURCE: SIGNIFICANT CHANGE UP

## 2020-07-19 LAB
CULTURE RESULTS: SIGNIFICANT CHANGE UP
CULTURE RESULTS: SIGNIFICANT CHANGE UP
SPECIMEN SOURCE: SIGNIFICANT CHANGE UP
SPECIMEN SOURCE: SIGNIFICANT CHANGE UP

## 2020-08-03 ENCOUNTER — INPATIENT (INPATIENT)
Facility: HOSPITAL | Age: 85
LOS: 2 days | Discharge: HOME | DRG: 884 | End: 2020-08-06
Attending: STUDENT IN AN ORGANIZED HEALTH CARE EDUCATION/TRAINING PROGRAM | Admitting: INTERNAL MEDICINE
Payer: COMMERCIAL

## 2020-08-03 VITALS
HEART RATE: 68 BPM | RESPIRATION RATE: 18 BRPM | TEMPERATURE: 98 F | DIASTOLIC BLOOD PRESSURE: 64 MMHG | OXYGEN SATURATION: 99 % | SYSTOLIC BLOOD PRESSURE: 127 MMHG | WEIGHT: 111.99 LBS
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
## 2020-08-03 DIAGNOSIS — E03.9 HYPOTHYROIDISM, UNSPECIFIED: ICD-10-CM

## 2020-08-03 DIAGNOSIS — Z29.9 ENCOUNTER FOR PROPHYLACTIC MEASURES, UNSPECIFIED: ICD-10-CM

## 2020-08-03 DIAGNOSIS — I10 ESSENTIAL (PRIMARY) HYPERTENSION: ICD-10-CM

## 2020-08-03 DIAGNOSIS — S92.912A UNSPECIFIED FRACTURE OF LEFT TOE(S), INITIAL ENCOUNTER FOR CLOSED FRACTURE: ICD-10-CM

## 2020-08-03 DIAGNOSIS — R53.1 WEAKNESS: ICD-10-CM

## 2020-08-03 DIAGNOSIS — M35.9 SYSTEMIC INVOLVEMENT OF CONNECTIVE TISSUE, UNSPECIFIED: ICD-10-CM

## 2020-08-03 DIAGNOSIS — T78.40XA ALLERGY, UNSPECIFIED, INITIAL ENCOUNTER: ICD-10-CM

## 2020-08-03 DIAGNOSIS — Z90.710 ACQUIRED ABSENCE OF BOTH CERVIX AND UTERUS: Chronic | ICD-10-CM

## 2020-08-03 DIAGNOSIS — F32.9 MAJOR DEPRESSIVE DISORDER, SINGLE EPISODE, UNSPECIFIED: ICD-10-CM

## 2020-08-03 DIAGNOSIS — Z90.49 ACQUIRED ABSENCE OF OTHER SPECIFIED PARTS OF DIGESTIVE TRACT: Chronic | ICD-10-CM

## 2020-08-03 DIAGNOSIS — R41.82 ALTERED MENTAL STATUS, UNSPECIFIED: ICD-10-CM

## 2020-08-03 DIAGNOSIS — Z98.89 OTHER SPECIFIED POSTPROCEDURAL STATES: Chronic | ICD-10-CM

## 2020-08-03 LAB
ALBUMIN SERPL ELPH-MCNC: 2.5 G/DL — LOW (ref 3.3–5)
ALP SERPL-CCNC: 69 U/L — SIGNIFICANT CHANGE UP (ref 40–120)
ALT FLD-CCNC: 34 U/L — SIGNIFICANT CHANGE UP (ref 12–78)
ANION GAP SERPL CALC-SCNC: 4 MMOL/L — LOW (ref 5–17)
APPEARANCE UR: CLEAR — SIGNIFICANT CHANGE UP
APTT BLD: 26.2 SEC — LOW (ref 27.5–35.5)
AST SERPL-CCNC: 42 U/L — HIGH (ref 15–37)
BASOPHILS # BLD AUTO: 0 K/UL — SIGNIFICANT CHANGE UP (ref 0–0.2)
BASOPHILS NFR BLD AUTO: 0 % — SIGNIFICANT CHANGE UP (ref 0–2)
BILIRUB SERPL-MCNC: 0.5 MG/DL — SIGNIFICANT CHANGE UP (ref 0.2–1.2)
BILIRUB UR-MCNC: NEGATIVE — SIGNIFICANT CHANGE UP
BUN SERPL-MCNC: 13 MG/DL — SIGNIFICANT CHANGE UP (ref 7–23)
CALCIUM SERPL-MCNC: 8.6 MG/DL — SIGNIFICANT CHANGE UP (ref 8.5–10.1)
CHLORIDE SERPL-SCNC: 102 MMOL/L — SIGNIFICANT CHANGE UP (ref 96–108)
CO2 SERPL-SCNC: 30 MMOL/L — SIGNIFICANT CHANGE UP (ref 22–31)
COLOR SPEC: YELLOW — SIGNIFICANT CHANGE UP
CREAT SERPL-MCNC: 0.68 MG/DL — SIGNIFICANT CHANGE UP (ref 0.5–1.3)
DIFF PNL FLD: ABNORMAL
EOSINOPHIL # BLD AUTO: 0 K/UL — SIGNIFICANT CHANGE UP (ref 0–0.5)
EOSINOPHIL NFR BLD AUTO: 0 % — SIGNIFICANT CHANGE UP (ref 0–6)
GLUCOSE SERPL-MCNC: 92 MG/DL — SIGNIFICANT CHANGE UP (ref 70–99)
GLUCOSE UR QL: NEGATIVE — SIGNIFICANT CHANGE UP
HCT VFR BLD CALC: 34 % — LOW (ref 34.5–45)
HGB BLD-MCNC: 11.4 G/DL — LOW (ref 11.5–15.5)
INR BLD: 1.14 RATIO — SIGNIFICANT CHANGE UP (ref 0.88–1.16)
KETONES UR-MCNC: NEGATIVE — SIGNIFICANT CHANGE UP
LEUKOCYTE ESTERASE UR-ACNC: ABNORMAL
LYMPHOCYTES # BLD AUTO: 1.9 K/UL — SIGNIFICANT CHANGE UP (ref 1–3.3)
LYMPHOCYTES # BLD AUTO: 19 % — SIGNIFICANT CHANGE UP (ref 13–44)
MCHC RBC-ENTMCNC: 31.1 PG — SIGNIFICANT CHANGE UP (ref 27–34)
MCHC RBC-ENTMCNC: 33.5 GM/DL — SIGNIFICANT CHANGE UP (ref 32–36)
MCV RBC AUTO: 92.9 FL — SIGNIFICANT CHANGE UP (ref 80–100)
MONOCYTES # BLD AUTO: 1.6 K/UL — HIGH (ref 0–0.9)
MONOCYTES NFR BLD AUTO: 16 % — HIGH (ref 2–14)
NEUTROPHILS # BLD AUTO: 6.31 K/UL — SIGNIFICANT CHANGE UP (ref 1.8–7.4)
NEUTROPHILS NFR BLD AUTO: 63 % — SIGNIFICANT CHANGE UP (ref 43–77)
NITRITE UR-MCNC: NEGATIVE — SIGNIFICANT CHANGE UP
NRBC # BLD: SIGNIFICANT CHANGE UP /100 WBCS (ref 0–0)
PH UR: 6 — SIGNIFICANT CHANGE UP (ref 5–8)
PLATELET # BLD AUTO: 216 K/UL — SIGNIFICANT CHANGE UP (ref 150–400)
POTASSIUM SERPL-MCNC: 4.7 MMOL/L — SIGNIFICANT CHANGE UP (ref 3.5–5.3)
POTASSIUM SERPL-SCNC: 4.7 MMOL/L — SIGNIFICANT CHANGE UP (ref 3.5–5.3)
PROT SERPL-MCNC: 7.3 G/DL — SIGNIFICANT CHANGE UP (ref 6–8.3)
PROT UR-MCNC: NEGATIVE — SIGNIFICANT CHANGE UP
PROTHROM AB SERPL-ACNC: 13.2 SEC — SIGNIFICANT CHANGE UP (ref 10.6–13.6)
RBC # BLD: 3.66 M/UL — LOW (ref 3.8–5.2)
RBC # FLD: 13.8 % — SIGNIFICANT CHANGE UP (ref 10.3–14.5)
SARS-COV-2 RNA SPEC QL NAA+PROBE: SIGNIFICANT CHANGE UP
SODIUM SERPL-SCNC: 136 MMOL/L — SIGNIFICANT CHANGE UP (ref 135–145)
SP GR SPEC: 1 — LOW (ref 1.01–1.02)
UROBILINOGEN FLD QL: NEGATIVE — SIGNIFICANT CHANGE UP
WBC # BLD: 10.01 K/UL — SIGNIFICANT CHANGE UP (ref 3.8–10.5)
WBC # FLD AUTO: 10.01 K/UL — SIGNIFICANT CHANGE UP (ref 3.8–10.5)

## 2020-08-03 PROCEDURE — 73502 X-RAY EXAM HIP UNI 2-3 VIEWS: CPT | Mod: 26,LT

## 2020-08-03 PROCEDURE — 70450 CT HEAD/BRAIN W/O DYE: CPT | Mod: 26

## 2020-08-03 PROCEDURE — 99223 1ST HOSP IP/OBS HIGH 75: CPT | Mod: GC

## 2020-08-03 PROCEDURE — 73562 X-RAY EXAM OF KNEE 3: CPT | Mod: 26,LT

## 2020-08-03 PROCEDURE — 72125 CT NECK SPINE W/O DYE: CPT | Mod: 26

## 2020-08-03 PROCEDURE — 71045 X-RAY EXAM CHEST 1 VIEW: CPT | Mod: 26

## 2020-08-03 PROCEDURE — 73610 X-RAY EXAM OF ANKLE: CPT | Mod: 26,LT

## 2020-08-03 PROCEDURE — 73630 X-RAY EXAM OF FOOT: CPT | Mod: 26,LT

## 2020-08-03 PROCEDURE — 99285 EMERGENCY DEPT VISIT HI MDM: CPT

## 2020-08-03 PROCEDURE — 93010 ELECTROCARDIOGRAM REPORT: CPT

## 2020-08-03 RX ORDER — LEVOTHYROXINE SODIUM 125 MCG
1 TABLET ORAL
Qty: 0 | Refills: 0 | DISCHARGE

## 2020-08-03 RX ORDER — ACETAMINOPHEN 500 MG
650 TABLET ORAL EVERY 6 HOURS
Refills: 0 | Status: DISCONTINUED | OUTPATIENT
Start: 2020-08-03 | End: 2020-08-06

## 2020-08-03 RX ORDER — SODIUM CHLORIDE 9 MG/ML
1000 INJECTION INTRAMUSCULAR; INTRAVENOUS; SUBCUTANEOUS
Refills: 0 | Status: DISCONTINUED | OUTPATIENT
Start: 2020-08-03 | End: 2020-08-05

## 2020-08-03 RX ORDER — ESCITALOPRAM OXALATE 10 MG/1
5 TABLET, FILM COATED ORAL DAILY
Refills: 0 | Status: DISCONTINUED | OUTPATIENT
Start: 2020-08-03 | End: 2020-08-06

## 2020-08-03 RX ORDER — LORATADINE 10 MG/1
1 TABLET ORAL
Qty: 0 | Refills: 0 | DISCHARGE

## 2020-08-03 RX ORDER — SODIUM CHLORIDE 9 MG/ML
500 INJECTION INTRAMUSCULAR; INTRAVENOUS; SUBCUTANEOUS ONCE
Refills: 0 | Status: COMPLETED | OUTPATIENT
Start: 2020-08-03 | End: 2020-08-03

## 2020-08-03 RX ORDER — LISINOPRIL 2.5 MG/1
1 TABLET ORAL
Qty: 0 | Refills: 0 | DISCHARGE

## 2020-08-03 RX ORDER — METOPROLOL TARTRATE 50 MG
50 TABLET ORAL DAILY
Refills: 0 | Status: DISCONTINUED | OUTPATIENT
Start: 2020-08-03 | End: 2020-08-06

## 2020-08-03 RX ORDER — ENOXAPARIN SODIUM 100 MG/ML
40 INJECTION SUBCUTANEOUS DAILY
Refills: 0 | Status: DISCONTINUED | OUTPATIENT
Start: 2020-08-03 | End: 2020-08-06

## 2020-08-03 RX ORDER — LEVOTHYROXINE SODIUM 125 MCG
75 TABLET ORAL DAILY
Refills: 0 | Status: DISCONTINUED | OUTPATIENT
Start: 2020-08-03 | End: 2020-08-06

## 2020-08-03 RX ADMIN — SODIUM CHLORIDE 500 MILLILITER(S): 9 INJECTION INTRAMUSCULAR; INTRAVENOUS; SUBCUTANEOUS at 15:12

## 2020-08-03 RX ADMIN — SODIUM CHLORIDE 50 MILLILITER(S): 9 INJECTION INTRAMUSCULAR; INTRAVENOUS; SUBCUTANEOUS at 20:56

## 2020-08-03 RX ADMIN — SODIUM CHLORIDE 500 MILLILITER(S): 9 INJECTION INTRAMUSCULAR; INTRAVENOUS; SUBCUTANEOUS at 14:10

## 2020-08-03 NOTE — H&P ADULT - NSICDXPASTMEDICALHX_GEN_ALL_CORE_FT
PAST MEDICAL HISTORY:  Essential hypertension     Hepatitis autoimmune hepatitis 20 years ago    Hypothyroid

## 2020-08-03 NOTE — ED PROVIDER NOTE - CARE PLAN
Principal Discharge DX:	Altered mental status  Secondary Diagnosis:	Weakness  Secondary Diagnosis:	Fall, initial encounter  Secondary Diagnosis:	Toe fracture, left

## 2020-08-03 NOTE — CONSULT NOTE ADULT - ASSESSMENT
Patient examined and evaluated at this time.  Discussed etiology of symptoms with the patient at this time   No surgical intervention at this time, patient is not a candidate for surgical intervention for reduction of fractures to the left foot given the current medical condition and comorbidities of the patient   Will consider conservative treatment of left foot digital fractures at this time  Will discuss x-ray findings and treatment plan with patient's daughter

## 2020-08-03 NOTE — H&P ADULT - NSHPSOCIALHISTORY_GEN_ALL_CORE
denies ever smoking ever using etoh or ever using other illicit drugs   lives at home with daughter  uses a walker   needs help with ADLS such as cooking and changing her clothes   code status: pt unsure denies ever smoking ever using etoh or ever using other illicit drugs   lives at home with daughter  uses a walker   needs help with ADLS such as cooking and changing her clothes   code status: pt unsure-assume Full Code for now

## 2020-08-03 NOTE — H&P ADULT - PROBLEM SELECTOR PLAN 6
chronic  continue home synthroid  f/u TSH in AM hx of autoimmune hepatitis 20 years ago   AST mildly elevated/borderline  -f/u AM CMP

## 2020-08-03 NOTE — ED PROVIDER NOTE - ATTENDING CONTRIBUTION TO CARE
87 yo f who has increasing confusion and frequent falls fell twice today into bathtub co foot pain on left side. needs walker to bear weight and is unable to do so secondary to foot pain  data per acp  on eval:  eldelry plesant female awake alert oriented to person place and estimate of time  no distress  heent nc at mmm perrrla eomi  neck supple  cor irreg ileana  chest cta no pain nor contusions to chest no flail  abd large midline ventral scar well healed soft nt nd no hsm  ext arthritic at knees hiks feet and hands the left foot has erythema with deformity at toes 4/5 neuro vasc intact pt has pain every time she moves her left ext  neuro no acute focal   skin no lac no contusion  plan ct xray p anin meds labs ?admit for increasing confusion and falls

## 2020-08-03 NOTE — H&P ADULT - PROBLEM SELECTOR PLAN 4
pt with swollen and painful L foot s/p fall at home   f/u X ray foot AP lateral oblique L, f/u X ray ankle 3 views left  pain control _______ pt with swollen and painful L foot s/p fall at home   f/u X ray foot AP lateral oblique L, f/u X ray ankle 3 views left  pain control tylneol 650 for mild pain multiple fractures L foot pt with swollen and painful L foot s/p fall at home   X ray foot AP lateral oblique L, f/u X ray ankle 3 views left: There is a fracture of the distal aspect of the proximal phalanx of the great toe. There also fractures of the proximal shafts of the proximal phalanges of toes 3, 4, and 5.  pain control tylneol 650 for mild pain  consult Podiatry multiple fractures L foot pt with swollen and painful L foot s/p fall at home   X ray foot AP lateral oblique L, f/u X ray ankle 3 views left: There is a fracture of the distal aspect of the proximal phalanx of the great toe. There also fractures of the proximal shafts of the proximal phalanges of toes 3, 4, and 5.  pain control tylneol 650mg for mild pain  consult Podiatry

## 2020-08-03 NOTE — GOALS OF CARE CONVERSATION - ADVANCED CARE PLANNING - WHAT MATTERS MOST
Daughter wants to find out what is wrong with her mom and then have her come home but needs help to care for her.

## 2020-08-03 NOTE — ED PROVIDER NOTE - CLINICAL SUMMARY MEDICAL DECISION MAKING FREE TEXT BOX
fall twice 2 days ago. hit back of head. does not take blood thinners. differentials include but not limited to ICH, skull fx, SAH, vert fx, dehydration, anemia, electrolyte abnormality, infection, ext fx. will check labs, EKG, CXR, CT head and neck, ext x-rays, UA

## 2020-08-03 NOTE — CONSULT NOTE ADULT - SUBJECTIVE AND OBJECTIVE BOX
88[facility] digital fractures. Patient states she slipped in the bathroom yesterday and hit her left foot and head. Patient is awake and alert but is not oriented to time and place.  Patient is a poor historian at this time and is resting in bed comfortably. Patient relates to pain in her left foot and is unable to move the left foot. Denies any fever, chills, nausea, vomiting, chest pain, shortness of breath, or calf pain at this time.    REVIEW OF SYSTEMS    PAST MEDICAL & SURGICAL HISTORY:  Hepatitis: autoimmune hepatitis 20 years ago  Hypothyroid  Essential hypertension  H/O abdominal hysterectomy  History of cholecystectomy  S/P appy      Allergies    latex (Rash)  penicillin (Unknown)  sulfa drugs (Unknown)    Intolerances    MEDICATIONS  (STANDING):  enoxaparin Injectable 40 milliGRAM(s) SubCutaneous daily  escitalopram 5 milliGRAM(s) Oral daily  levothyroxine 75 MICROGram(s) Oral daily  metoprolol succinate ER 50 milliGRAM(s) Oral daily  sodium chloride 0.9%. 1000 milliLiter(s) (50 mL/Hr) IV Continuous <Continuous>    MEDICATIONS  (PRN):  acetaminophen   Tablet .. 650 milliGRAM(s) Oral every 6 hours PRN Mild Pain (1 - 3)      Social History:  denies ever smoking ever using etoh or ever using other illicit drugs   lives at home with daughter  uses a walker   needs help with ADLS such as cooking and changing her clothes   code status: pt unsure-assume Full Code for now (03 Aug 2020 16:02)      FAMILY HISTORY:  Family history of stroke  Family history of heart disease      Vital Signs Last 24 Hrs  T(C): 36.8 (03 Aug 2020 19:42), Max: 36.8 (03 Aug 2020 17:42)  T(F): 98.3 (03 Aug 2020 19:42), Max: 98.3 (03 Aug 2020 19:42)  HR: 73 (03 Aug 2020 19:42) (68 - 73)  BP: 149/68 (03 Aug 2020 19:42) (127/64 - 149/68)  BP(mean): --  RR: 18 (03 Aug 2020 19:42) (18 - 18)  SpO2: 99% (03 Aug 2020 19:42) (97% - 99%)    PHYSICAL EXAM:  Vascular: DP & PT palpable bilaterally, Capillary refill 3 seconds, Digital hair absent bilaterally   Neurological: Unable to assess. Patient unable to follow instructions at this time   Musculoskeletal: Deferred to the left secondary to digital fractures and patient unable to follow instructions at this time   Dermatological: Skin of bilateral lower extremity is warm to touch. No open wounds noted to the foot b/l. Maceration noted to the 4th interspace of the left foot, Erythema noted to the dorsal aspect of the left foot from the level of the digits to the level of the metatarsal heads     CBC Full  -  ( 03 Aug 2020 13:04 )  WBC Count : 10.01 K/uL  RBC Count : 3.66 M/uL  Hemoglobin : 11.4 g/dL  Hematocrit : 34.0 %  Platelet Count - Automated : 216 K/uL  Mean Cell Volume : 92.9 fl  Mean Cell Hemoglobin : 31.1 pg  Mean Cell Hemoglobin Concentration : 33.5 gm/dL  Auto Neutrophil # : 6.31 K/uL  Auto Lymphocyte # : 1.90 K/uL  Auto Monocyte # : 1.60 K/uL  Auto Eosinophil # : 0.00 K/uL  Auto Basophil # : 0.00 K/uL  Auto Neutrophil % : 63.0 %  Auto Lymphocyte % : 19.0 %  Auto Monocyte % : 16.0 %  Auto Eosinophil % : 0.0 %  Auto Basophil % : 0.0 %      08-03    136  |  102  |  13  ----------------------------<  92  4.7   |  30  |  0.68    Ca    8.6      03 Aug 2020 13:04    TPro  7.3  /  Alb  2.5<L>  /  TBili  0.5  /  DBili  x   /  AST  42<H>  /  ALT  34  /  AlkPhos  69  08-03  ----------CHEM PANEL----------    PT/INR - ( 03 Aug 2020 13:04 )   PT: 13.2 sec;   INR: 1.14 ratio         PTT - ( 03 Aug 2020 13:04 )  PTT:26.2 sec        Imaging:  < from: Xray Foot AP + Lateral + Oblique, Left (08.03.20 @ 14:06) >  PROCEDURE DATE:08/03/2020          INTERPRETATION:  Left hip with full pelvic view, left knee, left ankle, and left foot. Patient had a fall with local trauma.    Left hip and pelvis. 3 views.    Hips are relatively free of degeneration and are symmetric.    There is a mild left lower lumbar curve.    No bone destruction or fracture is evident.    Left knee. 3 views.    Some arterial calcifications are noted.    There is no effusion.    There are moderate degenerative changes most pronounced in the lateral compartment with loss of joint space and outer spurring.    Left ankle. 4 views.    The ankle is relatively free of degeneration. No bone destruction or fracture is evident.    Left foot. 3 views. There is a fracture of the distal aspect of the proximal phalanx of the great toe. There also fractures of the proximal shafts of the proximal phalanges of toes 3, 4, and 5.    IMPRESSION: Multiple toe fractures as above.    < end of copied text >

## 2020-08-03 NOTE — ED ADULT NURSE NOTE - NSIMPLEMENTINTERV_GEN_ALL_ED
Implemented All Fall with Harm Risk Interventions:  [place] to call system. Call bell, personal items and telephone within reach. Instruct patient to call for assistance. Room bathroom lighting operational. Non-slip footwear when patient is off stretcher. Physically safe environment: no spills, clutter or unnecessary equipment. Stretcher in lowest position, wheels locked, appropriate side rails in place. Provide visual cue, wrist band, yellow gown, etc. Monitor gait and stability. Monitor for mental status changes and reorient to person, place, and time. Review medications for side effects contributing to fall risk. Reinforce activity limits and safety measures with patient and family. Provide visual clues: red socks.

## 2020-08-03 NOTE — H&P ADULT - PROBLEM SELECTOR PLAN 1
AMS and falls likely 2/2 worsening dementia  s/p .5 L NS bolus  fall precautions  out of bed with assistance, ambulate with assistance   nutrition consult   PT consult, will liekly need rehab placement   COVID 19 negative  consult neurology AMS and falls likely 2/2 worsening dementia  s/p .5 L NS bolus  fall precautions  out of bed with assistance, ambulate with assistance   nutrition consult   PT consult, will likely need rehab placement   GOC consult for discussion on code status, pt unsure   COVID 19 negative  consult neurology  attempted multiplt times to call daughter, however phone line was busy, no option to leave a voicemail AMS and falls likely 2/2 worsening dementia  s/p .5 L NS bolus  fall precautions  out of bed with assistance, ambulate with assistance   nutrition consult   PT consult, will likely need rehab placement   GOC consult for discussion on code status, pt unsure   COVID 19 negative  consult neurology Dr Weber   attempted multiple times to call daughter, however phone line was busy, no option to leave a voicemail

## 2020-08-03 NOTE — H&P ADULT - PROBLEM SELECTOR PLAN 8
chronic, controlled  continue home loratadine chronic, BP controlled on admission   cont home dose metoprolol succinate with hold parameters

## 2020-08-03 NOTE — H&P ADULT - HISTORY OF PRESENT ILLNESS
88 year old female with history of autoimmune hepatitis, hypothyroid, HTN, mild dementia (but never officially diagnosed as per daughter) presents with generalized weakness with falls, confusion, and left foot pain.     ED vitals afebrile, HR 68, /64, 99% on room air, RR 18. Hgb 11.4, last known prior 13.1, albumin 2.5, AST 42, UA negative. Pt given .5 L IVF bolus. CT head shows  chronic ischemic changes in both hemispheres with volume loss, no acute abnormality ie no contusion or hemorrhage CT C spine no acute fracture identified. Underlying severe scoliosis and chronic degenerative changes. EKG ________ 88 year old female with history of autoimmune hepatitis, hypothyroid, HTN, mild dementia (but never officially diagnosed as per daughter) presents with generalized weakness with falls, confusion, and left foot pain. Pt takes from charts and patient, (daughter not answering phone when attempted to reach, no option for voicemail). Pt states she slid in the bathroom yesterday and hit her left foot and head. Pt denies lightheadedness, dizziness, palpitations, or chest pain prior to the fall. Pt also states she has decreased PO intake due to no appetite x ~3 days. Pt states prior to this she had a fall 2 weeks ago, last known prior fall was over twenty years ago. Pt denies fever, chills, CP, cough, SOB, abdominal pain, constipation, diarrhea, edema. As per chart review daughter stated pt has been needing to use a walker due to increased weakness for approx 1 week. Daughter had found patient after fall 2 weeks ago, hit the back of her head, no LOC, was not using a walker at that time. Pt fell again 8 hours later in the restroom, was using a walker at that time, walker was on top of her, and daughter noted pt's left foot appeared swollen and painful and pt had vomited x 1. Patient also appeared confused yesterday, "mumbling" her words, told her daughter her "head felt full". Daughter states pt appeared to improve after a milkshake, may have been dehydrated, has had a similar symptoms in the past but to a lesser severity. Pt's grandson was attempting to help pt into the car for her doctor appointment, however was was too weak to get into the car, EMS was called at that time. As per daughter pt has been having yellowish diarrhea x couple of days.     ED vitals afebrile, HR 68, /64, 99% on room air, RR 18. Hgb 11.4, last known prior 13.1, albumin 2.5, AST 42, UA negative. Pt given .5 L IVF bolus. CT head shows  chronic ischemic changes in both hemispheres with volume loss, no acute abnormality ie no contusion or hemorrhage CT C spine no acute fracture identified. Underlying severe scoliosis and chronic degenerative changes. EKG 65 bpm NSR no ischemic changes noted. 88 year old female with history of autoimmune hepatitis, hypothyroid, HTN, mild dementia (but never officially diagnosed as per daughter) presents with generalized weakness with falls, confusion, and left foot pain. Hx taken from charts and patient, (daughter not answering phone when attempted to reach, no option for voicemail). Pt states she slid in the bathroom yesterday and hit her left foot and head. Pt denies lightheadedness, dizziness, palpitations, or chest pain prior to the fall. Pt also states she has decreased PO intake due to no appetite x ~3 days. Pt states prior to this she had a fall 2 weeks ago, last known prior fall was over twenty years ago. Pt denies fever, chills, CP, cough, SOB, abdominal pain, constipation, diarrhea, edema. As per chart review daughter stated pt has been needing to use a walker due to increased weakness for approx 1 week. Daughter had found patient after fall 2 weeks ago, hit the back of her head, no LOC, was not using a walker at that time. Pt fell again 8 hours later in the restroom, was using a walker at that time, walker was on top of her, and daughter noted pt's left foot appeared swollen and painful and pt had vomited x 1. Patient also appeared confused yesterday, "mumbling" her words, told her daughter her "head felt full". Daughter states pt appeared to improve after a milkshake, may have been dehydrated, has had a similar symptoms in the past but to a lesser severity. Pt's grandson was attempting to help pt into the car for her doctor appointment, however was was too weak to get into the car, EMS was called at that time. As per daughter pt has been having yellowish diarrhea x couple of days.     ED vitals afebrile, HR 68, /64, 99% on room air, RR 18. Hgb 11.4, last known prior 13.1, albumin 2.5, AST 42, UA negative. Pt given .5 L IVF bolus. CT head shows  chronic ischemic changes in both hemispheres with volume loss, no acute abnormality ie no contusion or hemorrhage CT C spine no acute fracture identified. Underlying severe scoliosis and chronic degenerative changes. EKG 65 bpm NSR no ischemic changes noted.

## 2020-08-03 NOTE — H&P ADULT - ASSESSMENT
88 year old female with history of autoimmune hepatitis, hypothyroid, HTN, mild dementia (but never officially diagnosed as per daughter) presents with generalized weakness with falls, confusion, and left foot pain, admit for falls and AMS likely 2/2 worsening dementia, will likely need rehab placement.

## 2020-08-03 NOTE — H&P ADULT - NSHPPHYSICALEXAM_GEN_ALL_CORE
Vital Signs Last 24 Hrs  T(C): 36.6 (03 Aug 2020 12:09), Max: 36.6 (03 Aug 2020 12:09)  T(F): 97.8 (03 Aug 2020 12:09), Max: 97.8 (03 Aug 2020 12:09)  HR: 68 (03 Aug 2020 12:09) (68 - 68)  BP: 127/64 (03 Aug 2020 12:09) (127/64 - 127/64)  BP(mean): --  RR: 18 (03 Aug 2020 12:09) (18 - 18)  SpO2: 99% (03 Aug 2020 12:09) (99% - 99%)    Physical Exam:  General: elderly, pleasant, NAD  HEENT: Normocephallic Atraumatic, PERRLA, EOMI bl, moist mucous membranes   Neck: Supple, nontender  Neurology: +4/5 motor strength b/l UEs and LEs, oriented to name place and situation, not oriented to year (states 1903), , CNII-XII intact  Respiratory: Clear To Auscultation B/L, No Wheezes, rhonchi or rales  CV: Regular Rate and Rhythm, +S1/S2, no murmurs, rubs or gallops  Abdominal: Soft, Non-Tender, Non-Distended +Bowel Soundsx4  Extremities: left lateral dorsum of foot erythematous with TTP on No Clubbing, cyanosis or edema, + peripheral pulses: cap refill <2 secs LE bilaterally  Skin: warm, dry, normal color

## 2020-08-03 NOTE — H&P ADULT - NSHPREVIEWOFSYSTEMS_GEN_ALL_CORE
CONSTITUTIONAL: admits to weakness, denies fever, chills, muscle aches   HEENT: denies blurred visions, sore throat  SKIN: denies new lesions, rash  CARDIOVASCULAR: denies chest pain, chest pressure, palpitations  RESPIRATORY: denies shortness of breath, sputum production  GASTROINTESTINAL: denies diarrhea, constipation abdominal pain  GENITOURINARY: denies dysuria, hematuria   NEUROLOGICAL: denies numbness, headache  EXTREMITIES: left lateral foot pain and redness CONSTITUTIONAL: admits to weakness, denies fever, chills, muscle aches   HEENT: denies blurred visions, sore throat  SKIN: denies new lesions, rash  CARDIOVASCULAR: denies chest pain, chest pressure, palpitations  RESPIRATORY: denies shortness of breath, sputum production  GASTROINTESTINAL: denies diarrhea, constipation abdominal pain, melena, hematochezia  GENITOURINARY: denies dysuria, hematuria   NEUROLOGICAL: denies numbness, headache  EXTREMITIES: left lateral foot pain and redness

## 2020-08-03 NOTE — ED PROVIDER NOTE - NEUROLOGICAL, MLM
Alert and oriented to person and place. mild confusion. able to follow commands. mild confusion when answering some questions

## 2020-08-03 NOTE — ED ADULT NURSE NOTE - OBJECTIVE STATEMENT
87 y/o female presents to the ed c/o of ams, left foot pain, and not feeling well 87 y/o female presents to the ed c/o of ams, left foot pain, and not feeling well. as per daughter, the pt fell 2 days ago in the tub and hit head with unk ams. fell a second time that day. has become progressively weaker over the past few days. denies nausea vomiting fever chilsl chest pain sob headache abdominal pain and dysuria

## 2020-08-03 NOTE — ED PROVIDER NOTE - PROGRESS NOTE DETAILS
presents with weakness with 2 recent falls, increased confusion and left toe fractures. patient not safe discharge at this time.   spoke with Dr. TIN Navarrete, kindly accepts patient for admission.

## 2020-08-03 NOTE — ED PROVIDER NOTE - OBJECTIVE STATEMENT
88 year old female with history of autoimmune hepatitis, hypothyroid, and HTN presents with generalized weakness, confusion, and left foot pain. has been needing to use a walker due to increased weakness the past week per daughter. daughter found patient in bathroom floor 2 days ago after wall. was not using a walker at that time. daughter states patient hit back of head but no LOC. does not take any blood thinners. patient fell again 8 hours later in bathroom. this time had walker with her and was on top of her. left foot appeared swollen and painful. patient seemed confused yesterday and was "mumbling" her words. was telling her daughter that "her head felt full". gave patient a milkshake and seemed to be better. daughter thought patient was dehydrated. history of similar symptoms (but not this severe) and would get better when she would give her gatorade. "seems a little better" today. grandson came over to try and help to get her In car to go see doctor but could not get her to car due to weakness so called EMS. per daughter "patient may have mild dementia but never diagnosed". daughter reports she vomited once after she fell the second time and has been having some yellowish diarrhea past couple days. no fevers. patient reports mild HA and mild neck pain. no chest pain, abd pain, n/v, or low back pain.   PCP Je Godinez

## 2020-08-03 NOTE — H&P ADULT - PROBLEM SELECTOR PLAN 9
dvt ppx lovenox 40     IMPROVE VTE Individual Risk Assessment        RISK                                                          Points  [  ] Previous VTE                                                3  [  ] Thrombophilia                                             2  [  ] Lower limb paralysis                                   2        (unable to hold up >15 seconds)    [  ] Current Cancer                                            2         (within 6 months)  [  ] Immobilization > 24 hrs                              1  [  ] ICU/CCU stay > 24 hours                            1  [ x ] Age > 60                                                    1  IMPROVE VTE Score ____1_____

## 2020-08-03 NOTE — H&P ADULT - PROBLEM SELECTOR PLAN 3
generalized weakness 2/2 decreased PO intake in setting of dementia   DASH/TLC diet, ensure TID   nutrition consult generalized weakness 2/2 decreased PO intake in setting of dementia, pt never offically diagnosed with dementia   DASH/TLC diet, ensure TID   nutrition consult generalized weakness 2/2 decreased PO intake in setting of dementia, pt never officially diagnosed with dementia   NS at rate 50 for 12 hours  DASH/TLC diet  nutrition consult generalized weakness 2/2 decreased PO intake in setting of dementia, pt never officially diagnosed with dementia   NS at rate 50 ml/hr for 12 hours  DASH/TLC diet  nutrition consult

## 2020-08-03 NOTE — ED PROVIDER NOTE - MUSCULOSKELETAL, MLM
Spine appears normal, range of motion is not limited, no hip joint tenderness. mild tenderness to left knee. +tenderness and swelling to left foot. no deformity. limited ROM of left leg due to pain

## 2020-08-03 NOTE — H&P ADULT - PROBLEM SELECTOR PLAN 7
chronic, BP controlled on admission   cont home dose metoprolol succinate, lisinopril with hold parameters chronic  continue home synthroid  f/u TSH in AM

## 2020-08-04 LAB
ALBUMIN SERPL ELPH-MCNC: 2.3 G/DL — LOW (ref 3.3–5)
ALP SERPL-CCNC: 67 U/L — SIGNIFICANT CHANGE UP (ref 40–120)
ALT FLD-CCNC: 28 U/L — SIGNIFICANT CHANGE UP (ref 12–78)
ANION GAP SERPL CALC-SCNC: 4 MMOL/L — LOW (ref 5–17)
AST SERPL-CCNC: 35 U/L — SIGNIFICANT CHANGE UP (ref 15–37)
BASOPHILS # BLD AUTO: 0.04 K/UL — SIGNIFICANT CHANGE UP (ref 0–0.2)
BASOPHILS NFR BLD AUTO: 0.5 % — SIGNIFICANT CHANGE UP (ref 0–2)
BILIRUB SERPL-MCNC: 0.6 MG/DL — SIGNIFICANT CHANGE UP (ref 0.2–1.2)
BUN SERPL-MCNC: 9 MG/DL — SIGNIFICANT CHANGE UP (ref 7–23)
CALCIUM SERPL-MCNC: 8.4 MG/DL — LOW (ref 8.5–10.1)
CHLORIDE SERPL-SCNC: 105 MMOL/L — SIGNIFICANT CHANGE UP (ref 96–108)
CO2 SERPL-SCNC: 31 MMOL/L — SIGNIFICANT CHANGE UP (ref 22–31)
CREAT SERPL-MCNC: 0.62 MG/DL — SIGNIFICANT CHANGE UP (ref 0.5–1.3)
CULTURE RESULTS: SIGNIFICANT CHANGE UP
EOSINOPHIL # BLD AUTO: 0.14 K/UL — SIGNIFICANT CHANGE UP (ref 0–0.5)
EOSINOPHIL NFR BLD AUTO: 1.7 % — SIGNIFICANT CHANGE UP (ref 0–6)
FOLATE SERPL-MCNC: 12.9 NG/ML — SIGNIFICANT CHANGE UP
GLUCOSE SERPL-MCNC: 90 MG/DL — SIGNIFICANT CHANGE UP (ref 70–99)
HCT VFR BLD CALC: 34.6 % — SIGNIFICANT CHANGE UP (ref 34.5–45)
HGB BLD-MCNC: 11.5 G/DL — SIGNIFICANT CHANGE UP (ref 11.5–15.5)
IMM GRANULOCYTES NFR BLD AUTO: 0.5 % — SIGNIFICANT CHANGE UP (ref 0–1.5)
LYMPHOCYTES # BLD AUTO: 1.85 K/UL — SIGNIFICANT CHANGE UP (ref 1–3.3)
LYMPHOCYTES # BLD AUTO: 22.2 % — SIGNIFICANT CHANGE UP (ref 13–44)
MCHC RBC-ENTMCNC: 30.8 PG — SIGNIFICANT CHANGE UP (ref 27–34)
MCHC RBC-ENTMCNC: 33.2 GM/DL — SIGNIFICANT CHANGE UP (ref 32–36)
MCV RBC AUTO: 92.8 FL — SIGNIFICANT CHANGE UP (ref 80–100)
MONOCYTES # BLD AUTO: 1.08 K/UL — HIGH (ref 0–0.9)
MONOCYTES NFR BLD AUTO: 13 % — SIGNIFICANT CHANGE UP (ref 2–14)
NEUTROPHILS # BLD AUTO: 5.17 K/UL — SIGNIFICANT CHANGE UP (ref 1.8–7.4)
NEUTROPHILS NFR BLD AUTO: 62.1 % — SIGNIFICANT CHANGE UP (ref 43–77)
NRBC # BLD: 0 /100 WBCS — SIGNIFICANT CHANGE UP (ref 0–0)
PLATELET # BLD AUTO: 226 K/UL — SIGNIFICANT CHANGE UP (ref 150–400)
POTASSIUM SERPL-MCNC: 4.3 MMOL/L — SIGNIFICANT CHANGE UP (ref 3.5–5.3)
POTASSIUM SERPL-SCNC: 4.3 MMOL/L — SIGNIFICANT CHANGE UP (ref 3.5–5.3)
PROT SERPL-MCNC: 7 G/DL — SIGNIFICANT CHANGE UP (ref 6–8.3)
RBC # BLD: 3.73 M/UL — LOW (ref 3.8–5.2)
RBC # FLD: 13.7 % — SIGNIFICANT CHANGE UP (ref 10.3–14.5)
SARS-COV-2 IGG SERPL QL IA: NEGATIVE — SIGNIFICANT CHANGE UP
SARS-COV-2 IGM SERPL IA-ACNC: <0.1 INDEX — SIGNIFICANT CHANGE UP
SODIUM SERPL-SCNC: 140 MMOL/L — SIGNIFICANT CHANGE UP (ref 135–145)
SPECIMEN SOURCE: SIGNIFICANT CHANGE UP
TSH SERPL-MCNC: 1.27 UIU/ML — SIGNIFICANT CHANGE UP (ref 0.36–3.74)
TSH SERPL-MCNC: 1.28 UIU/ML — SIGNIFICANT CHANGE UP (ref 0.36–3.74)
VIT B12 SERPL-MCNC: 532 PG/ML — SIGNIFICANT CHANGE UP (ref 232–1245)
WBC # BLD: 8.32 K/UL — SIGNIFICANT CHANGE UP (ref 3.8–10.5)
WBC # FLD AUTO: 8.32 K/UL — SIGNIFICANT CHANGE UP (ref 3.8–10.5)

## 2020-08-04 PROCEDURE — 99233 SBSQ HOSP IP/OBS HIGH 50: CPT

## 2020-08-04 RX ADMIN — ENOXAPARIN SODIUM 40 MILLIGRAM(S): 100 INJECTION SUBCUTANEOUS at 11:15

## 2020-08-04 RX ADMIN — Medication 50 MILLIGRAM(S): at 06:08

## 2020-08-04 RX ADMIN — ESCITALOPRAM OXALATE 5 MILLIGRAM(S): 10 TABLET, FILM COATED ORAL at 11:15

## 2020-08-04 RX ADMIN — Medication 75 MICROGRAM(S): at 06:08

## 2020-08-04 NOTE — PROGRESS NOTE ADULT - SUBJECTIVE AND OBJECTIVE BOX
Neurology follow up note    JOESPH [id number]      Interval History:    Patient feels ok no new complaints.    MEDICATIONS    acetaminophen   Tablet .. 650 milliGRAM(s) Oral every 6 hours PRN  enoxaparin Injectable 40 milliGRAM(s) SubCutaneous daily  escitalopram 5 milliGRAM(s) Oral daily  levothyroxine 75 MICROGram(s) Oral daily  metoprolol succinate ER 50 milliGRAM(s) Oral daily  sodium chloride 0.9%. 1000 milliLiter(s) IV Continuous <Continuous>      Allergies    latex (Rash)  penicillin (Unknown)  sulfa drugs (Unknown)    Intolerances          Weight (kg): 50.8 (-03 @ 12:09)    Vital Signs Last 24 Hrs  T(C): 36.8 (04 Aug 2020 09:30), Max: 37 (03 Aug 2020 23:40)  T(F): 98.3 (04 Aug 2020 09:30), Max: 98.6 (03 Aug 2020 23:40)  HR: 67 (04 Aug 2020 09:30) (62 - 75)  BP: 142/63 (04 Aug 2020 09:30) (127/64 - 180/72)  BP(mean): --  RR: 16 (04 Aug 2020 09:30) (16 - 19)  SpO2: 96% (04 Aug 2020 09:30) (94% - 99%)    REVIEW OF SYSTEMS:  Constitutional:  The patient denies fever, chills, or night sweats.  Head:  No headache.  Eyes:  No double vision or blurry vision.  Ears:  No ringing in her ears.  Neck:  No neck pain.  Respiratory:  No shortness of breath.  Cardiovascular:  No chest pain.  Abdomen:  No nausea, vomiting, or abdominal pain.  Extremities/Neurologic:  No numbness or tingling.  Musculoskeletal:  Positive history of joint pain and also left foot pain.    PHYSICAL EXAMINATION:  HEENT:  Head:  Normocephalic, atraumatic.  Eyes:  No scleral icterus.  Ears:  Hearing bilaterally intact.  NECK:  Supple.  RESPIRATORY:  Good air entry bilaterally.  CARDIOVASCULAR:  S1 and S2 heard.  ABDOMEN:  Soft and nontender.  EXTREMITIES:  No clubbing or cyanosis were noted.      NEUROLOGIC:  The patient is awake and alert.   Extraocular movements were intact.  Pupils were equal, round, and reactive bilaterally 3 mm to 2 mm.  Speech was fluent.  Smile was symmetric.  Motor:  Bilateral upper were 4/5, bilateral lower were 4-/5, has decreased range of motion of the left foot secondary to fractures.              LABS:  CBC Full  -  ( 04 Aug 2020 07:03 )  WBC Count : 8.32 K/uL  RBC Count : 3.73 M/uL  Hemoglobin : 11.5 g/dL  Hematocrit : 34.6 %  Platelet Count - Automated : 226 K/uL  Mean Cell Volume : 92.8 fl  Mean Cell Hemoglobin : 30.8 pg  Mean Cell Hemoglobin Concentration : 33.2 gm/dL  Auto Neutrophil # : 5.17 K/uL  Auto Lymphocyte # : 1.85 K/uL  Auto Monocyte # : 1.08 K/uL  Auto Eosinophil # : 0.14 K/uL  Auto Basophil # : 0.04 K/uL  Auto Neutrophil % : 62.1 %  Auto Lymphocyte % : 22.2 %  Auto Monocyte % : 13.0 %  Auto Eosinophil % : 1.7 %  Auto Basophil % : 0.5 %    Urinalysis Basic - ( 03 Aug 2020 14:10 )    Color: Yellow / Appearance: Clear / S.005 / pH: x  Gluc: x / Ketone: Negative  / Bili: Negative / Urobili: Negative   Blood: x / Protein: Negative / Nitrite: Negative   Leuk Esterase: Trace / RBC: 0-2 /HPF / WBC 3-5   Sq Epi: x / Non Sq Epi: Occasional / Bacteria: Few      08-04    140  |  105  |  9   ----------------------------<  90  4.3   |  31  |  0.62    Ca    8.4<L>      04 Aug 2020 07:03    TPro  7.0  /  Alb  2.3<L>  /  TBili  0.6  /  DBili  x   /  AST  35  /  ALT  28  /  AlkPhos  67  08-04    Hemoglobin A1C:     LIVER FUNCTIONS - ( 04 Aug 2020 07:03 )  Alb: 2.3 g/dL / Pro: 7.0 g/dL / ALK PHOS: 67 U/L / ALT: 28 U/L / AST: 35 U/L / GGT: x           Vitamin B12   PT/INR - ( 03 Aug 2020 13:04 )   PT: 13.2 sec;   INR: 1.14 ratio         PTT - ( 03 Aug 2020 13:04 )  PTT:26.2 sec      RADIOLOGY      ANALYSIS AND PLAN:  An 88-year-old with an episode of altered mental status and history of falls.  1.	For altered mental status, suspect this could be secondary to underlying mild cognitive impairment versus subtle dementia, becoming more prominent in the hospital setting.   2.	For mild cognitive impairment versus subtle  3.	For history of falls, possibly secondary to age-related changes, would recommend physical therapy, less likely this is from a cerebrovascular accident that has been going on for the last few months.  4.	For history of hypothyroidism, continue the patient on Synthroid.  5.	For history of hypertension, monitor systolic blood pressure.  6.	Fall precautions, safety concerns were provided to the family.  7.	DaughterAmina's telephone number is [phone].  Her cell number is [phone]. 2020  8.	Greater than 45 minutes of time was spent with the patient, plan of care, reviewing data, speaking to the family and multidisciplinary healthcare team.

## 2020-08-04 NOTE — GOALS OF CARE CONVERSATION - ADVANCED CARE PLANNING - CONVERSATION DETAILS
Spoke with  pt unsure of info. Pt has some dementia. Called her dtr Amina Ashford. She states her mothe does not want resuscitation or feeding rtubes. Benjamin started Dr Dominique aware. Dr Dumont to complete BENJAMIN
Consult noted. Spoke with resident Dr De La Cruz who has been unable to reach daughter. As per  pt has some confusion, unable to make medical decisions.  I spoke with daughter Amina Ashford whom pt lives with and is her care taker.  Daughter states that she knows her moms wishes would be to not be resuscitated or placed on a ventilator.  She expressed that she would like her mom at this time to be a DNR/DNI. She  requests no feeding tubes.  Further treatment guidelines would be to have IV fliuds / medications /antibioticics, pain meds if necessary to maintain pts comfort.  In the event pt is discharged I will recommend a Social work consult. Daughter states it is getting difficult for her to care for her mom by herself and wants her to come home.  Daughter will try to get to hospital Tuesday or wed to fill out [employer] form. She herself has a broken ankle.  Dr De La Cruz called and aware of above and will call Amina in regards to DNR/DNI.

## 2020-08-04 NOTE — PATIENT PROFILE ADULT - NSASFALLWHENOCCURRED_GEN_A_NUR
Encourage caloric restricted, salt restricted weight reduction diet with exercise.  Follow BMI.     last six months

## 2020-08-04 NOTE — PROGRESS NOTE ADULT - PROBLEM SELECTOR PLAN 3
acute on chronic  generalized weakness 2/2 decreased PO intake in setting of dementia  advanced age  NS at rate 50 ml/hr for 12 hours  DASH/TLC diet  nutrition consult

## 2020-08-04 NOTE — GOALS OF CARE CONVERSATION - ADVANCED CARE PLANNING - TREATMENT GUIDELINES
IV fluid trial/DNR Order/No artificial nutrition/dni/Antibiotic trial
DNR Order/No artificial nutrition

## 2020-08-04 NOTE — ED ADULT NURSE REASSESSMENT NOTE - NS ED NURSE REASSESS COMMENT FT1
Attempted to call report to 1 anna Lew RN to call back.
Patient sitting up in bed eating her breakfast.
Report received from Fatou ROUSE. Patient received in hospital bed, sleeping @ this time, respirations even and unlabored. Patient on cardiac monitor IV infusing. Patient awaiting inpatient bed.
pt resting in bed with no complaints, resp even and unlabored, vitals are as charted, aware of plan of care and will continue to monitor
Patient now awake in stretcher alert and oriented x 2 (place and person) disoriented to time and situation. Patient denies any complaints @ this time no chest pain or shortness of breath but on exam endorses discomfort w manipulation of left ankle/knee and foot + TTP. Patient reoriented, call bell within reach and fall precautions maintained.   Patient on cardiac monitor NSR, VSS and evaluation ongoing.

## 2020-08-04 NOTE — PHYSICAL THERAPY INITIAL EVALUATION ADULT - ADDITIONAL COMMENTS
Pt lives with her daughter in a house, + steps. Pt ambulates independently w/ RW and is independent with ADLs

## 2020-08-04 NOTE — PHYSICAL THERAPY INITIAL EVALUATION ADULT - PERTINENT HX OF CURRENT PROBLEM, REHAB EVAL
89 y/o female adm 8/3 with L foot pain after slipping and falling. + L 3rd, 4th and 5th toe fx. No ankle fx. L hip and knee: no fx. CT head: no events.

## 2020-08-04 NOTE — PATIENT PROFILE ADULT - IS THERE A SUSPICION OF ABUSE/NEGLIGENCE?
To Whom it may concern:      Otto Curran was seen in our Emergency Department today, 09/01/17. No heavy lifting, activity as tolerated until seen by primary care physician.     Sincerely,          Fern Christian RN    
no

## 2020-08-04 NOTE — PROGRESS NOTE ADULT - SUBJECTIVE AND OBJECTIVE BOX
Patient is a 88y old  Female who presents with a chief complaint of weakness, confusion, left foot pain (03 Aug 2020 20:35)      INTERVAL HPI:  OVERNIGHT EVENTS:  T(F): 98.5 (20 @ 06:00), Max: 98.6 (20 @ 23:40)  HR: 69 (20 @ 06:00) (62 - 75)  BP: 169/74 (20 @ 06:00) (127/64 - 180/72)  RR: 18 (20 @ 06:00) (17 - 19)  SpO2: 96% (20 @ 06:00) (94% - 99%)  I&O's Summary      REVIEW OF SYSTEMS:  CONSTITUTIONAL: No fever, weight loss, or fatigue  EYES: No eye pain, visual disturbances, or discharge  ENMT:  No difficulty hearing, tinnitus, vertigo; No sinus or throat pain  NECK: No pain or stiffness  BREASTS: No pain, masses, or nipple discharge  RESPIRATORY: No cough, wheezing, chills or hemoptysis; No shortness of breath  CARDIOVASCULAR: No chest pain, palpitations, dizziness, or leg swelling  GASTROINTESTINAL: No abdominal or epigastric pain. No nausea, vomiting, or hematemesis; No diarrhea or constipation. No melena or hematochezia.  GENITOURINARY: No dysuria, frequency, hematuria, or incontinence  NEUROLOGICAL: No headaches, memory loss, loss of strength, numbness, or tremors  SKIN: No itching, burning, rashes, or lesions   LYMPH NODES: No enlarged glands  ENDOCRINE: No heat or cold intolerance; No hair loss  MUSCULOSKELETAL: No joint pain or swelling; No muscle, back, or extremity pain  PSYCHIATRIC: No depression, anxiety, mood swings, or difficulty sleeping  HEME/LYMPH: No easy bruising, or bleeding gums  ALLERY AND IMMUNOLOGIC: No hives or eczema    PHYSICAL EXAM:  GENERAL: NAD, well-groomed, well-developed  HEAD:  Atraumatic, Normocephalic  EYES: EOMI, PERRLA, conjunctiva and sclera clear  ENMT: No tonsillar erythema, exudates, or enlargement; Moist mucous membranes, Good dentition, No lesions  NECK: Supple, No JVD, Normal thyroid  NERVOUS SYSTEM:  Alert & Oriented X3, Good concentration; Motor Strength 5/5 B/L upper and lower extremities; DTRs 2+ intact and symmetric  CHEST/LUNG: Clear to percussion bilaterally; No rales, rhonchi, wheezing, or rubs  HEART: Regular rate and rhythm; No murmurs, rubs, or gallops  ABDOMEN: Soft, Nontender, Nondistended; Bowel sounds present  EXTREMITIES:  2+ Peripheral Pulses, No clubbing, cyanosis, or edema  LYMPH: No lymphadenopathy noted  SKIN: No rashes or lesions    LABS:                        11.5   8.32  )-----------( 226      ( 04 Aug 2020 07:03 )             34.6     08-04    140  |  105  |  9   ----------------------------<  90  4.3   |  31  |  0.62    Ca    8.4<L>      04 Aug 2020 07:03    TPro  7.0  /  Alb  2.3<L>  /  TBili  0.6  /  DBili  x   /  AST  35  /  ALT  28  /  AlkPhos  67  08-04    PT/INR - ( 03 Aug 2020 13:04 )   PT: 13.2 sec;   INR: 1.14 ratio         PTT - ( 03 Aug 2020 13:04 )  PTT:26.2 sec  Urinalysis Basic - ( 03 Aug 2020 14:10 )    Color: Yellow / Appearance: Clear / S.005 / pH: x  Gluc: x / Ketone: Negative  / Bili: Negative / Urobili: Negative   Blood: x / Protein: Negative / Nitrite: Negative   Leuk Esterase: Trace / RBC: 0-2 /HPF / WBC 3-5   Sq Epi: x / Non Sq Epi: Occasional / Bacteria: Few      CAPILLARY BLOOD GLUCOSE                  MEDICATIONS  (STANDING):  enoxaparin Injectable 40 milliGRAM(s) SubCutaneous daily  escitalopram 5 milliGRAM(s) Oral daily  levothyroxine 75 MICROGram(s) Oral daily  metoprolol succinate ER 50 milliGRAM(s) Oral daily  sodium chloride 0.9%. 1000 milliLiter(s) (50 mL/Hr) IV Continuous <Continuous>    MEDICATIONS  (PRN):  acetaminophen   Tablet .. 650 milliGRAM(s) Oral every 6 hours PRN Mild Pain (1 - 3) Patient is a 88y old  Female who presents with a chief complaint of weakness, confusion, left foot pain (03 Aug 2020 20:35)      INTERVAL HPI: Pt seen and examined. States she is feeling better, denies any acute complaints at this time, has noted she has been weaker. Limited historian as has history of some dementia.     OVERNIGHT EVENTS: none noted  T(F): 98.5 (20 @ 06:00), Max: 98.6 (20 @ 23:40)  HR: 69 (20 @ 06:00) (62 - 75)  BP: 169/74 (20 @ 06:00) (127/64 - 180/72)  RR: 18 (20 @ 06:00) (17 - 19)  SpO2: 96% (20 @ 06:00) (94% - 99%)  I&O's Summary      REVIEW OF SYSTEMS:  CONSTITUTIONAL: No fever, weight loss, or fatigue  RESPIRATORY: No cough, wheezing, chills or hemoptysis; No shortness of breath  CARDIOVASCULAR: No chest pain, palpitations, dizziness, or leg swelling  GASTROINTESTINAL: No abdominal or epigastric pain. No nausea, vomiting, or hematemesis; No diarrhea or constipation. No melena or hematochezia.  GENITOURINARY: No dysuria, frequency, hematuria, or incontinence  NEUROLOGICAL: No headaches, ++ memory loss, loss of strength, numbness, or tremors  SKIN: No itching, burning, rashes, or lesions   LYMPH NODES: No enlarged glands  ENDOCRINE: No heat or cold intolerance; No hair loss  MUSCULOSKELETAL: No joint pain or swelling; No muscle, back, or extremity pain  PSYCHIATRIC: No depression, anxiety, mood swings, or difficulty sleeping      PHYSICAL EXAM:  GENERAL: NAD, well-groomed, elder  NERVOUS SYSTEM:  Alert & Oriented X3, Good concentration; Motor Strength 3/5 B/L upper and lower extremities;   CHEST/LUNG: Clear to percussion bilaterally; No rales, rhonchi, wheezing, or rubs  HEART: Regular rate and rhythm; No murmurs, rubs, or gallops  ABDOMEN: Soft, Nontender, Nondistended; Bowel sounds present  EXTREMITIES:  2+ Peripheral Pulses, No clubbing, cyanosis, or edema  SKIN: No rashes or lesions    LABS:                        11.5   8.32  )-----------( 226      ( 04 Aug 2020 07:03 )             34.6     08-04    140  |  105  |  9   ----------------------------<  90  4.3   |  31  |  0.62    Ca    8.4<L>      04 Aug 2020 07:03    TPro  7.0  /  Alb  2.3<L>  /  TBili  0.6  /  DBili  x   /  AST  35  /  ALT  28  /  AlkPhos  67  08-04    PT/INR - ( 03 Aug 2020 13:04 )   PT: 13.2 sec;   INR: 1.14 ratio         PTT - ( 03 Aug 2020 13:04 )  PTT:26.2 sec  Urinalysis Basic - ( 03 Aug 2020 14:10 )    Color: Yellow / Appearance: Clear / S.005 / pH: x  Gluc: x / Ketone: Negative  / Bili: Negative / Urobili: Negative   Blood: x / Protein: Negative / Nitrite: Negative   Leuk Esterase: Trace / RBC: 0-2 /HPF / WBC 3-5   Sq Epi: x / Non Sq Epi: Occasional / Bacteria: Few      CAPILLARY BLOOD GLUCOSE                  MEDICATIONS  (STANDING):  enoxaparin Injectable 40 milliGRAM(s) SubCutaneous daily  escitalopram 5 milliGRAM(s) Oral daily  levothyroxine 75 MICROGram(s) Oral daily  metoprolol succinate ER 50 milliGRAM(s) Oral daily  sodium chloride 0.9%. 1000 milliLiter(s) (50 mL/Hr) IV Continuous <Continuous>    MEDICATIONS  (PRN):  acetaminophen   Tablet .. 650 milliGRAM(s) Oral every 6 hours PRN Mild Pain (1 - 3)

## 2020-08-05 ENCOUNTER — TRANSCRIPTION ENCOUNTER (OUTPATIENT)
Age: 85
End: 2020-08-05

## 2020-08-05 DIAGNOSIS — S92.919A UNSPECIFIED FRACTURE OF UNSPECIFIED TOE(S), INITIAL ENCOUNTER FOR CLOSED FRACTURE: ICD-10-CM

## 2020-08-05 LAB
ALBUMIN SERPL ELPH-MCNC: 2.3 G/DL — LOW (ref 3.3–5)
ALP SERPL-CCNC: 63 U/L — SIGNIFICANT CHANGE UP (ref 40–120)
ALT FLD-CCNC: 26 U/L — SIGNIFICANT CHANGE UP (ref 12–78)
ANION GAP SERPL CALC-SCNC: 4 MMOL/L — LOW (ref 5–17)
AST SERPL-CCNC: 30 U/L — SIGNIFICANT CHANGE UP (ref 15–37)
BASOPHILS # BLD AUTO: 0.04 K/UL — SIGNIFICANT CHANGE UP (ref 0–0.2)
BASOPHILS NFR BLD AUTO: 0.5 % — SIGNIFICANT CHANGE UP (ref 0–2)
BILIRUB SERPL-MCNC: 0.5 MG/DL — SIGNIFICANT CHANGE UP (ref 0.2–1.2)
BUN SERPL-MCNC: 10 MG/DL — SIGNIFICANT CHANGE UP (ref 7–23)
CALCIUM SERPL-MCNC: 8.4 MG/DL — LOW (ref 8.5–10.1)
CHLORIDE SERPL-SCNC: 102 MMOL/L — SIGNIFICANT CHANGE UP (ref 96–108)
CO2 SERPL-SCNC: 32 MMOL/L — HIGH (ref 22–31)
CREAT SERPL-MCNC: 0.58 MG/DL — SIGNIFICANT CHANGE UP (ref 0.5–1.3)
EOSINOPHIL # BLD AUTO: 0.18 K/UL — SIGNIFICANT CHANGE UP (ref 0–0.5)
EOSINOPHIL NFR BLD AUTO: 2.1 % — SIGNIFICANT CHANGE UP (ref 0–6)
GLUCOSE SERPL-MCNC: 86 MG/DL — SIGNIFICANT CHANGE UP (ref 70–99)
HCT VFR BLD CALC: 34.1 % — LOW (ref 34.5–45)
HGB BLD-MCNC: 11.2 G/DL — LOW (ref 11.5–15.5)
IMM GRANULOCYTES NFR BLD AUTO: 0.4 % — SIGNIFICANT CHANGE UP (ref 0–1.5)
LYMPHOCYTES # BLD AUTO: 2.22 K/UL — SIGNIFICANT CHANGE UP (ref 1–3.3)
LYMPHOCYTES # BLD AUTO: 26.2 % — SIGNIFICANT CHANGE UP (ref 13–44)
MCHC RBC-ENTMCNC: 30.4 PG — SIGNIFICANT CHANGE UP (ref 27–34)
MCHC RBC-ENTMCNC: 32.8 GM/DL — SIGNIFICANT CHANGE UP (ref 32–36)
MCV RBC AUTO: 92.4 FL — SIGNIFICANT CHANGE UP (ref 80–100)
MONOCYTES # BLD AUTO: 0.96 K/UL — HIGH (ref 0–0.9)
MONOCYTES NFR BLD AUTO: 11.3 % — SIGNIFICANT CHANGE UP (ref 2–14)
NEUTROPHILS # BLD AUTO: 5.04 K/UL — SIGNIFICANT CHANGE UP (ref 1.8–7.4)
NEUTROPHILS NFR BLD AUTO: 59.5 % — SIGNIFICANT CHANGE UP (ref 43–77)
NRBC # BLD: 0 /100 WBCS — SIGNIFICANT CHANGE UP (ref 0–0)
PLATELET # BLD AUTO: 236 K/UL — SIGNIFICANT CHANGE UP (ref 150–400)
POTASSIUM SERPL-MCNC: 4.4 MMOL/L — SIGNIFICANT CHANGE UP (ref 3.5–5.3)
POTASSIUM SERPL-SCNC: 4.4 MMOL/L — SIGNIFICANT CHANGE UP (ref 3.5–5.3)
PROT SERPL-MCNC: 7 G/DL — SIGNIFICANT CHANGE UP (ref 6–8.3)
RBC # BLD: 3.69 M/UL — LOW (ref 3.8–5.2)
RBC # FLD: 13.6 % — SIGNIFICANT CHANGE UP (ref 10.3–14.5)
SODIUM SERPL-SCNC: 138 MMOL/L — SIGNIFICANT CHANGE UP (ref 135–145)
WBC # BLD: 8.47 K/UL — SIGNIFICANT CHANGE UP (ref 3.8–10.5)
WBC # FLD AUTO: 8.47 K/UL — SIGNIFICANT CHANGE UP (ref 3.8–10.5)

## 2020-08-05 PROCEDURE — 99232 SBSQ HOSP IP/OBS MODERATE 35: CPT

## 2020-08-05 PROCEDURE — 99233 SBSQ HOSP IP/OBS HIGH 50: CPT | Mod: GC

## 2020-08-05 RX ORDER — ENOXAPARIN SODIUM 100 MG/ML
40 INJECTION SUBCUTANEOUS
Qty: 0 | Refills: 0 | DISCHARGE
Start: 2020-08-05

## 2020-08-05 RX ADMIN — ESCITALOPRAM OXALATE 5 MILLIGRAM(S): 10 TABLET, FILM COATED ORAL at 11:47

## 2020-08-05 RX ADMIN — Medication 50 MILLIGRAM(S): at 05:15

## 2020-08-05 RX ADMIN — Medication 75 MICROGRAM(S): at 05:15

## 2020-08-05 RX ADMIN — ENOXAPARIN SODIUM 40 MILLIGRAM(S): 100 INJECTION SUBCUTANEOUS at 11:47

## 2020-08-05 NOTE — PROGRESS NOTE ADULT - PROBLEM SELECTOR PLAN 1
AMS and falls likely 2/2 worsening dementia  fall precautions  out of bed with assistance, ambulate with assistance   nutrition consult   PT consult appreciated, will likely need rehab placement   palliative team consult apprec recs  COVID 19 negative  neurology input appreciated    attempted multiple times to call daughter 8/5, however phone line was busy, no option to leave a voicemail AMS and falls likely 2/2 worsening dementia  fall precautions  out of bed with assistance, ambulate with assistance   nutrition consult   PT consult appreciated, will likely need rehab placement   palliative team consult apprec recs  COVID 19 negative  neurology input appreciated    attempted multiple times to call daughter 8/5, however phone line was busy, no option to leave a voicemail. Reached grand-daughter and updated family on plan of care encephalopathy/confusion and gait instability likely 2/2 worsening cognitive impairment  fall precautions  out of bed with assistance, ambulate with assistance   nutrition consult   PT consult appreciated, will likely need rehab placement   palliative team consult placed - DNR and DNI - MOLST in the chart  COVID 19 negative  neurology input appreciated    attempted multiple times to call daughter 8/5, however phone line was busy, no option to leave a voicemail. Reached grand-daughter and updated family on plan of care. they expressed agreement w/ the advised tx/dispo plan encephalopathy/confusion and gait instability likely 2/2 worsening cognitive impairment  fall precautions  out of bed with assistance, ambulate with assistance   nutrition consult   PT consult appreciated, will likely need rehab placement   palliative team consult placed - DNR and DNI - MOLST in the chart  COVID 19 negative  neurology input appreciated    attempted multiple times to call daughter 8/5, however phone line was busy, no option to leave a voicemail. Reached grand-daughter and updated family on plan of care. they expressed agreement w/ the advised tx/dispo plan Daughter phone number [phone]

## 2020-08-05 NOTE — PROGRESS NOTE ADULT - PROBLEM SELECTOR PLAN 2
falls likely 2/2 worsening dementia  plan as above falls likely 2/2 worsening cognitive impairment  plan as above

## 2020-08-05 NOTE — PROGRESS NOTE ADULT - PROBLEM SELECTOR PLAN 1
Patient examined and evaluated at this time.  Discussed etiology of symptoms with the patient at this time   No surgical intervention at this time, patient is not a candidate for surgical intervention for reduction of fractures to the left foot given the current medical condition and comorbidities of the patient   Continue with  conservative treatment of left foot digital fractures at this time  Patient to stay non weight bearing to the left lower extremity   -Patient is to follow up with Dr. Fiore/Dr. Sosa one week after discharge at [facility]

## 2020-08-05 NOTE — PROGRESS NOTE ADULT - SUBJECTIVE AND OBJECTIVE BOX
Neurology follow up note    JOESPH [id number]      Interval History:    Patient feels ok no new complaints.    MEDICATIONS    acetaminophen   Tablet .. 650 milliGRAM(s) Oral every 6 hours PRN  enoxaparin Injectable 40 milliGRAM(s) SubCutaneous daily  escitalopram 5 milliGRAM(s) Oral daily  levothyroxine 75 MICROGram(s) Oral daily  metoprolol succinate ER 50 milliGRAM(s) Oral daily  sodium chloride 0.9%. 1000 milliLiter(s) IV Continuous <Continuous>      Allergies    latex (Rash)  penicillin (Unknown)  sulfa drugs (Unknown)    Intolerances            Vital Signs Last 24 Hrs  T(C): 37.1 (05 Aug 2020 04:30), Max: 37.1 (04 Aug 2020 14:33)  T(F): 98.7 (05 Aug 2020 04:30), Max: 98.7 (04 Aug 2020 14:33)  HR: 63 (05 Aug 2020 04:30) (63 - 70)  BP: 142/65 (05 Aug 2020 04:30) (121/58 - 142/65)  BP(mean): --  RR: 17 (05 Aug 2020 04:30) (14 - 17)  SpO2: 90% (05 Aug 2020 04:30) (90% - 96%)      REVIEW OF SYSTEMS:  Constitutional:  The patient denies fever, chills, or night sweats.  Head:  No headache.  Eyes:  No double vision or blurry vision.  Ears:  No ringing in her ears.  Neck:  No neck pain.  Respiratory:  No shortness of breath.  Cardiovascular:  No chest pain.  Abdomen:  No nausea, vomiting, or abdominal pain.  Extremities/Neurologic:  No numbness or tingling.  Musculoskeletal:  Positive history of joint pain and also left foot pain.    PHYSICAL EXAMINATION:  HEENT:  Head:  Normocephalic, atraumatic.  Eyes:  No scleral icterus.  Ears:  Hearing bilaterally intact.  NECK:  Supple.  RESPIRATORY:  Good air entry bilaterally.  CARDIOVASCULAR:  S1 and S2 heard.  ABDOMEN:  Soft and nontender.  EXTREMITIES:  No clubbing or cyanosis were noted.      NEUROLOGIC:  The patient is awake and alert.   Extraocular movements were intact.  Pupils were equal, round, and reactive bilaterally 3 mm to 2 mm.  Speech was fluent.  Smile was symmetric.  Motor:  Bilateral upper were 4/5, bilateral lower were 4-/5, has decreased range of motion of the left foot secondary to fractures.            LABS:  CBC Full  -  ( 05 Aug 2020 08:05 )  WBC Count : 8.47 K/uL  RBC Count : 3.69 M/uL  Hemoglobin : 11.2 g/dL  Hematocrit : 34.1 %  Platelet Count - Automated : 236 K/uL  Mean Cell Volume : 92.4 fl  Mean Cell Hemoglobin : 30.4 pg  Mean Cell Hemoglobin Concentration : 32.8 gm/dL  Auto Neutrophil # : 5.04 K/uL  Auto Lymphocyte # : 2.22 K/uL  Auto Monocyte # : 0.96 K/uL  Auto Eosinophil # : 0.18 K/uL  Auto Basophil # : 0.04 K/uL  Auto Neutrophil % : 59.5 %  Auto Lymphocyte % : 26.2 %  Auto Monocyte % : 11.3 %  Auto Eosinophil % : 2.1 %  Auto Basophil % : 0.5 %    Urinalysis Basic - ( 03 Aug 2020 14:10 )    Color: Yellow / Appearance: Clear / S.005 / pH: x  Gluc: x / Ketone: Negative  / Bili: Negative / Urobili: Negative   Blood: x / Protein: Negative / Nitrite: Negative   Leuk Esterase: Trace / RBC: 0-2 /HPF / WBC 3-5   Sq Epi: x / Non Sq Epi: Occasional / Bacteria: Few          138  |  102  |  10  ----------------------------<  86  4.4   |  32<H>  |  0.58    Ca    8.4<L>      05 Aug 2020 08:05    TPro  7.0  /  Alb  2.3<L>  /  TBili  0.5  /  DBili  x   /  AST  30  /  ALT  26  /  AlkPhos  63  08-05    Hemoglobin A1C:     LIVER FUNCTIONS - ( 05 Aug 2020 08:05 )  Alb: 2.3 g/dL / Pro: 7.0 g/dL / ALK PHOS: 63 U/L / ALT: 26 U/L / AST: 30 U/L / GGT: x           Vitamin B12 Vitamin B12, Serum: 532 pg/mL ( @ 10:56)    PT/INR - ( 03 Aug 2020 13:04 )   PT: 13.2 sec;   INR: 1.14 ratio         PTT - ( 03 Aug 2020 13:04 )  PTT:26.2 sec      RADIOLOGY        ANALYSIS AND PLAN:  An 88-year-old with an episode of altered mental status and history of falls.  1.	For altered mental status, suspect this could be secondary to underlying mild cognitive impairment versus subtle dementia, becoming more prominent in the hospital setting.   2.	For mild cognitive impairment versus subtle  3.	For history of falls, possibly secondary to age-related changes, would recommend physical therapy, less likely this is from a cerebrovascular accident that has been going on for the last few months.  4.	For history of hypothyroidism, continue the patient on Synthroid.  5.	For history of hypertension, monitor systolic blood pressure.  6.	Fall precautions, safety concerns were provided to the family.  7.	DaughterAmina's telephone number is [phone].  Her cell number is [phone]. 2020  8.	Greater than 45 minutes of time was spent with the patient, plan of care, reviewing data, speaking to the family and multidisciplinary healthcare team.

## 2020-08-05 NOTE — DISCHARGE NOTE PROVIDER - PROVIDER TOKENS
PROVIDER:[TOKEN:[[id number]],FOLLOWUP:[1 week],ESTABLISHEDPATIENT:[T]],PROVIDER:[TOKEN:[[id number]],FOLLOWUP:[1 week],ESTABLISHEDPATIENT:[T]]

## 2020-08-05 NOTE — DIETITIAN INITIAL EVALUATION ADULT. - OTHER INFO
88 year old female with history of autoimmune hepatitis, hypothyroid, HTN, mild dementia (but never officially diagnosed as per daughter) presents with generalized weakness with falls, confusion, and left foot pain, admit for falls and AMS likely 2/2 worsening dementia, will likely need rehab placement.  Found to have fx L foot 3rd, 4th & 5th digits.  Pt confused upon interview, stating she is 12" tall and has a BM once a month.  Per RN, pt told her she was 38 years old.  Unable to obtain any viable hx from pt.  Pt is DNR/DNI, no tubefeeds.

## 2020-08-05 NOTE — PROGRESS NOTE ADULT - SUBJECTIVE AND OBJECTIVE BOX
Patient is an 88y year old Female seen at bed side for left foot digital fractures. Patient states she has been feeling better today and was eating her breakfast in bed. Patient relates  pain in her left foot and states the pain is less than yesterday. Denies any fever, chills, nausea, vomiting, chest pain, shortness of breath, or calf pain at this time.    REVIEW OF SYSTEMS    PAST MEDICAL & SURGICAL HISTORY:  Hepatitis: autoimmune hepatitis 20 years ago  Hypothyroid  Essential hypertension  H/O abdominal hysterectomy  History of cholecystectomy  S/P appy      Allergies    latex (Rash)  penicillin (Unknown)  sulfa drugs (Unknown)    Intolerances    MEDICATIONS  (STANDING):  enoxaparin Injectable 40 milliGRAM(s) SubCutaneous daily  escitalopram 5 milliGRAM(s) Oral daily  levothyroxine 75 MICROGram(s) Oral daily  metoprolol succinate ER 50 milliGRAM(s) Oral daily  sodium chloride 0.9%. 1000 milliLiter(s) (50 mL/Hr) IV Continuous <Continuous>    MEDICATIONS  (PRN):  acetaminophen   Tablet .. 650 milliGRAM(s) Oral every 6 hours PRN Mild Pain (1 - 3)      Social History:  denies ever smoking ever using etoh or ever using other illicit drugs   lives at home with daughter  uses a walker   needs help with ADLS such as cooking and changing her clothes   code status: pt unsure-assume Full Code for now (03 Aug 2020 16:02)      FAMILY HISTORY:  Family history of stroke  Family history of heart disease      Vital Signs Last 24 Hrs  T(C): 36.8 (03 Aug 2020 19:42), Max: 36.8 (03 Aug 2020 17:42)  T(F): 98.3 (03 Aug 2020 19:42), Max: 98.3 (03 Aug 2020 19:42)  HR: 73 (03 Aug 2020 19:42) (68 - 73)  BP: 149/68 (03 Aug 2020 19:42) (127/64 - 149/68)  BP(mean): --  RR: 18 (03 Aug 2020 19:42) (18 - 18)  SpO2: 99% (03 Aug 2020 19:42) (97% - 99%)    PHYSICAL EXAM:  Vascular: DP & PT palpable bilaterally, Capillary refill 3 seconds, Digital hair absent bilaterally   Neurological: Unable to assess. Patient unable to follow instructions at this time   Musculoskeletal: Deferred to the left secondary to digital fractures and patient unable to follow instructions at this time   Dermatological: Skin of bilateral lower extremity is warm to touch. No open wounds noted to the foot b/l. No erythema noted to the dorsum of the left foot       CBC Full  -  ( 05 Aug 2020 08:05 )  WBC Count : 8.47 K/uL  RBC Count : 3.69 M/uL  Hemoglobin : 11.2 g/dL  Hematocrit : 34.1 %  Platelet Count - Automated : 236 K/uL  Mean Cell Volume : 92.4 fl  Mean Cell Hemoglobin : 30.4 pg  Mean Cell Hemoglobin Concentration : 32.8 gm/dL  Auto Neutrophil # : 5.04 K/uL  Auto Lymphocyte # : 2.22 K/uL  Auto Monocyte # : 0.96 K/uL  Auto Eosinophil # : 0.18 K/uL  Auto Basophil # : 0.04 K/uL  Auto Neutrophil % : 59.5 %  Auto Lymphocyte % : 26.2 %  Auto Monocyte % : 11.3 %  Auto Eosinophil % : 2.1 %  Auto Basophil % : 0.5 %    08-05    138  |  102  |  10  ----------------------------<  86  4.4   |  32<H>  |  0.58    Ca    8.4<L>      05 Aug 2020 08:05    TPro  7.0  /  Alb  2.3<L>  /  TBili  0.5  /  DBili  x   /  AST  30  /  ALT  26  /  AlkPhos  63  08-05          Imaging:  < from: Xray Foot AP + Lateral + Oblique, Left (08.03.20 @ 14:06) >  PROCEDURE DATE:08/03/2020          INTERPRETATION:  Left hip with full pelvic view, left knee, left ankle, and left foot. Patient had a fall with local trauma.    Left hip and pelvis. 3 views.    Hips are relatively free of degeneration and are symmetric.    There is a mild left lower lumbar curve.    No bone destruction or fracture is evident.    Left knee. 3 views.    Some arterial calcifications are noted.    There is no effusion.    There are moderate degenerative changes most pronounced in the lateral compartment with loss of joint space and outer spurring.    Left ankle. 4 views.    The ankle is relatively free of degeneration. No bone destruction or fracture is evident.    Left foot. 3 views. There is a fracture of the distal aspect of the proximal phalanx of the great toe. There also fractures of the proximal shafts of the proximal phalanges of toes 3, 4, and 5.    IMPRESSION: Multiple toe fractures as above.    < end of copied text >

## 2020-08-05 NOTE — DIETITIAN INITIAL EVALUATION ADULT. - PROBLEM SELECTOR PLAN 3
generalized weakness 2/2 decreased PO intake in setting of dementia, pt never officially diagnosed with dementia   NS at rate 50 ml/hr for 12 hours  DASH/TLC diet  nutrition consult

## 2020-08-05 NOTE — DISCHARGE NOTE PROVIDER - NSDCCPCAREPLAN_GEN_ALL_CORE_FT
PRINCIPAL DISCHARGE DIAGNOSIS  Diagnosis: Altered mental status  Assessment and Plan of Treatment: Suspect this is due to progression of cognitive impairment. Evaluated by neurology while inpatient. Recommend balanced diet, active rehab participation, regular sleep pattern and continue with supportive care.      SECONDARY DISCHARGE DIAGNOSES  Diagnosis: Toe fracture, left  Assessment and Plan of Treatment: Multiple fractures to L foot. Recommend nonweight bearing to L foot for now. Follow up with podiatry approximately 1 week after discharge.    Diagnosis: Fall, initial encounter  Assessment and Plan of Treatment: Recommend participation with physical therapy.    Diagnosis: Weakness  Assessment and Plan of Treatment: Recommend balanced diet, active rehab participation, regular sleep pattern and continue with supportive care. PRINCIPAL DISCHARGE DIAGNOSIS  Diagnosis: Altered mental status  Assessment and Plan of Treatment: Suspect this is due to progression of cognitive impairment. Evaluated by neurology while inpatient. Recommend balanced diet, active rehab participation, regular sleep pattern and continue with supportive care. You were recommedned to be discharged to rehab but a family deision was made to return home with outpatient physical therapy      SECONDARY DISCHARGE DIAGNOSES  Diagnosis: Toe fracture, left  Assessment and Plan of Treatment: Multiple fractures to L foot. Recommend nonweight bearing to L foot for now. Use surgical shoe as directed. Follow up with podiatry approximately 1 week after discharge.    Diagnosis: Fall, initial encounter  Assessment and Plan of Treatment: Recommend participation with physical therapy.    Diagnosis: Weakness  Assessment and Plan of Treatment: Recommend balanced diet, physical therapy, regular sleep pattern and continue with supportive care.

## 2020-08-05 NOTE — PROGRESS NOTE ADULT - SUBJECTIVE AND OBJECTIVE BOX
Patient is a 88y old  Female who presents with a chief complaint of weakness, confusion, left foot pain (05 Aug 2020 09:55)      FROM ADMISSION H+P:   HPI:  88 year old female with history of autoimmune hepatitis, hypothyroid, HTN, mild dementia (but never officially diagnosed as per daughter) presents with generalized weakness with falls, confusion, and left foot pain. Hx taken from charts and patient, (daughter not answering phone when attempted to reach, no option for voicemail). Pt states she slid in the bathroom yesterday and hit her left foot and head. Pt denies lightheadedness, dizziness, palpitations, or chest pain prior to the fall. Pt also states she has decreased PO intake due to no appetite x ~3 days. Pt states prior to this she had a fall 2 weeks ago, last known prior fall was over twenty years ago. Pt denies fever, chills, CP, cough, SOB, abdominal pain, constipation, diarrhea, edema. As per chart review daughter stated pt has been needing to use a walker due to increased weakness for approx 1 week. Daughter had found patient after fall 2 weeks ago, hit the back of her head, no LOC, was not using a walker at that time. Pt fell again 8 hours later in the restroom, was using a walker at that time, walker was on top of her, and daughter noted pt's left foot appeared swollen and painful and pt had vomited x 1. Patient also appeared confused yesterday, "mumbling" her words, told her daughter her "head felt full". Daughter states pt appeared to improve after a milkshake, may have been dehydrated, has had a similar symptoms in the past but to a lesser severity. Pt's grandson was attempting to help pt into the car for her doctor appointment, however was was too weak to get into the car, EMS was called at that time. As per daughter pt has been having yellowish diarrhea x couple of days.     ED vitals afebrile, HR 68, /64, 99% on room air, RR 18. Hgb 11.4, last known prior 13.1, albumin 2.5, AST 42, UA negative. Pt given .5 L IVF bolus. CT head shows  chronic ischemic changes in both hemispheres with volume loss, no acute abnormality ie no contusion or hemorrhage CT C spine no acute fracture identified. Underlying severe scoliosis and chronic degenerative changes. EKG 65 bpm NSR no ischemic changes noted. (03 Aug 2020 16:02)      ----  INTERVAL HPI/OVERNIGHT EVENTS: Pt seen and evaluated at the bedside. No acute overnight events occurred. Patient reports feeling well this morning, seen eating breakfast. Forgetful at times. Awaiting placement in rehab.    ----  PAST MEDICAL & SURGICAL HISTORY:  Hepatitis: autoimmune hepatitis 20 years ago  Hypothyroid  Essential hypertension  H/O abdominal hysterectomy  History of cholecystectomy  S/P appy      FAMILY HISTORY:  Family history of stroke  Family history of heart disease      ----  MEDICATIONS  (STANDING):  enoxaparin Injectable 40 milliGRAM(s) SubCutaneous daily  escitalopram 5 milliGRAM(s) Oral daily  levothyroxine 75 MICROGram(s) Oral daily  metoprolol succinate ER 50 milliGRAM(s) Oral daily    MEDICATIONS  (PRN):  acetaminophen   Tablet .. 650 milliGRAM(s) Oral every 6 hours PRN Mild Pain (1 - 3)      ----  REVIEW OF SYSTEMS:  CONSTITUTIONAL: No fever, weight loss, or fatigue  RESPIRATORY: No cough, wheezing, chills or hemoptysis; No shortness of breath  CARDIOVASCULAR: No chest pain, palpitations, dizziness, or leg swelling  GASTROINTESTINAL: No abdominal or epigastric pain. No nausea, vomiting  GENITOURINARY: No dysuria, frequency, hematuria, or incontinence  NEUROLOGICAL: No headaches, no loss of strength, numbness, or tremors  SKIN: No itching, burning, rashes, or lesions   MUSCULOSKELETAL: No joint pain or swelling; No muscle, back, or extremity pain      PHYSICAL EXAM:  GENERAL: NAD, well-groomed, elderly   NERVOUS SYSTEM:  Alert, good concentration; Motor Strength 3/5 B/L upper and lower extremities  CHEST/LUNG: Clear to percussion bilaterally; No rales, rhonchi, wheezing, or rubs  HEART: Regular rate and rhythm; No murmurs, rubs, or gallops  ABDOMEN: Soft, Nontender, Nondistended; Bowel sounds present  EXTREMITIES:  2+ Peripheral Pulses, No clubbing, cyanosis, or edema  SKIN: No rashes or lesions      T(C): 37.1 (20 @ 04:30), Max: 37.1 (20 @ 14:33)  HR: 63 (20 @ 04:30) (63 - 70)  BP: 142/65 (20 @ 04:30) (122/62 - 142/65)  RR: 17 (20 @ 04:30) (16 - 17)  SpO2: 90% (20 @ 04:30) (90% - 95%)  Wt(kg): --    ----  I&O's Summary    04 Aug 2020 07:01  -  05 Aug 2020 07:00  --------------------------------------------------------  IN: 240 mL / OUT: 0 mL / NET: 240 mL        LABS:                        11.2   8.47  )-----------( 236      ( 05 Aug 2020 08:05 )             34.1     08-    138  |  102  |  10  ----------------------------<  86  4.4   |  32<H>  |  0.58    Ca    8.4<L>      05 Aug 2020 08:05    TPro  7.0  /  Alb  2.3<L>  /  TBili  0.5  /  DBili  x   /  AST  30  /  ALT  26  /  AlkPhos  63  08-05    PT/INR - ( 03 Aug 2020 13:04 )   PT: 13.2 sec;   INR: 1.14 ratio         PTT - ( 03 Aug 2020 13:04 )  PTT:26.2 sec  Urinalysis Basic - ( 03 Aug 2020 14:10 )    Color: Yellow / Appearance: Clear / S.005 / pH: x  Gluc: x / Ketone: Negative  / Bili: Negative / Urobili: Negative   Blood: x / Protein: Negative / Nitrite: Negative   Leuk Esterase: Trace / RBC: 0-2 /HPF / WBC 3-5   Sq Epi: x / Non Sq Epi: Occasional / Bacteria: Few      CAPILLARY BLOOD GLUCOSE           @ 18:55   >=3 organisms. Probable collection contamination.  --  --            ----  Personally reviewed:  Vital sign trends: [ X ] yes    [  ] no     [  ] n/a  Laboratory results: [X  ] yes    [  ] no     [  ] n/a  Radiology results: [X  ] yes    [  ] no     [  ] n/a  Consultant recommendations: [X  ] yes    [  ] no     [  ] n/a Patient is a 88y old  Female who presents with a chief complaint of weakness, confusion, left foot pain (05 Aug 2020 09:55)      FROM ADMISSION H+P:   HPI:  88 year old female with history of autoimmune hepatitis, hypothyroid, HTN, mild dementia (but never officially diagnosed as per daughter) presents with generalized weakness with falls, confusion, and left foot pain. Hx taken from charts and patient, (daughter not answering phone when attempted to reach, no option for voicemail). Pt states she slid in the bathroom yesterday and hit her left foot and head. Pt denies lightheadedness, dizziness, palpitations, or chest pain prior to the fall. Pt also states she has decreased PO intake due to no appetite x ~3 days. Pt states prior to this she had a fall 2 weeks ago, last known prior fall was over twenty years ago. Pt denies fever, chills, CP, cough, SOB, abdominal pain, constipation, diarrhea, edema. As per chart review daughter stated pt has been needing to use a walker due to increased weakness for approx 1 week. Daughter had found patient after fall 2 weeks ago, hit the back of her head, no LOC, was not using a walker at that time. Pt fell again 8 hours later in the restroom, was using a walker at that time, walker was on top of her, and daughter noted pt's left foot appeared swollen and painful and pt had vomited x 1. Patient also appeared confused yesterday, "mumbling" her words, told her daughter her "head felt full". Daughter states pt appeared to improve after a milkshake, may have been dehydrated, has had a similar symptoms in the past but to a lesser severity. Pt's grandson was attempting to help pt into the car for her doctor appointment, however was was too weak to get into the car, EMS was called at that time. As per daughter pt has been having yellowish diarrhea x couple of days.     ----  INTERVAL HPI/OVERNIGHT EVENTS: Pt seen and evaluated at the bedside. No acute overnight events occurred. Patient reports feeling well this morning, seen eating breakfast. Forgetful at times. Awaiting placement in rehab. Verbalized no complaints. She expressed concern about her toes but was agreeable w/ tx plan and expressed appreciation for explanation of tx plan and is agreeable. Does not know why she is in the hospital, confused to time. Functionally oriented only to self and asked that her daughter be contacted to answer "all of these questions" (even the yes/no subjective questions asked to her)    ----  PAST MEDICAL & SURGICAL HISTORY:  Hepatitis: autoimmune hepatitis 20 years ago  Hypothyroid  Essential hypertension  H/O abdominal hysterectomy  History of cholecystectomy  S/P appy      FAMILY HISTORY:  Family history of stroke  Family history of heart disease      ----  MEDICATIONS  (STANDING):  enoxaparin Injectable 40 milliGRAM(s) SubCutaneous daily  escitalopram 5 milliGRAM(s) Oral daily  levothyroxine 75 MICROGram(s) Oral daily  metoprolol succinate ER 50 milliGRAM(s) Oral daily    MEDICATIONS  (PRN):  acetaminophen   Tablet .. 650 milliGRAM(s) Oral every 6 hours PRN Mild Pain (1 - 3)      ----  REVIEW OF SYSTEMS: - ROS are not reliable given chronic cognitive impairment but the following were obtained  CONSTITUTIONAL: No fever, fatigue  RESPIRATORY: No cough, wheezing, chills or hemoptysis; No shortness of breath  CARDIOVASCULAR: No chest pain, palpitations, dizziness, or leg swelling  GASTROINTESTINAL: No abdominal or epigastric pain. No nausea, vomiting  NEUROLOGICAL: No headaches, no loss of strength, numbness, or tremors  MUSCULOSKELETAL: No joint pain or swelling; No muscle, back, or extremity pain      PHYSICAL EXAM:  GENERAL: NAD, well-groomed, appears stated age, nontoxic  NERVOUS SYSTEM:  Alert; Motor Strength 3/5 B/L upper and lower extremities, oriented to person only  CHEST/LUNG: Clear to auscultation bilaterally; No rales, rhonchi, wheezing, or rubs  HEART: Regular rate and rhythm; No murmurs, rubs, or gallops  ABDOMEN: Soft, Nontender, Nondistended; Bowel sounds present  EXTREMITIES:  2+ Peripheral Pulses, No clubbing, cyanosis  SKIN: mild pallor noted, cap refill <2s in b/l hands      T(C): 37.1 (20 @ 04:30), Max: 37.1 (20 @ 14:33)  HR: 63 (20 @ 04:30) (63 - 70)  BP: 142/65 (20 @ 04:30) (122/62 - 142/65)  RR: 17 (20 @ 04:30) (16 - 17)  SpO2: 90% (20 @ 04:30) (90% - 95%)  Wt(kg): --    ----  I&O's Summary    04 Aug 2020 07:01  -  05 Aug 2020 07:00  --------------------------------------------------------  IN: 240 mL / OUT: 0 mL / NET: 240 mL        LABS:                        11.2   8.47  )-----------( 236      ( 05 Aug 2020 08:05 )             34.1     0805    138  |  102  |  10  ----------------------------<  86  4.4   |  32<H>  |  0.58    Ca    8.4<L>      05 Aug 2020 08:05    TPro  7.0  /  Alb  2.3<L>  /  TBili  0.5  /  DBili  x   /  AST  30  /  ALT  26  /  AlkPhos  63  08-05    PT/INR - ( 03 Aug 2020 13:04 )   PT: 13.2 sec;   INR: 1.14 ratio         PTT - ( 03 Aug 2020 13:04 )  PTT:26.2 sec  Urinalysis Basic - ( 03 Aug 2020 14:10 )    Color: Yellow / Appearance: Clear / S.005 / pH: x  Gluc: x / Ketone: Negative  / Bili: Negative / Urobili: Negative   Blood: x / Protein: Negative / Nitrite: Negative   Leuk Esterase: Trace / RBC: 0-2 /HPF / WBC 3-5   Sq Epi: x / Non Sq Epi: Occasional / Bacteria: Few      CAPILLARY BLOOD GLUCOSE           @ 18:55   >=3 organisms. Probable collection contamination.  --  --            ----  Personally reviewed:  Vital sign trends: [ X ] yes    [  ] no     [  ] n/a  Laboratory results: [X  ] yes    [  ] no     [  ] n/a  Radiology results: [X  ] yes    [  ] no     [  ] n/a  Consultant recommendations: [X  ] yes    [  ] no     [  ] n/a

## 2020-08-05 NOTE — DIETITIAN INITIAL EVALUATION ADULT. - PROBLEM SELECTOR PLAN 1
AMS and falls likely 2/2 worsening dementia  s/p .5 L NS bolus  fall precautions  out of bed with assistance, ambulate with assistance   nutrition consult   PT consult, will likely need rehab placement   GOC consult for discussion on code status, pt unsure   COVID 19 negative  consult neurology Dr Weber   attempted multiple times to call daughter, however phone line was busy, no option to leave a voicemail

## 2020-08-05 NOTE — PROGRESS NOTE ADULT - ASSESSMENT
88 year old female with history of autoimmune hepatitis, hypothyroid, HTN, mild dementia (but never officially diagnosed as per daughter) presents with generalized weakness with falls, confusion, and left foot pain, admit for falls and AMS likely 2/2 worsening dementia, will likely need rehab placement. 88 year old female with history of autoimmune hepatitis, hypothyroid, HTN, mild dementia (but never officially diagnosed as per daughter) presents with generalized weakness with falls, confusion, and left foot pain, admit for falls and AMS likely 2/2 worsening cognitive impairment, will likely need rehab placement.

## 2020-08-05 NOTE — DISCHARGE NOTE PROVIDER - NSDCFUADDINST_GEN_ALL_CORE_FT
Non weight bearing to LLE per podiatry recommendations.   Recommend call to schedule your follow up appointments with your doctors (primary care doctor, podiatrist)  Recommend that you take your medications as detailed in your medication reconciliation  Recommend return to the ED for worsening of your medical condition Non weight bearing to LLE per podiatry recommendations.  Recommend call to schedule your follow up appointments with your doctors (primary care doctor, podiatrist)  Recommend that you take your medications as detailed in your medication reconciliation  Recommend return to the ED for worsening of your medical condition

## 2020-08-05 NOTE — DISCHARGE NOTE PROVIDER - CARE PROVIDER_API CALL
Tiburcio Godinez  INFECTIOUS DISEASE  [address], [place] [id number]  Phone: [phone]  Fax: [phone]  Established Patient  Follow Up Time: 1 week    Chevy Sosa (DP)  [facility]  [address] [place] [address]  Phone: [phone]  Fax: [phone]  Established Patient  Follow Up Time: 1 week

## 2020-08-05 NOTE — DISCHARGE NOTE PROVIDER - NSDCMRMEDTOKEN_GEN_ALL_CORE_FT
Acidophilus oral capsule: 1 cap(s) orally once a day  aspirin 81 mg oral tablet: 1 tab(s) orally once a day  enoxaparin: 40 milligram(s) subcutaneous once a day  Lexapro 5 mg oral tablet: 1 tab(s) orally once a day  metoprolol succinate 50 mg oral tablet, extended release: 1 tab(s) orally once a day  Synthroid 75 mcg (0.075 mg) oral tablet: 1 tab(s) orally once a day Acidophilus oral capsule: 1 cap(s) orally once a day  aspirin 81 mg oral tablet: 1 tab(s) orally once a day  Lexapro 5 mg oral tablet: 1 tab(s) orally once a day  metoprolol succinate 50 mg oral tablet, extended release: 1 tab(s) orally once a day  Synthroid 75 mcg (0.075 mg) oral tablet: 1 tab(s) orally once a day

## 2020-08-05 NOTE — DISCHARGE NOTE PROVIDER - HOSPITAL COURSE
FROM ADMISSION H+P:     HPI:    88 year old female with history of autoimmune hepatitis, hypothyroid, HTN, mild dementia (but never officially diagnosed as per daughter) presents with generalized weakness with falls, confusion, and left foot pain. Hx taken from charts and patient, (daughter not answering phone when attempted to reach, no option for voicemail). Pt states she slid in the bathroom yesterday and hit her left foot and head. Pt denies lightheadedness, dizziness, palpitations, or chest pain prior to the fall. Pt also states she has decreased PO intake due to no appetite x ~3 days. Pt states prior to this she had a fall 2 weeks ago, last known prior fall was over twenty years ago. Pt denies fever, chills, CP, cough, SOB, abdominal pain, constipation, diarrhea, edema. As per chart review daughter stated pt has been needing to use a walker due to increased weakness for approx 1 week. Daughter had found patient after fall 2 weeks ago, hit the back of her head, no LOC, was not using a walker at that time. Pt fell again 8 hours later in the restroom, was using a walker at that time, walker was on top of her, and daughter noted pt's left foot appeared swollen and painful and pt had vomited x 1. Patient also appeared confused yesterday, "mumbling" her words, told her daughter her "head felt full". Daughter states pt appeared to improve after a milkshake, may have been dehydrated, has had a similar symptoms in the past but to a lesser severity. Pt's grandson was attempting to help pt into the car for her doctor appointment, however was was too weak to get into the car, EMS was called at that time. As per daughter pt has been having yellowish diarrhea x couple of days.         ED vitals afebrile, HR 68, /64, 99% on room air, RR 18. Hgb 11.4, last known prior 13.1, albumin 2.5, AST 42, UA negative. Pt given .5 L IVF bolus. CT head shows  chronic ischemic changes in both hemispheres with volume loss, no acute abnormality ie no contusion or hemorrhage CT C spine no acute fracture identified. Underlying severe scoliosis and chronic degenerative changes. EKG 65 bpm NSR no ischemic changes noted. (03 Aug 2020 16:02)            ---    HOSPITAL COURSE: Patient was admitted to medicine. Seen by podiatry ( Dr. Sosa) No surgical intervention at this time, patient is not a candidate for surgical intervention for reduction of fractures to the left foot given the current medical condition and comorbidities.  For AMS, patient was seen by neurology (Dr. Weber). Her symptoms are likely due to underlying mild cognitive impairment versus subtle dementia. Patient was evaluated by PT and recommended to be discharged to [facility]. Patient showed gradual improvement over hospitalization and is optimized for discharge with close outpatient PMD monitoring and follow up.            ---            FINAL DISCHARGE DIAGNOSIS LIST:    Please see last daily progress note for final discharge diagnoses        --- FROM ADMISSION H+P:     HPI:    88 year old female with history of autoimmune hepatitis, hypothyroid, HTN, mild dementia (but never officially diagnosed as per daughter) presents with generalized weakness with falls, confusion, and left foot pain. Hx taken from charts and patient, (daughter not answering phone when attempted to reach, no option for voicemail). Pt states she slid in the bathroom yesterday and hit her left foot and head. Pt denies lightheadedness, dizziness, palpitations, or chest pain prior to the fall. Pt also states she has decreased PO intake due to no appetite x ~3 days. Pt states prior to this she had a fall 2 weeks ago, last known prior fall was over twenty years ago. Pt denies fever, chills, CP, cough, SOB, abdominal pain, constipation, diarrhea, edema. As per chart review daughter stated pt has been needing to use a walker due to increased weakness for approx 1 week. Daughter had found patient after fall 2 weeks ago, hit the back of her head, no LOC, was not using a walker at that time. Pt fell again 8 hours later in the restroom, was using a walker at that time, walker was on top of her, and daughter noted pt's left foot appeared swollen and painful and pt had vomited x 1. Patient also appeared confused yesterday, "mumbling" her words, told her daughter her "head felt full". Daughter states pt appeared to improve after a milkshake, may have been dehydrated, has had a similar symptoms in the past but to a lesser severity. Pt's grandson was attempting to help pt into the car for her doctor appointment, however was was too weak to get into the car, EMS was called at that time. As per daughter pt has been having yellowish diarrhea x couple of days.         ---    HOSPITAL COURSE:     Patient was admitted to medicine service. For foot pain and fx's noted on imaging, the pt was seen by podiatry (Dr. Sosa) and no surgical intervention at this time. Pt deemed not a surgical candidate for reduction of fractures to the left foot given the current medical condition and comorbidities. For AMS, patient was seen by neurology (Dr. Weber). Her symptoms are likely due to underlying mild cognitive impairment versus subtle dementia. Patient was evaluated by PT and recommended dispo to [facility]. Patient showed gradual improvement over hospitalization and is optimized for discharge with close outpatient PMD monitoring and follow up.        ---    I, the attending physician, was physically present for the key portions of the evaluation and management (E/M) service provided.  I agree with the above findings which I have reviewed and edited where appropriate.  The total amount of time spent preparing the discharge and follow up plan was -- minutes.         ---    The patient was assessed on the day of discharge:      Vital Signs Last 24 Hrs    T(C): 36.8 (06 Aug 2020 04:30), Max: 36.8 (05 Aug 2020 14:09)    T(F): 98.3 (06 Aug 2020 04:30), Max: 98.3 (06 Aug 2020 04:30)    HR: 62 (06 Aug 2020 04:30) (62 - 99)    BP: 146/68 (06 Aug 2020 04:30) (120/73 - 146/68)    BP(mean): --    RR: 17 (06 Aug 2020 04:30) (16 - 17)    SpO2: 93% (06 Aug 2020 04:30) (91% - 93%)        PE: FROM ADMISSION H+P:     HPI:    88 year old female with history of autoimmune hepatitis, hypothyroid, HTN, mild dementia (but never officially diagnosed as per daughter) presents with generalized weakness with falls, confusion, and left foot pain. Hx taken from charts and patient, (daughter not answering phone when attempted to reach, no option for voicemail). Pt states she slid in the bathroom yesterday and hit her left foot and head. Pt denies lightheadedness, dizziness, palpitations, or chest pain prior to the fall. Pt also states she has decreased PO intake due to no appetite x ~3 days. Pt states prior to this she had a fall 2 weeks ago, last known prior fall was over twenty years ago. Pt denies fever, chills, CP, cough, SOB, abdominal pain, constipation, diarrhea, edema. As per chart review daughter stated pt has been needing to use a walker due to increased weakness for approx 1 week. Daughter had found patient after fall 2 weeks ago, hit the back of her head, no LOC, was not using a walker at that time. Pt fell again 8 hours later in the restroom, was using a walker at that time, walker was on top of her, and daughter noted pt's left foot appeared swollen and painful and pt had vomited x 1. Patient also appeared confused yesterday, "mumbling" her words, told her daughter her "head felt full". Daughter states pt appeared to improve after a milkshake, may have been dehydrated, has had a similar symptoms in the past but to a lesser severity. Pt's grandson was attempting to help pt into the car for her doctor appointment, however was was too weak to get into the car, EMS was called at that time. As per daughter pt has been having yellowish diarrhea x couple of days.         ---    HOSPITAL COURSE:     Patient was admitted to medicine service. For foot pain and fx's noted on imaging, the pt was seen by podiatry (Dr. Sosa) and no surgical intervention at this time. Pt deemed not a surgical candidate for reduction of fractures to the left foot given the current medical condition and comorbidities. Patient to stay non weight bearing to the left lower extremity. She is to follow up with Dr. Fiore/Dr. Sosa one week after discharge at [facility]. For AMS, patient was seen by neurology (Dr. Weber). Her symptoms are likely due to underlying mild cognitive impairment versus subtle dementia. Patient was evaluated by PT and recommended dispo to [facility]. Patient showed gradual improvement over hospitalization and is optimized for discharge with close outpatient PMD monitoring and follow up.        ---    I, the attending physician, was physically present for the key portions of the evaluation and management (E/M) service provided.  I agree with the above findings which I have reviewed and edited where appropriate.  The total amount of time spent preparing the discharge and follow up plan was -- minutes.         --- FROM ADMISSION H+P:     HPI:    88 year old female with history of autoimmune hepatitis, hypothyroid, HTN, mild dementia (but never officially diagnosed as per daughter) presents with generalized weakness with falls, confusion, and left foot pain. Hx taken from charts and patient, (daughter not answering phone when attempted to reach, no option for voicemail). Pt states she slid in the bathroom yesterday and hit her left foot and head. Pt denies lightheadedness, dizziness, palpitations, or chest pain prior to the fall. Pt also states she has decreased PO intake due to no appetite x ~3 days. Pt states prior to this she had a fall 2 weeks ago, last known prior fall was over twenty years ago. Pt denies fever, chills, CP, cough, SOB, abdominal pain, constipation, diarrhea, edema. As per chart review daughter stated pt has been needing to use a walker due to increased weakness for approx 1 week. Daughter had found patient after fall 2 weeks ago, hit the back of her head, no LOC, was not using a walker at that time. Pt fell again 8 hours later in the restroom, was using a walker at that time, walker was on top of her, and daughter noted pt's left foot appeared swollen and painful and pt had vomited x 1. Patient also appeared confused yesterday, "mumbling" her words, told her daughter her "head felt full". Daughter states pt appeared to improve after a milkshake, may have been dehydrated, has had a similar symptoms in the past but to a lesser severity. Pt's grandson was attempting to help pt into the car for her doctor appointment, however was was too weak to get into the car, EMS was called at that time. As per daughter pt has been having yellowish diarrhea x couple of days.         ---    HOSPITAL COURSE:     Patient was admitted to medicine service. For foot pain and fx's noted on imaging, the pt was seen by podiatry (Dr. Sosa) and no surgical intervention at this time. Pt deemed not a surgical candidate for reduction of fractures to the left foot given the current medical condition and comorbidities. Patient to stay non weight bearing to the left lower extremity. She is to follow up with Dr. Fiore/Dr. Sosa one week after discharge at [facility]. For AMS, patient was seen by neurology (Dr. Weber). Her symptoms are likely due to underlying mild cognitive impairment versus subtle dementia. Patient was evaluated by PT and recommended dispo to [facility]. This was discussed with patients daughter at length but her daughter ultimately elected to have patient return home with PT and outpatient podiatry follow up understanding patient is a 1 person assist and at risk of further falls and deconditioning. Patient will be discharged with a surgical shoe per podiatry.  Patient showed gradual improvement over hospitalization and is optimized for discharge with close outpatient PMD monitoring and follow up.        ---    I, the attending physician, was physically present for the key portions of the evaluation and management (E/M) service provided.  I agree with the above findings which I have reviewed and edited where appropriate.  The total amount of time spent preparing the discharge and follow up plan was -- minutes.         --- FROM ADMISSION H+P:     HPI:    88 year old female with history of autoimmune hepatitis, hypothyroid, HTN, mild dementia (but never officially diagnosed as per daughter) presents with generalized weakness with falls, confusion, and left foot pain. Hx taken from charts and patient, (daughter not answering phone when attempted to reach, no option for voicemail). Pt states she slid in the bathroom yesterday and hit her left foot and head. Pt denies lightheadedness, dizziness, palpitations, or chest pain prior to the fall. Pt also states she has decreased PO intake due to no appetite x ~3 days. Pt states prior to this she had a fall 2 weeks ago, last known prior fall was over twenty years ago. Pt denies fever, chills, CP, cough, SOB, abdominal pain, constipation, diarrhea, edema. As per chart review daughter stated pt has been needing to use a walker due to increased weakness for approx 1 week. Daughter had found patient after fall 2 weeks ago, hit the back of her head, no LOC, was not using a walker at that time. Pt fell again 8 hours later in the restroom, was using a walker at that time, walker was on top of her, and daughter noted pt's left foot appeared swollen and painful and pt had vomited x 1. Patient also appeared confused yesterday, "mumbling" her words, told her daughter her "head felt full". Daughter states pt appeared to improve after a milkshake, may have been dehydrated, has had a similar symptoms in the past but to a lesser severity. Pt's grandson was attempting to help pt into the car for her doctor appointment, however was was too weak to get into the car, EMS was called at that time. As per daughter pt has been having yellowish diarrhea x couple of days.         ---    HOSPITAL COURSE:     Patient was admitted to medicine service. For foot pain and fx's noted on imaging, the pt was seen by podiatry (Dr. Sosa) and no surgical intervention at this time. Pt deemed not a surgical candidate for reduction of fractures to the left foot given the current medical condition and comorbidities. Patient to stay non weight bearing to the left lower extremity. She is to follow up with Dr. Fiore/Dr. Sosa one week after discharge at [facility]. For AMS, patient was seen by neurology (Dr. Weber). Her symptoms are likely due to underlying mild cognitive impairment versus subtle dementia. Patient was evaluated by PT and recommended dispo to [facility]. This was discussed with patients daughter at length but her daughter ultimately elected to have patient return home with PT and outpatient podiatry follow up understanding patient is a 1 person assist and at risk of further falls and deconditioning. Patient will be discharged with a surgical shoe per podiatry.  Patient showed gradual improvement over hospitalization and is optimized for discharge with close outpatient PMD monitoring and follow up.        ---    I, the attending physician, was physically present for the key portions of the evaluation and management (E/M) service provided.  I agree with the above findings which I have reviewed and edited where appropriate.  The total amount of time spent preparing the discharge and follow up plan was 40 minutes.         ---    PCP Dr. Godinez's office was made aware of hospitalization.     Pt was seen on day of discharge, see progress note.

## 2020-08-05 NOTE — DIETITIAN INITIAL EVALUATION ADULT. - PROBLEM SELECTOR PLAN 4
multiple fractures L foot pt with swollen and painful L foot s/p fall at home   X ray foot AP lateral oblique L, f/u X ray ankle 3 views left: There is a fracture of the distal aspect of the proximal phalanx of the great toe. There also fractures of the proximal shafts of the proximal phalanges of toes 3, 4, and 5.  pain control tylneol 650mg for mild pain  consult Podiatry

## 2020-08-05 NOTE — PROGRESS NOTE ADULT - PROBLEM SELECTOR PLAN 3
acute on chronic  generalized weakness 2/2 decreased PO intake in setting of dementia  advanced age  DASH/TLC diet  nutrition consult appreciated acute on chronic  generalized weakness 2/2 decreased PO intake in setting of cognitive impairment  advanced age, frail state  DASH/TLC diet  nutrition consult appreciated - no nutrition dx detected as pt is likely meeting her nutritional needs

## 2020-08-06 ENCOUNTER — TRANSCRIPTION ENCOUNTER (OUTPATIENT)
Age: 85
End: 2020-08-06

## 2020-08-06 VITALS
HEART RATE: 61 BPM | OXYGEN SATURATION: 92 % | DIASTOLIC BLOOD PRESSURE: 69 MMHG | RESPIRATION RATE: 17 BRPM | TEMPERATURE: 98 F | SYSTOLIC BLOOD PRESSURE: 128 MMHG

## 2020-08-06 LAB
ANION GAP SERPL CALC-SCNC: 4 MMOL/L — LOW (ref 5–17)
BUN SERPL-MCNC: 12 MG/DL — SIGNIFICANT CHANGE UP (ref 7–23)
CALCIUM SERPL-MCNC: 8.5 MG/DL — SIGNIFICANT CHANGE UP (ref 8.5–10.1)
CHLORIDE SERPL-SCNC: 105 MMOL/L — SIGNIFICANT CHANGE UP (ref 96–108)
CO2 SERPL-SCNC: 30 MMOL/L — SIGNIFICANT CHANGE UP (ref 22–31)
CREAT SERPL-MCNC: 0.6 MG/DL — SIGNIFICANT CHANGE UP (ref 0.5–1.3)
GLUCOSE SERPL-MCNC: 84 MG/DL — SIGNIFICANT CHANGE UP (ref 70–99)
HCT VFR BLD CALC: 34.8 % — SIGNIFICANT CHANGE UP (ref 34.5–45)
HGB BLD-MCNC: 11.5 G/DL — SIGNIFICANT CHANGE UP (ref 11.5–15.5)
MCHC RBC-ENTMCNC: 30.3 PG — SIGNIFICANT CHANGE UP (ref 27–34)
MCHC RBC-ENTMCNC: 33 GM/DL — SIGNIFICANT CHANGE UP (ref 32–36)
MCV RBC AUTO: 91.8 FL — SIGNIFICANT CHANGE UP (ref 80–100)
NRBC # BLD: 0 /100 WBCS — SIGNIFICANT CHANGE UP (ref 0–0)
PLATELET # BLD AUTO: 252 K/UL — SIGNIFICANT CHANGE UP (ref 150–400)
POTASSIUM SERPL-MCNC: 4.6 MMOL/L — SIGNIFICANT CHANGE UP (ref 3.5–5.3)
POTASSIUM SERPL-SCNC: 4.6 MMOL/L — SIGNIFICANT CHANGE UP (ref 3.5–5.3)
RBC # BLD: 3.79 M/UL — LOW (ref 3.8–5.2)
RBC # FLD: 13.6 % — SIGNIFICANT CHANGE UP (ref 10.3–14.5)
SODIUM SERPL-SCNC: 139 MMOL/L — SIGNIFICANT CHANGE UP (ref 135–145)
WBC # BLD: 9.71 K/UL — SIGNIFICANT CHANGE UP (ref 3.8–10.5)
WBC # FLD AUTO: 9.71 K/UL — SIGNIFICANT CHANGE UP (ref 3.8–10.5)

## 2020-08-06 PROCEDURE — 99285 EMERGENCY DEPT VISIT HI MDM: CPT

## 2020-08-06 PROCEDURE — 36415 COLL VENOUS BLD VENIPUNCTURE: CPT

## 2020-08-06 PROCEDURE — 82746 ASSAY OF FOLIC ACID SERUM: CPT

## 2020-08-06 PROCEDURE — 87635 SARS-COV-2 COVID-19 AMP PRB: CPT

## 2020-08-06 PROCEDURE — 86769 SARS-COV-2 COVID-19 ANTIBODY: CPT

## 2020-08-06 PROCEDURE — 87086 URINE CULTURE/COLONY COUNT: CPT

## 2020-08-06 PROCEDURE — 73502 X-RAY EXAM HIP UNI 2-3 VIEWS: CPT

## 2020-08-06 PROCEDURE — 96360 HYDRATION IV INFUSION INIT: CPT

## 2020-08-06 PROCEDURE — 73610 X-RAY EXAM OF ANKLE: CPT

## 2020-08-06 PROCEDURE — 97530 THERAPEUTIC ACTIVITIES: CPT

## 2020-08-06 PROCEDURE — 85730 THROMBOPLASTIN TIME PARTIAL: CPT

## 2020-08-06 PROCEDURE — 99239 HOSP IP/OBS DSCHRG MGMT >30: CPT | Mod: GC

## 2020-08-06 PROCEDURE — 82607 VITAMIN B-12: CPT

## 2020-08-06 PROCEDURE — 73630 X-RAY EXAM OF FOOT: CPT

## 2020-08-06 PROCEDURE — 71045 X-RAY EXAM CHEST 1 VIEW: CPT

## 2020-08-06 PROCEDURE — 85610 PROTHROMBIN TIME: CPT

## 2020-08-06 PROCEDURE — 85027 COMPLETE CBC AUTOMATED: CPT

## 2020-08-06 PROCEDURE — 93005 ELECTROCARDIOGRAM TRACING: CPT

## 2020-08-06 PROCEDURE — 93971 EXTREMITY STUDY: CPT

## 2020-08-06 PROCEDURE — 81001 URINALYSIS AUTO W/SCOPE: CPT

## 2020-08-06 PROCEDURE — 73562 X-RAY EXAM OF KNEE 3: CPT

## 2020-08-06 PROCEDURE — 70450 CT HEAD/BRAIN W/O DYE: CPT

## 2020-08-06 PROCEDURE — 80048 BASIC METABOLIC PNL TOTAL CA: CPT

## 2020-08-06 PROCEDURE — 72125 CT NECK SPINE W/O DYE: CPT

## 2020-08-06 PROCEDURE — 97161 PT EVAL LOW COMPLEX 20 MIN: CPT

## 2020-08-06 PROCEDURE — 84443 ASSAY THYROID STIM HORMONE: CPT

## 2020-08-06 PROCEDURE — 80053 COMPREHEN METABOLIC PANEL: CPT

## 2020-08-06 RX ADMIN — ENOXAPARIN SODIUM 40 MILLIGRAM(S): 100 INJECTION SUBCUTANEOUS at 11:24

## 2020-08-06 RX ADMIN — Medication 50 MILLIGRAM(S): at 05:15

## 2020-08-06 RX ADMIN — Medication 75 MICROGRAM(S): at 05:15

## 2020-08-06 RX ADMIN — ESCITALOPRAM OXALATE 5 MILLIGRAM(S): 10 TABLET, FILM COATED ORAL at 11:24

## 2020-08-06 NOTE — PROGRESS NOTE ADULT - PROBLEM SELECTOR PLAN 8
chronic, BP controlled   cont home dose metoprolol succinate with hold parameters
chronic, BP controlled   cont home dose metoprolol succinate with hold parameters
chronic, BP controlled on admission   cont home dose metoprolol succinate with hold parameters

## 2020-08-06 NOTE — PROGRESS NOTE ADULT - PROBLEM SELECTOR PLAN 9
dvt ppx lovenox 40     IMPROVE VTE Individual Risk Assessment        RISK                                                          Points  [  ] Previous VTE                                                3  [  ] Thrombophilia                                             2  [  ] Lower limb paralysis                                   2        (unable to hold up >15 seconds)    [  ] Current Cancer                                            2         (within 6 months)  [  ] Immobilization > 24 hrs                              1  [  ] ICU/CCU stay > 24 hours                            1  [ x ] Age > 60                                                    1  IMPROVE VTE Score ____1_____ VTE ppx lovenox 40     IMPROVE VTE Individual Risk Assessment        RISK                                                          Points  [  ] Previous VTE                                                3  [  ] Thrombophilia                                             2  [  ] Lower limb paralysis                                   2        (unable to hold up >15 seconds)    [  ] Current Cancer                                            2         (within 6 months)  [  ] Immobilization > 24 hrs                              1  [  ] ICU/CCU stay > 24 hours                            1  [ x ] Age > 60                                                    1  IMPROVE VTE Score ____1_____

## 2020-08-06 NOTE — PROGRESS NOTE ADULT - PROBLEM SELECTOR PLAN 4
multiple fractures L foot pt with swollen and painful L foot s/p fall at home   X ray foot AP lateral oblique L, f/u X ray ankle 3 views left: There is a fracture of the distal aspect of the proximal phalanx of the great toe. There also fractures of the proximal shafts of the proximal phalanges of toes 3, 4, and 5.  pain control Tylenol 650mg for mild pain  Podiatry input appreciated, conservative management  Patient to stay non weight bearing to the left lower extremity   Patient is to follow up with Dr. Fiore/Dr. Sosa one week after discharge at [facility].
multiple fractures L foot pt with swollen and painful L foot s/p fall at home   X ray foot AP lateral oblique L, f/u X ray ankle 3 views left: There is a fracture of the distal aspect of the proximal phalanx of the great toe. There also fractures of the proximal shafts of the proximal phalanges of toes 3, 4, and 5.  pain control Tylenol 650mg for mild pain  Podiatry input appreciated, conservative management
multiple fractures L foot pt with swollen and painful L foot s/p fall at home   X ray foot AP lateral oblique L, f/u X ray ankle 3 views left: There is a fracture of the distal aspect of the proximal phalanx of the great toe. There also fractures of the proximal shafts of the proximal phalanges of toes 3, 4, and 5.  pain control tylneol 650mg for mild pain  consult Podiatry

## 2020-08-06 NOTE — PROGRESS NOTE ADULT - SUBJECTIVE AND OBJECTIVE BOX
Patient is a 88y old  Female who presents with a chief complaint of weakness, confusion, left foot pain (05 Aug 2020 18:43)      FROM ADMISSION H+P:   HPI:  88 year old female with history of autoimmune hepatitis, hypothyroid, HTN, mild dementia (but never officially diagnosed as per daughter) presents with generalized weakness with falls, confusion, and left foot pain. Hx taken from charts and patient, (daughter not answering phone when attempted to reach, no option for voicemail). Pt states she slid in the bathroom yesterday and hit her left foot and head. Pt denies lightheadedness, dizziness, palpitations, or chest pain prior to the fall. Pt also states she has decreased PO intake due to no appetite x ~3 days. Pt states prior to this she had a fall 2 weeks ago, last known prior fall was over twenty years ago. Pt denies fever, chills, CP, cough, SOB, abdominal pain, constipation, diarrhea, edema. As per chart review daughter stated pt has been needing to use a walker due to increased weakness for approx 1 week. Daughter had found patient after fall 2 weeks ago, hit the back of her head, no LOC, was not using a walker at that time. Pt fell again 8 hours later in the restroom, was using a walker at that time, walker was on top of her, and daughter noted pt's left foot appeared swollen and painful and pt had vomited x 1. Patient also appeared confused yesterday, "mumbling" her words, told her daughter her "head felt full". Daughter states pt appeared to improve after a milkshake, may have been dehydrated, has had a similar symptoms in the past but to a lesser severity. Pt's grandson was attempting to help pt into the car for her doctor appointment, however was was too weak to get into the car, EMS was called at that time. As per daughter pt has been having yellowish diarrhea x couple of days.     ED vitals afebrile, HR 68, /64, 99% on room air, RR 18. Hgb 11.4, last known prior 13.1, albumin 2.5, AST 42, UA negative. Pt given .5 L IVF bolus. CT head shows  chronic ischemic changes in both hemispheres with volume loss, no acute abnormality ie no contusion or hemorrhage CT C spine no acute fracture identified. Underlying severe scoliosis and chronic degenerative changes. EKG 65 bpm NSR no ischemic changes noted. (03 Aug 2020 16:02)      ----  INTERVAL HPI/OVERNIGHT EVENTS: Pt seen and evaluated at the bedside. No acute overnight events occurred. Patient reports feeling well this morning. Still has some pain in left foot and toes. No other acute complaints. Discussed plan of care with daughter in detail. Awaiting placement in [facility] coordinating with family.       ----  PAST MEDICAL & SURGICAL HISTORY:  Hepatitis: autoimmune hepatitis 20 years ago  Hypothyroid  Essential hypertension  H/O abdominal hysterectomy  History of cholecystectomy  S/P appy      FAMILY HISTORY:  Family history of stroke  Family history of heart disease      ----  MEDICATIONS  (STANDING):  enoxaparin Injectable 40 milliGRAM(s) SubCutaneous daily  escitalopram 5 milliGRAM(s) Oral daily  levothyroxine 75 MICROGram(s) Oral daily  metoprolol succinate ER 50 milliGRAM(s) Oral daily    MEDICATIONS  (PRN):  acetaminophen   Tablet .. 650 milliGRAM(s) Oral every 6 hours PRN Mild Pain (1 - 3)        ----  REVIEW OF SYSTEMS: - ROS are not reliable given chronic cognitive impairment but the following were obtained  CONSTITUTIONAL: No fever, fatigue  RESPIRATORY: No cough, wheezing, chills or hemoptysis; No shortness of breath  CARDIOVASCULAR: No chest pain, palpitations, dizziness, or leg swelling  GASTROINTESTINAL: No abdominal or epigastric pain. No nausea, vomiting  NEUROLOGICAL: No headaches, no loss of strength, numbness, or tremors  MUSCULOSKELETAL: Admits mild left foot pain. No joint swelling; No muscle, back pain      PHYSICAL EXAM:  GENERAL: NAD, well-groomed, appears stated age, nontoxic  NERVOUS SYSTEM:  Alert; Motor Strength 3/5 B/L upper and lower extremities, oriented to person only  CHEST/LUNG: Clear to auscultation bilaterally; No rales, rhonchi, wheezing, or rubs  HEART: Regular rate and rhythm; No murmurs, rubs, or gallops  ABDOMEN: Soft, Nontender, Nondistended; Bowel sounds present  EXTREMITIES:  2+ Peripheral Pulses, No clubbing, cyanosis, ecchymotic area on left forefoot. Able to move all toes.   SKIN: mild pallor noted, cap refill <2s in b/l hands    T(C): 36.8 (08-06-20 @ 04:30), Max: 36.8 (08-05-20 @ 14:09)  HR: 62 (08-06-20 @ 04:30) (62 - 99)  BP: 146/68 (08-06-20 @ 04:30) (120/73 - 146/68)  RR: 17 (08-06-20 @ 04:30) (16 - 17)  SpO2: 93% (08-06-20 @ 04:30) (91% - 93%)  Wt(kg): --    ----  I&O's Summary    05 Aug 2020 07:01  -  06 Aug 2020 07:00  --------------------------------------------------------  IN: 120 mL / OUT: 0 mL / NET: 120 mL        LABS:                        11.2   8.47  )-----------( 236      ( 05 Aug 2020 08:05 )             34.1     08-05    138  |  102  |  10  ----------------------------<  86  4.4   |  32<H>  |  0.58    Ca    8.4<L>      05 Aug 2020 08:05    TPro  7.0  /  Alb  2.3<L>  /  TBili  0.5  /  DBili  x   /  AST  30  /  ALT  26  /  AlkPhos  63  08-05        CAPILLARY BLOOD GLUCOSE          08-03 @ 18:55   >=3 organisms. Probable collection contamination.  --  --            ----  Personally reviewed:  Vital sign trends: [ X ] yes    [  ] no     [  ] n/a  Laboratory results: [X  ] yes    [  ] no     [  ] n/a  Radiology results: [X  ] yes    [  ] no     [  ] n/a  Culture results: [x] yes    [  ] no     [  ] n/a  Consultant recommendations: [X  ] yes    [  ] no     [  ] n/a Patient is a 88y old  Female who presents with a chief complaint of weakness, confusion, left foot pain (05 Aug 2020 18:43)      FROM ADMISSION H+P:   HPI:  88 year old female with history of autoimmune hepatitis, hypothyroid, HTN, mild dementia (but never officially diagnosed as per daughter) presents with generalized weakness with falls, confusion, and left foot pain. Hx taken from charts and patient, (daughter not answering phone when attempted to reach, no option for voicemail). Pt states she slid in the bathroom yesterday and hit her left foot and head. Pt denies lightheadedness, dizziness, palpitations, or chest pain prior to the fall. Pt also states she has decreased PO intake due to no appetite x ~3 days. Pt states prior to this she had a fall 2 weeks ago, last known prior fall was over twenty years ago. Pt denies fever, chills, CP, cough, SOB, abdominal pain, constipation, diarrhea, edema. As per chart review daughter stated pt has been needing to use a walker due to increased weakness for approx 1 week. Daughter had found patient after fall 2 weeks ago, hit the back of her head, no LOC, was not using a walker at that time. Pt fell again 8 hours later in the restroom, was using a walker at that time, walker was on top of her, and daughter noted pt's left foot appeared swollen and painful and pt had vomited x 1. Patient also appeared confused yesterday, "mumbling" her words, told her daughter her "head felt full". Daughter states pt appeared to improve after a milkshake, may have been dehydrated, has had a similar symptoms in the past but to a lesser severity. Pt's grandson was attempting to help pt into the car for her doctor appointment, however was was too weak to get into the car, EMS was called at that time. As per daughter pt has been having yellowish diarrhea x couple of days.     ED vitals afebrile, HR 68, /64, 99% on room air, RR 18. Hgb 11.4, last known prior 13.1, albumin 2.5, AST 42, UA negative. Pt given .5 L IVF bolus. CT head shows  chronic ischemic changes in both hemispheres with volume loss, no acute abnormality ie no contusion or hemorrhage CT C spine no acute fracture identified. Underlying severe scoliosis and chronic degenerative changes. EKG 65 bpm NSR no ischemic changes noted. (03 Aug 2020 16:02)      ----  INTERVAL HPI/OVERNIGHT EVENTS: Pt seen and evaluated at the bedside. No acute overnight events occurred. Patient reports feeling well this morning. Still has some pain in left foot and toes. No other acute complaints. Discussed plan of care with daughter in detail. Daughter refusing AMAN, requesting patient return home with PT and outpatient podiatry follow up    ----  PAST MEDICAL & SURGICAL HISTORY:  Hepatitis: autoimmune hepatitis 20 years ago  Hypothyroid  Essential hypertension  H/O abdominal hysterectomy  History of cholecystectomy  S/P appy      FAMILY HISTORY:  Family history of stroke  Family history of heart disease      ----  MEDICATIONS  (STANDING):  enoxaparin Injectable 40 milliGRAM(s) SubCutaneous daily  escitalopram 5 milliGRAM(s) Oral daily  levothyroxine 75 MICROGram(s) Oral daily  metoprolol succinate ER 50 milliGRAM(s) Oral daily    MEDICATIONS  (PRN):  acetaminophen   Tablet .. 650 milliGRAM(s) Oral every 6 hours PRN Mild Pain (1 - 3)        ----  REVIEW OF SYSTEMS: - ROS are not reliable given chronic cognitive impairment but the following were obtained  CONSTITUTIONAL: No fever, fatigue  RESPIRATORY: No cough, wheezing, chills or hemoptysis; No shortness of breath  CARDIOVASCULAR: No chest pain, palpitations, dizziness, or leg swelling  GASTROINTESTINAL: No abdominal or epigastric pain. No nausea, vomiting  NEUROLOGICAL: No headaches, no loss of strength, numbness, or tremors  MUSCULOSKELETAL: Admits mild left foot pain. No joint swelling; No muscle, back pain      PHYSICAL EXAM:  GENERAL: NAD, well-groomed, appears stated age, nontoxic  NERVOUS SYSTEM:  Alert; Motor Strength 3/5 B/L upper and lower extremities, oriented to person only  CHEST/LUNG: Clear to auscultation bilaterally; No rales, rhonchi, wheezing, or rubs  HEART: Regular rate and rhythm; No murmurs, rubs, or gallops  ABDOMEN: Soft, Nontender, Nondistended; Bowel sounds present  EXTREMITIES:  2+ Peripheral Pulses, No clubbing, cyanosis, ecchymotic area on left forefoot. Able to move all toes.   SKIN: mild pallor noted, cap refill <2s in b/l hands    T(C): 36.8 (08-06-20 @ 04:30), Max: 36.8 (08-05-20 @ 14:09)  HR: 62 (08-06-20 @ 04:30) (62 - 99)  BP: 146/68 (08-06-20 @ 04:30) (120/73 - 146/68)  RR: 17 (08-06-20 @ 04:30) (16 - 17)  SpO2: 93% (08-06-20 @ 04:30) (91% - 93%)  Wt(kg): --    ----  I&O's Summary    05 Aug 2020 07:01  -  06 Aug 2020 07:00  --------------------------------------------------------  IN: 120 mL / OUT: 0 mL / NET: 120 mL        LABS:                        11.2   8.47  )-----------( 236      ( 05 Aug 2020 08:05 )             34.1     08-05    138  |  102  |  10  ----------------------------<  86  4.4   |  32<H>  |  0.58    Ca    8.4<L>      05 Aug 2020 08:05    TPro  7.0  /  Alb  2.3<L>  /  TBili  0.5  /  DBili  x   /  AST  30  /  ALT  26  /  AlkPhos  63  08-05        CAPILLARY BLOOD GLUCOSE          08-03 @ 18:55   >=3 organisms. Probable collection contamination.  --  --            ----  Personally reviewed:  Vital sign trends: [ X ] yes    [  ] no     [  ] n/a  Laboratory results: [X  ] yes    [  ] no     [  ] n/a  Radiology results: [X  ] yes    [  ] no     [  ] n/a  Culture results: [x] yes    [  ] no     [  ] n/a  Consultant recommendations: [X  ] yes    [  ] no     [  ] n/a Patient is a 88y old  Female who presents with a chief complaint of weakness, confusion, left foot pain (05 Aug 2020 18:43)      FROM ADMISSION H+P:   HPI:  88 year old female with history of autoimmune hepatitis, hypothyroid, HTN, mild dementia (but never officially diagnosed as per daughter) presents with generalized weakness with falls, confusion, and left foot pain. Hx taken from charts and patient, (daughter not answering phone when attempted to reach, no option for voicemail). Pt states she slid in the bathroom yesterday and hit her left foot and head. Pt denies lightheadedness, dizziness, palpitations, or chest pain prior to the fall. Pt also states she has decreased PO intake due to no appetite x ~3 days. Pt states prior to this she had a fall 2 weeks ago, last known prior fall was over twenty years ago. Pt denies fever, chills, CP, cough, SOB, abdominal pain, constipation, diarrhea, edema. As per chart review daughter stated pt has been needing to use a walker due to increased weakness for approx 1 week. Daughter had found patient after fall 2 weeks ago, hit the back of her head, no LOC, was not using a walker at that time. Pt fell again 8 hours later in the restroom, was using a walker at that time, walker was on top of her, and daughter noted pt's left foot appeared swollen and painful and pt had vomited x 1. Patient also appeared confused yesterday, "mumbling" her words, told her daughter her "head felt full". Daughter states pt appeared to improve after a milkshake, may have been dehydrated, has had a similar symptoms in the past but to a lesser severity. Pt's grandson was attempting to help pt into the car for her doctor appointment, however was was too weak to get into the car, EMS was called at that time. As per daughter pt has been having yellowish diarrhea x couple of days.     ----  INTERVAL HPI/OVERNIGHT EVENTS:   Pt seen and evaluated at the bedside. No acute overnight events occurred. Patient reports feeling well this morning. Still has some pain in left foot and toes. No other acute complaints. Discussed plan of care with daughter in detail. Daughter refusing AMAN, requesting patient return home with PT and outpatient podiatry follow up    ----  PAST MEDICAL & SURGICAL HISTORY:  Hepatitis: autoimmune hepatitis 20 years ago  Hypothyroid  Essential hypertension  H/O abdominal hysterectomy  History of cholecystectomy  S/P appy      FAMILY HISTORY:  Family history of stroke  Family history of heart disease      ----  MEDICATIONS  (STANDING):  enoxaparin Injectable 40 milliGRAM(s) SubCutaneous daily  escitalopram 5 milliGRAM(s) Oral daily  levothyroxine 75 MICROGram(s) Oral daily  metoprolol succinate ER 50 milliGRAM(s) Oral daily    MEDICATIONS  (PRN):  acetaminophen   Tablet .. 650 milliGRAM(s) Oral every 6 hours PRN Mild Pain (1 - 3)        ----  REVIEW OF SYSTEMS: - ROS are not reliable given chronic cognitive impairment but the following were obtained  CONSTITUTIONAL: No fever, fatigue  RESPIRATORY: No cough, wheezing, chills or hemoptysis; No shortness of breath  CARDIOVASCULAR: No chest pain, palpitations, dizziness, or leg swelling  GASTROINTESTINAL: No abdominal or epigastric pain. No nausea, vomiting  NEUROLOGICAL: No headaches, no loss of strength, numbness, or tremors  MUSCULOSKELETAL: Admits mild left foot pain. No joint swelling; No muscle, back pain      PHYSICAL EXAM:  GENERAL: NAD, well-groomed, appears stated age, nontoxic  NERVOUS SYSTEM:  Alert; Motor Strength 3/5 B/L upper and lower extremities, oriented to person only  CHEST/LUNG: Clear to auscultation bilaterally; No rales, rhonchi, wheezing, or rubs  HEART: Regular rate and rhythm; No murmurs, rubs, or gallops  ABDOMEN: Soft, Nontender, Nondistended; Bowel sounds present  EXTREMITIES:  2+ Peripheral Pulses, No clubbing, cyanosis, ecchymotic area on left forefoot. Able to move all toes.   SKIN: mild pallor noted, cap refill <2s in b/l hands    T(C): 36.8 (08-06-20 @ 04:30), Max: 36.8 (08-05-20 @ 14:09)  HR: 62 (08-06-20 @ 04:30) (62 - 99)  BP: 146/68 (08-06-20 @ 04:30) (120/73 - 146/68)  RR: 17 (08-06-20 @ 04:30) (16 - 17)  SpO2: 93% (08-06-20 @ 04:30) (91% - 93%)  Wt(kg): --    ----  I&O's Summary    05 Aug 2020 07:01  -  06 Aug 2020 07:00  --------------------------------------------------------  IN: 120 mL / OUT: 0 mL / NET: 120 mL        LABS:                        11.2   8.47  )-----------( 236      ( 05 Aug 2020 08:05 )             34.1     08-05    138  |  102  |  10  ----------------------------<  86  4.4   |  32<H>  |  0.58    Ca    8.4<L>      05 Aug 2020 08:05    TPro  7.0  /  Alb  2.3<L>  /  TBili  0.5  /  DBili  x   /  AST  30  /  ALT  26  /  AlkPhos  63  08-05        CAPILLARY BLOOD GLUCOSE          08-03 @ 18:55   >=3 organisms. Probable collection contamination.  --  --            ----  Personally reviewed:  Vital sign trends: [ X ] yes    [  ] no     [  ] n/a  Laboratory results: [X  ] yes    [  ] no     [  ] n/a  Radiology results: [X  ] yes    [  ] no     [  ] n/a  Culture results: [x] yes    [  ] no     [  ] n/a  Consultant recommendations: [X  ] yes    [  ] no     [  ] n/a

## 2020-08-06 NOTE — PROGRESS NOTE ADULT - SUBJECTIVE AND OBJECTIVE BOX
Neurology follow up note    JOESPH [id number]      Interval History:    Patient feels ok occ foot pain     MEDICATIONS    acetaminophen   Tablet .. 650 milliGRAM(s) Oral every 6 hours PRN  enoxaparin Injectable 40 milliGRAM(s) SubCutaneous daily  escitalopram 5 milliGRAM(s) Oral daily  levothyroxine 75 MICROGram(s) Oral daily  metoprolol succinate ER 50 milliGRAM(s) Oral daily      Allergies    latex (Rash)  penicillin (Unknown)  sulfa drugs (Unknown)    Intolerances            Vital Signs Last 24 Hrs  T(C): 36.8 (06 Aug 2020 04:30), Max: 36.8 (05 Aug 2020 14:09)  T(F): 98.3 (06 Aug 2020 04:30), Max: 98.3 (06 Aug 2020 04:30)  HR: 62 (06 Aug 2020 04:30) (62 - 99)  BP: 146/68 (06 Aug 2020 04:30) (120/73 - 146/68)  BP(mean): --  RR: 17 (06 Aug 2020 04:30) (16 - 17)  SpO2: 93% (06 Aug 2020 04:30) (91% - 93%)    REVIEW OF SYSTEMS:  Constitutional:  The patient denies fever, chills, or night sweats.  Head:  No headache.  Eyes:  No double vision or blurry vision.  Ears:  No ringing in her ears.  Neck:  No neck pain.  Respiratory:  No shortness of breath.  Cardiovascular:  No chest pain.  Abdomen:  No nausea, vomiting, or abdominal pain.  Extremities/Neurologic:  No numbness or tingling.  Musculoskeletal:  Positive history of joint pain and also left foot pain.    PHYSICAL EXAMINATION:  HEENT:  Head:  Normocephalic, atraumatic.  Eyes:  No scleral icterus.  Ears:  Hearing bilaterally intact.  NECK:  Supple.  RESPIRATORY:  Good air entry bilaterally.  CARDIOVASCULAR:  S1 and S2 heard.  ABDOMEN:  Soft and nontender.  EXTREMITIES:  No clubbing or cyanosis were noted.      NEUROLOGIC:  The patient is awake and alert.   Extraocular movements were intact.  Pupils were equal, round, and reactive bilaterally 3 mm to 2 mm.  Speech was fluent.  Smile was symmetric.  Motor:  Bilateral upper were 4/5, bilateral lower were 4-/5, has decreased range of motion of the left foot secondary to fractures.                 LABS:  CBC Full  -  ( 05 Aug 2020 08:05 )  WBC Count : 8.47 K/uL  RBC Count : 3.69 M/uL  Hemoglobin : 11.2 g/dL  Hematocrit : 34.1 %  Platelet Count - Automated : 236 K/uL  Mean Cell Volume : 92.4 fl  Mean Cell Hemoglobin : 30.4 pg  Mean Cell Hemoglobin Concentration : 32.8 gm/dL  Auto Neutrophil # : 5.04 K/uL  Auto Lymphocyte # : 2.22 K/uL  Auto Monocyte # : 0.96 K/uL  Auto Eosinophil # : 0.18 K/uL  Auto Basophil # : 0.04 K/uL  Auto Neutrophil % : 59.5 %  Auto Lymphocyte % : 26.2 %  Auto Monocyte % : 11.3 %  Auto Eosinophil % : 2.1 %  Auto Basophil % : 0.5 %      08-05    138  |  102  |  10  ----------------------------<  86  4.4   |  32<H>  |  0.58    Ca    8.4<L>      05 Aug 2020 08:05    TPro  7.0  /  Alb  2.3<L>  /  TBili  0.5  /  DBili  x   /  AST  30  /  ALT  26  /  AlkPhos  63  08-05    Hemoglobin A1C:     LIVER FUNCTIONS - ( 05 Aug 2020 08:05 )  Alb: 2.3 g/dL / Pro: 7.0 g/dL / ALK PHOS: 63 U/L / ALT: 26 U/L / AST: 30 U/L / GGT: x           Vitamin B12         RADIOLOGY          ANALYSIS AND PLAN:  An 88-year-old with an episode of altered mental status and history of falls.  1.	For altered mental status, suspect this could be secondary to underlying mild cognitive impairment versus subtle dementia, becoming more prominent in the hospital setting.   2.	For mild cognitive impairment versus subtle  3.	For history of falls, possibly secondary to age-related changes, would recommend physical therapy, less likely this is from a cerebrovascular accident that has been going on for the last few months.  4.	For history of hypothyroidism, continue the patient on Synthroid.  5.	For history of hypertension, monitor systolic blood pressure.  6.	Fall precautions, safety concerns were provided to the family.  7.	no new events   8.	DaughterAmina's telephone number is [phone].  Her cell number is [phone]. 8/5/2020  9.	Greater than 35 minutes of time was spent with the patient, plan of care, reviewing data, speaking to the family and multidisciplinary healthcare team.

## 2020-08-06 NOTE — PROGRESS NOTE ADULT - PROBLEM SELECTOR PLAN 3
acute on chronic  generalized weakness 2/2 decreased PO intake in setting of cognitive impairment  advanced age, frail state  DASH/TLC diet  nutrition consult appreciated - no nutrition dx detected as pt is likely meeting her nutritional needs

## 2020-08-06 NOTE — PROGRESS NOTE ADULT - ASSESSMENT
88 year old female with history of autoimmune hepatitis, hypothyroid, HTN, mild dementia (but never officially diagnosed as per daughter) presents with generalized weakness with falls, confusion, and left foot pain, admit for falls and AMS likely 2/2 worsening cognitive impairment, will likely need rehab placement.

## 2020-08-06 NOTE — PROGRESS NOTE ADULT - REASON FOR ADMISSION
weakness, confusion, left foot pain

## 2020-08-06 NOTE — PROGRESS NOTE ADULT - PROBLEM SELECTOR PLAN 6
hx of autoimmune hepatitis 20 years ago   LFT wnl
hx of autoimmune hepatitis 20 years ago   LFT wnl
hx of autoimmune hepatitis 20 years ago   AST mildly elevated/borderline  -f/u AM CMP

## 2020-08-06 NOTE — DISCHARGE NOTE NURSING/CASE MANAGEMENT/SOCIAL WORK - PATIENT PORTAL LINK FT
You can access the FollowMyHealth Patient Portal offered by [facility] by registering at the following website: [email or web address]. By joining [facility]’s FollowMyHealth portal, you will also be able to view your health information using other applications (apps) compatible with our system.

## 2020-08-06 NOTE — PROGRESS NOTE ADULT - PROBLEM SELECTOR PLAN 7
chronic  continue home synthroid
chronic  continue home synthroid
chronic  continue home synthroid  f/u TSH in AM

## 2020-08-06 NOTE — PROGRESS NOTE ADULT - ATTENDING COMMENTS
I personally conducted a physical examination of the patient. I personally gathered the patient's history. I edited the above listed findings which were prepared by the listed resident physician. I personally discussed the plan of care with the patient. The questions and concerns were addressed to the best of my ability. The patient is in agreement with the listed treatment plan.
I personally conducted a physical examination of the patient. I personally gathered the patient's history. I edited the above listed findings which were prepared by the listed resident physician. I personally discussed the plan of care with the patient. The questions and concerns were addressed to the best of my ability. The patient is in agreement with the listed treatment plan.     - d/c planning to [facility]. likely for tomorrow once obtaining insurance authorization.

## 2020-11-14 ENCOUNTER — INPATIENT (INPATIENT)
Facility: HOSPITAL | Age: 85
LOS: 8 days | Discharge: HOME | DRG: 522 | End: 2020-11-23
Attending: FAMILY MEDICINE | Admitting: GENERAL PRACTICE
Payer: MEDICARE

## 2020-11-14 VITALS
WEIGHT: 130.07 LBS | TEMPERATURE: 98 F | RESPIRATION RATE: 16 BRPM | SYSTOLIC BLOOD PRESSURE: 178 MMHG | HEART RATE: 92 BPM | DIASTOLIC BLOOD PRESSURE: 84 MMHG | OXYGEN SATURATION: 100 % | HEIGHT: 65 IN

## 2020-11-14 DIAGNOSIS — Z98.89 OTHER SPECIFIED POSTPROCEDURAL STATES: Chronic | ICD-10-CM

## 2020-11-14 DIAGNOSIS — Z90.49 ACQUIRED ABSENCE OF OTHER SPECIFIED PARTS OF DIGESTIVE TRACT: Chronic | ICD-10-CM

## 2020-11-14 DIAGNOSIS — S72.002A FRACTURE OF UNSPECIFIED PART OF NECK OF LEFT FEMUR, INITIAL ENCOUNTER FOR CLOSED FRACTURE: ICD-10-CM

## 2020-11-14 DIAGNOSIS — Z90.710 ACQUIRED ABSENCE OF BOTH CERVIX AND UTERUS: Chronic | ICD-10-CM

## 2020-11-14 LAB
ALBUMIN SERPL ELPH-MCNC: 3.3 G/DL — SIGNIFICANT CHANGE UP (ref 3.3–5)
ALP SERPL-CCNC: 100 U/L — SIGNIFICANT CHANGE UP (ref 40–120)
ALT FLD-CCNC: 19 U/L — SIGNIFICANT CHANGE UP (ref 12–78)
ANION GAP SERPL CALC-SCNC: 8 MMOL/L — SIGNIFICANT CHANGE UP (ref 5–17)
APTT BLD: 29.8 SEC — SIGNIFICANT CHANGE UP (ref 27.5–35.5)
AST SERPL-CCNC: 29 U/L — SIGNIFICANT CHANGE UP (ref 15–37)
BASOPHILS # BLD AUTO: 0.03 K/UL — SIGNIFICANT CHANGE UP (ref 0–0.2)
BASOPHILS NFR BLD AUTO: 0.2 % — SIGNIFICANT CHANGE UP (ref 0–2)
BILIRUB SERPL-MCNC: 0.5 MG/DL — SIGNIFICANT CHANGE UP (ref 0.2–1.2)
BLD GP AB SCN SERPL QL: SIGNIFICANT CHANGE UP
BUN SERPL-MCNC: 19 MG/DL — SIGNIFICANT CHANGE UP (ref 7–23)
CALCIUM SERPL-MCNC: 9 MG/DL — SIGNIFICANT CHANGE UP (ref 8.5–10.1)
CHLORIDE SERPL-SCNC: 104 MMOL/L — SIGNIFICANT CHANGE UP (ref 96–108)
CO2 SERPL-SCNC: 26 MMOL/L — SIGNIFICANT CHANGE UP (ref 22–31)
CREAT SERPL-MCNC: 0.77 MG/DL — SIGNIFICANT CHANGE UP (ref 0.5–1.3)
EOSINOPHIL # BLD AUTO: 0.02 K/UL — SIGNIFICANT CHANGE UP (ref 0–0.5)
EOSINOPHIL NFR BLD AUTO: 0.1 % — SIGNIFICANT CHANGE UP (ref 0–6)
GLUCOSE SERPL-MCNC: 102 MG/DL — HIGH (ref 70–99)
HCT VFR BLD CALC: 39.7 % — SIGNIFICANT CHANGE UP (ref 34.5–45)
HGB BLD-MCNC: 13.5 G/DL — SIGNIFICANT CHANGE UP (ref 11.5–15.5)
IMM GRANULOCYTES NFR BLD AUTO: 0.4 % — SIGNIFICANT CHANGE UP (ref 0–1.5)
INR BLD: 1.09 RATIO — SIGNIFICANT CHANGE UP (ref 0.88–1.16)
LYMPHOCYTES # BLD AUTO: 1.51 K/UL — SIGNIFICANT CHANGE UP (ref 1–3.3)
LYMPHOCYTES # BLD AUTO: 8.4 % — LOW (ref 13–44)
MCHC RBC-ENTMCNC: 30.7 PG — SIGNIFICANT CHANGE UP (ref 27–34)
MCHC RBC-ENTMCNC: 34 GM/DL — SIGNIFICANT CHANGE UP (ref 32–36)
MCV RBC AUTO: 90.2 FL — SIGNIFICANT CHANGE UP (ref 80–100)
MONOCYTES # BLD AUTO: 1.38 K/UL — HIGH (ref 0–0.9)
MONOCYTES NFR BLD AUTO: 7.7 % — SIGNIFICANT CHANGE UP (ref 2–14)
NEUTROPHILS # BLD AUTO: 14.86 K/UL — HIGH (ref 1.8–7.4)
NEUTROPHILS NFR BLD AUTO: 83.2 % — HIGH (ref 43–77)
NRBC # BLD: 0 /100 WBCS — SIGNIFICANT CHANGE UP (ref 0–0)
PLATELET # BLD AUTO: 194 K/UL — SIGNIFICANT CHANGE UP (ref 150–400)
POTASSIUM SERPL-MCNC: 4.4 MMOL/L — SIGNIFICANT CHANGE UP (ref 3.5–5.3)
POTASSIUM SERPL-SCNC: 4.4 MMOL/L — SIGNIFICANT CHANGE UP (ref 3.5–5.3)
PROT SERPL-MCNC: 7.9 G/DL — SIGNIFICANT CHANGE UP (ref 6–8.3)
PROTHROM AB SERPL-ACNC: 12.7 SEC — SIGNIFICANT CHANGE UP (ref 10.6–13.6)
RBC # BLD: 4.4 M/UL — SIGNIFICANT CHANGE UP (ref 3.8–5.2)
RBC # FLD: 13.7 % — SIGNIFICANT CHANGE UP (ref 10.3–14.5)
SARS-COV-2 RNA SPEC QL NAA+PROBE: SIGNIFICANT CHANGE UP
SODIUM SERPL-SCNC: 138 MMOL/L — SIGNIFICANT CHANGE UP (ref 135–145)
WBC # BLD: 17.87 K/UL — HIGH (ref 3.8–10.5)
WBC # FLD AUTO: 17.87 K/UL — HIGH (ref 3.8–10.5)

## 2020-11-14 PROCEDURE — 72192 CT PELVIS W/O DYE: CPT | Mod: 26

## 2020-11-14 PROCEDURE — 73552 X-RAY EXAM OF FEMUR 2/>: CPT | Mod: 26,LT

## 2020-11-14 PROCEDURE — 72125 CT NECK SPINE W/O DYE: CPT | Mod: 26

## 2020-11-14 PROCEDURE — 99285 EMERGENCY DEPT VISIT HI MDM: CPT

## 2020-11-14 PROCEDURE — 71045 X-RAY EXAM CHEST 1 VIEW: CPT | Mod: 26

## 2020-11-14 PROCEDURE — 99221 1ST HOSP IP/OBS SF/LOW 40: CPT | Mod: AI

## 2020-11-14 PROCEDURE — 70450 CT HEAD/BRAIN W/O DYE: CPT | Mod: 26

## 2020-11-14 PROCEDURE — 70486 CT MAXILLOFACIAL W/O DYE: CPT | Mod: 26

## 2020-11-14 PROCEDURE — 73502 X-RAY EXAM HIP UNI 2-3 VIEWS: CPT | Mod: 26,LT

## 2020-11-14 PROCEDURE — 93010 ELECTROCARDIOGRAM REPORT: CPT

## 2020-11-14 RX ORDER — METOPROLOL TARTRATE 50 MG
50 TABLET ORAL AT BEDTIME
Refills: 0 | Status: DISCONTINUED | OUTPATIENT
Start: 2020-11-14 | End: 2020-11-16

## 2020-11-14 RX ORDER — ACETAMINOPHEN 500 MG
650 TABLET ORAL EVERY 6 HOURS
Refills: 0 | Status: DISCONTINUED | OUTPATIENT
Start: 2020-11-14 | End: 2020-11-16

## 2020-11-14 RX ORDER — LEVOTHYROXINE SODIUM 125 MCG
75 TABLET ORAL DAILY
Refills: 0 | Status: DISCONTINUED | OUTPATIENT
Start: 2020-11-15 | End: 2020-11-16

## 2020-11-14 RX ORDER — ACETAMINOPHEN 500 MG
650 TABLET ORAL ONCE
Refills: 0 | Status: COMPLETED | OUTPATIENT
Start: 2020-11-14 | End: 2020-11-14

## 2020-11-14 RX ORDER — LACTOBACILLUS ACIDOPHILUS 100MM CELL
1 CAPSULE ORAL DAILY
Refills: 0 | Status: DISCONTINUED | OUTPATIENT
Start: 2020-11-15 | End: 2020-11-16

## 2020-11-14 RX ORDER — LIDOCAINE 4 G/100G
1 CREAM TOPICAL DAILY
Refills: 0 | Status: DISCONTINUED | OUTPATIENT
Start: 2020-11-14 | End: 2020-11-16

## 2020-11-14 RX ORDER — ESCITALOPRAM OXALATE 10 MG/1
5 TABLET, FILM COATED ORAL DAILY
Refills: 0 | Status: DISCONTINUED | OUTPATIENT
Start: 2020-11-15 | End: 2020-11-16

## 2020-11-14 RX ADMIN — Medication 650 MILLIGRAM(S): at 15:45

## 2020-11-14 RX ADMIN — LIDOCAINE 1 PATCH: 4 CREAM TOPICAL at 19:47

## 2020-11-14 RX ADMIN — Medication 50 MILLIGRAM(S): at 22:28

## 2020-11-14 RX ADMIN — Medication 650 MILLIGRAM(S): at 20:53

## 2020-11-14 RX ADMIN — Medication 650 MILLIGRAM(S): at 21:12

## 2020-11-14 RX ADMIN — Medication 650 MILLIGRAM(S): at 15:13

## 2020-11-14 NOTE — ED PROVIDER NOTE - OBJECTIVE STATEMENT
88 yo F p/w mech fall today, was walking and lost balance when she turned. Pt fell onto L side. pt also hit face on ground. no loc. Pt co L hip pain- unable to bear wt. Pt also co neck discomfort. no numb/ting/focal weak. no cp/sob/palp. no weakness / dizziness. no abd pain. no n/v/d. No recent illness. no recent trauma. no hx covid exposure / prior infxn. no agg/allev factors. No other inj or co.

## 2020-11-14 NOTE — ED ADULT NURSE NOTE - ABDOMEN
soft Skin Substitute Text: Given the location of the defect, shape of the defect and the proximity to free margins a skin substitute graft was deemed most appropriate.  The graft material was trimmed to fit the size of the defect. The graft was then placed in the primary defect and oriented appropriately.

## 2020-11-14 NOTE — H&P ADULT - HISTORY OF PRESENT ILLNESS
Pt is a 89 y/o F PMH autoimmune hepatitis, HTN, hypothyroidism, and possible dementia    She is brought in for evaluation s/p fall. Patient was trying to turn with her walker. She lost balance and fell on top of the walker. She is currently complaining of left hip pain. Denies any chest pain. denies any LOC    Cervical collar in place. Pending repeat CT C-spine.   daughter reports pt is occasionally forgetful but normally able to answer questions appropriately with some difficulty expressing. She also has history of agitation while hospitalized. She also ambulates with a walker but is still unsteady at baseline.

## 2020-11-14 NOTE — ED PROVIDER NOTE - CARE PLAN
Principal Discharge DX:	Closed fracture of left hip, initial encounter  Secondary Diagnosis:	Head injuries, initial encounter  Secondary Diagnosis:	Cervical spine pain

## 2020-11-14 NOTE — H&P ADULT - NSHPLABSRESULTS_GEN_ALL_CORE
wbc: 18.8  hgb 13.5  plt: 194  Na: 138  potassium: 4.4  BUN 19  Cr 0.77  glucose 102    Head CT: Redemonstration volume loss, microvascular disease, age indeterminate lacunar infarcts of the acute hemorrhage or midline shift. If symptoms persist consider follow-up head CT or MRI if no contraindications.    Maxillofacial CT: Right frontal scalp soft tissue swelling, no underlying fracture. Absent lenses cataract surgery. Lucency of residual teeth in mandible and maxilla likely ongoing dental disease.    Cervical spine limited by motion, upper C-spine visualized on maxillofacial CT with degenerative change craniocervical junction, lateral masses of C1 remain intact on C2, suggest repeat cervical spine CT when patient clinically able to hold still.

## 2020-11-14 NOTE — CONSULT NOTE ADULT - ASSESSMENT
Incomplete  A/P: 89F w/ L femoral neck fracture    Plan:    -Plan for surgical intervention for L FN fx with hemiarthroplasty vs CRPP, will book and begin preop.  -Preop labs/imaging: CBC/BMP/PT/PTT/INR/T&S/Covid/CXR/EKG.  -NPO after midnight/IVFs while NPO.  -NWB, bedrest  -DVT ppx  -Pain control prn  -Medical management, continue home meds.  -Case discussed with attending, will advise if plan changes.

## 2020-11-14 NOTE — H&P ADULT - NSHPPHYSICALEXAM_GEN_ALL_CORE
Gen: alert ox 1 to name, laying in bed   HEENT: + cervical collar, EOMI  CVS: NSR, no m/r  LUNGs: CTAB, no m/w/r  ABD: soft, nontender  EXT: no pedal edema. slightly shortened LLE. tenderness with palpation of the left hip

## 2020-11-14 NOTE — ED PROVIDER NOTE - CADM POA URETHRAL CATHETER
Generator change on 8/27/2019  Incision healing well  Normal functioning dual chamber ppm  Lead trends stable  93%AP and 100%  Remote in 3 mos and next office visit in 6 mos   No

## 2020-11-14 NOTE — H&P ADULT - ASSESSMENT
A/P:  left hip fracture s/p mechanical fall.   HTN  HLD  h/o autoimmune hepatitis  ?dementia    admit to medsurg   ortho consulted, possible OR in AM if patient/family is agreeable.   COVID pending   pain control  vitamin D and calcium   DVT proph: SCDs  EKG pending A/P:  left hip fracture s/p mechanical fall.   HTN  HLD  h/o autoimmune hepatitis  ?dementia    admit to medsurg   ortho consulted, possible OR in AM if patient/family is agreeable.   COVID pending   pain control  vitamin D and calcium   DVT proph: SCDs      EKG noted nonspecific ST depressions in V3, V4  not seen on EKG from 8/2020. Will consult cardio for clearance.   otherwise medically optimized with moderate risk pending cardio clearance

## 2020-11-14 NOTE — ED ADULT NURSE NOTE - OBJECTIVE STATEMENT
received pt in bed #15a Pt A&O c/o left leg pain s/p trip & fall while walking w/ walker Pt also w. redness to right side of face Denies LOC

## 2020-11-14 NOTE — ED PROVIDER NOTE - MUSCULOSKELETAL, MLM
Spine appears normal, mild bl paraspinal lower / mid cervical tend, range of motion is not limited, no muscle or joint tenderness except as noted

## 2020-11-14 NOTE — CONSULT NOTE ADULT - SUBJECTIVE AND OBJECTIVE BOX
Patient is a 89yFemale home -ambulator without assistive devices who presents to [facility] ED w/ a c/o of right hip pain s/p MF today while at home. Patient states that she tripped while using her walker while. It is reported that she may have hit her head during the fall. States inability to walk immediately following the injury. Denies any numbness or tingling. Reports pain in the neck and left groin. Has history of undetermined foot fracture and rib fractures after previous falls that were being treated non surgically. SHe does not see an orthopedist. No other orthopedic concerns at this time.    Autoimmune disease    Hepatitis    Hypothyroid    Essential hypertension            latex (Rash)  penicillin (Unknown)  sulfa drugs (Unknown)      PHYSICAL EXAM:  T(C): 36.9 (11-14-20 @ 14:36), Max: 36.9 (11-14-20 @ 14:36)  HR: 92 (11-14-20 @ 14:36) (92 - 92)  BP: 178/84 (11-14-20 @ 14:36) (178/84 - 178/84)  RR: 16 (11-14-20 @ 14:36) (16 - 16)  SpO2: 100% (11-14-20 @ 14:36) (100% - 100%)    Gen: NAD, Resting comfortably    LLE:  Skin intact, no erythema or ecchymosis  Leg shortened and externally rotated.   No bony tenderness to palpation  +EHL/FHL/TA/GSC  +SILT L3-S1  + DP  Compartments soft and compressible  No calf tenderness    Secondary Survey:   No TTP over bony prominences, SILT, palpable pulses, full/painless A/PROM, compartments soft. No TTP over spinous processes or paraspinal muscles at C/T/L spine. No palpable step off. No other injuries or complaints.         Patient is a 89yFemale home -ambulator without assistive devices who presents to [facility] ED w/ a c/o of right hip pain s/p MF today while at home. Patient states that she tripped while using her walker while. It is reported that she may have hit her head during the fall. States inability to walk immediately following the injury. Denies any numbness or tingling. Reports pain in the neck and left groin. Has history of undetermined foot fracture and rib fractures after previous falls that were being treated non surgically. SHe does not see an orthopedist. No other orthopedic concerns at this time.    Autoimmune disease    Hepatitis    Hypothyroid    Essential hypertension            latex (Rash)  penicillin (Unknown)  sulfa drugs (Unknown)      PHYSICAL EXAM:  T(C): 36.9 (11-14-20 @ 14:36), Max: 36.9 (11-14-20 @ 14:36)  HR: 92 (11-14-20 @ 14:36) (92 - 92)  BP: 178/84 (11-14-20 @ 14:36) (178/84 - 178/84)  RR: 16 (11-14-20 @ 14:36) (16 - 16)  SpO2: 100% (11-14-20 @ 14:36) (100% - 100%)    Gen: NAD, Resting comfortably  Pt in Cervical collar  SPINE:  Skin intact  No bony step-offs  Grossly moving all extremities  + Median/Radial/Ulnar/Musculocutaneous/Axillary nerves intact  + Hip Flexors/Quads/Hamstrings/TA/EHL/FHL/GS  SILT C5-T1  SILT L2-S1  + Radial Pulse  + DP/PT Pulses  No saddle anesthesia    LLE:  Skin intact, no erythema or ecchymosis  Leg shortened and externally rotated.   No bony tenderness to palpation  +EHL/FHL/TA/GSC  +SILT L3-S1  + DP  Compartments soft and compressible  No calf tenderness    Secondary Survey:   No TTP over bony prominences, SILT, palpable pulses, full/painless A/PROM, compartments soft. No TTP over spinous processes or paraspinal muscles at C/T/L spine. No palpable step off. No other injuries or complaints.

## 2020-11-14 NOTE — ED PROVIDER NOTE - PHYSICAL EXAMINATION
L hip with pos tend with slight ext rotation and slight shortned. Nl dist str/sens equal bl, 2+ pulses. nl cap refill.   Nl dist leg.  Pos R maxillary contusion with mild tend. no crepitus., No nasal swelling / epistaxis.

## 2020-11-15 ENCOUNTER — TRANSCRIPTION ENCOUNTER (OUTPATIENT)
Age: 85
End: 2020-11-15

## 2020-11-15 LAB
ANION GAP SERPL CALC-SCNC: 8 MMOL/L — SIGNIFICANT CHANGE UP (ref 5–17)
APTT BLD: 29.8 SEC — SIGNIFICANT CHANGE UP (ref 27.5–35.5)
BUN SERPL-MCNC: 15 MG/DL — SIGNIFICANT CHANGE UP (ref 7–23)
CALCIUM SERPL-MCNC: 8.7 MG/DL — SIGNIFICANT CHANGE UP (ref 8.5–10.1)
CHLORIDE SERPL-SCNC: 106 MMOL/L — SIGNIFICANT CHANGE UP (ref 96–108)
CK MB BLD-MCNC: 4.9 % — HIGH (ref 0–3.5)
CK MB CFR SERPL CALC: 6 NG/ML — HIGH (ref 0–3.6)
CK SERPL-CCNC: 122 U/L — SIGNIFICANT CHANGE UP (ref 26–192)
CO2 SERPL-SCNC: 27 MMOL/L — SIGNIFICANT CHANGE UP (ref 22–31)
CREAT SERPL-MCNC: 0.66 MG/DL — SIGNIFICANT CHANGE UP (ref 0.5–1.3)
GLUCOSE SERPL-MCNC: 110 MG/DL — HIGH (ref 70–99)
HCT VFR BLD CALC: 38.3 % — SIGNIFICANT CHANGE UP (ref 34.5–45)
HGB BLD-MCNC: 13.2 G/DL — SIGNIFICANT CHANGE UP (ref 11.5–15.5)
INR BLD: 1.14 RATIO — SIGNIFICANT CHANGE UP (ref 0.88–1.16)
MCHC RBC-ENTMCNC: 30.6 PG — SIGNIFICANT CHANGE UP (ref 27–34)
MCHC RBC-ENTMCNC: 34.5 GM/DL — SIGNIFICANT CHANGE UP (ref 32–36)
MCV RBC AUTO: 88.9 FL — SIGNIFICANT CHANGE UP (ref 80–100)
NRBC # BLD: 0 /100 WBCS — SIGNIFICANT CHANGE UP (ref 0–0)
PLATELET # BLD AUTO: 176 K/UL — SIGNIFICANT CHANGE UP (ref 150–400)
POTASSIUM SERPL-MCNC: 3.7 MMOL/L — SIGNIFICANT CHANGE UP (ref 3.5–5.3)
POTASSIUM SERPL-SCNC: 3.7 MMOL/L — SIGNIFICANT CHANGE UP (ref 3.5–5.3)
PROTHROM AB SERPL-ACNC: 13.2 SEC — SIGNIFICANT CHANGE UP (ref 10.6–13.6)
RBC # BLD: 4.31 M/UL — SIGNIFICANT CHANGE UP (ref 3.8–5.2)
RBC # FLD: 13.6 % — SIGNIFICANT CHANGE UP (ref 10.3–14.5)
SODIUM SERPL-SCNC: 141 MMOL/L — SIGNIFICANT CHANGE UP (ref 135–145)
TROPONIN I SERPL-MCNC: 0.64 NG/ML — HIGH (ref 0.01–0.04)
TROPONIN I SERPL-MCNC: 0.82 NG/ML — HIGH (ref 0.01–0.04)
TROPONIN I SERPL-MCNC: 1.06 NG/ML — HIGH (ref 0.01–0.04)
WBC # BLD: 13.29 K/UL — HIGH (ref 3.8–10.5)
WBC # FLD AUTO: 13.29 K/UL — HIGH (ref 3.8–10.5)

## 2020-11-15 PROCEDURE — 93970 EXTREMITY STUDY: CPT | Mod: 26

## 2020-11-15 PROCEDURE — 72125 CT NECK SPINE W/O DYE: CPT | Mod: 26

## 2020-11-15 PROCEDURE — 99233 SBSQ HOSP IP/OBS HIGH 50: CPT

## 2020-11-15 RX ORDER — SODIUM CHLORIDE 9 MG/ML
1000 INJECTION, SOLUTION INTRAVENOUS
Refills: 0 | Status: DISCONTINUED | OUTPATIENT
Start: 2020-11-15 | End: 2020-11-16

## 2020-11-15 RX ORDER — HEPARIN SODIUM 5000 [USP'U]/ML
5000 INJECTION INTRAVENOUS; SUBCUTANEOUS EVERY 8 HOURS
Refills: 0 | Status: COMPLETED | OUTPATIENT
Start: 2020-11-15 | End: 2020-11-15

## 2020-11-15 RX ADMIN — Medication 50 MILLIGRAM(S): at 22:23

## 2020-11-15 RX ADMIN — LIDOCAINE 1 PATCH: 4 CREAM TOPICAL at 12:20

## 2020-11-15 RX ADMIN — Medication 650 MILLIGRAM(S): at 06:39

## 2020-11-15 RX ADMIN — LIDOCAINE 1 PATCH: 4 CREAM TOPICAL at 06:34

## 2020-11-15 RX ADMIN — Medication 1 TABLET(S): at 12:20

## 2020-11-15 RX ADMIN — Medication 650 MILLIGRAM(S): at 07:37

## 2020-11-15 RX ADMIN — Medication 1 TABLET(S): at 06:34

## 2020-11-15 RX ADMIN — HEPARIN SODIUM 5000 UNIT(S): 5000 INJECTION INTRAVENOUS; SUBCUTANEOUS at 22:22

## 2020-11-15 RX ADMIN — LIDOCAINE 1 PATCH: 4 CREAM TOPICAL at 22:26

## 2020-11-15 RX ADMIN — LIDOCAINE 1 PATCH: 4 CREAM TOPICAL at 06:35

## 2020-11-15 RX ADMIN — ESCITALOPRAM OXALATE 5 MILLIGRAM(S): 10 TABLET, FILM COATED ORAL at 12:20

## 2020-11-15 RX ADMIN — Medication 1 TABLET(S): at 17:19

## 2020-11-15 RX ADMIN — Medication 75 MICROGRAM(S): at 06:57

## 2020-11-15 NOTE — PROGRESS NOTE ADULT - SUBJECTIVE AND OBJECTIVE BOX
FULL NOTE TO FOLLOW    Name: JOESPH MAYBERRY  MRN: [id number]  LOCATION: [address]    ----  Patient is a 89y old  Female who presents with a chief complaint of s/p fall (15 Nov 2020 07:29)      FROM ADMISSION H+P:   HPI:  Pt is a 88 y/o F PMH autoimmune hepatitis, HTN, hypothyroidism, and possible dementia    She is brought in for evaluation s/p fall. Patient was trying to turn with her walker. She lost balance and fell on top of the walker. She is currently complaining of left hip pain. Denies any chest pain. denies any LOC    ----  INTERVAL HPI/OVERNIGHT EVENTS: Pt seen and evaluated at the bedside. No acute overnight events occurred.     ----  PAST MEDICAL & SURGICAL HISTORY:  Hepatitis  autoimmune hepatitis 20 years ago    Hypothyroid    Essential hypertension    H/O abdominal hysterectomy    History of cholecystectomy    S/P appy        FAMILY HISTORY:  Family history of stroke    Family history of heart disease        Allergies    latex (Rash)  penicillin (Unknown)  sulfa drugs (Unknown)    Intolerances        ----  ANTIMICROBIALS:    CARDIOVASCULAR:  metoprolol succinate ER 50 milliGRAM(s) Oral at bedtime    GASTROINTESTINAL:    PULMONARY:      ----    T(C): 36.8 (11-15-20 @ 05:12), Max: 36.9 (11-14-20 @ 14:36)  HR: 83 (11-15-20 @ 05:12) (83 - 92)  BP: 141/68 (11-15-20 @ 05:12) (135/71 - 181/85)  RR: 17 (11-15-20 @ 05:12) (16 - 17)  SpO2: 92% (11-15-20 @ 05:12) (92% - 100%)  Wt(kg): --    ----  INTAKE & OUTPUT:  I&O's Summary      LABS:                        13.2   13.29 )-----------( 176      ( 15 Nov 2020 05:46 )             38.3     11-15    141  |  106  |  15  ----------------------------<  110<H>  3.7   |  27  |  0.66    Ca    8.7      15 Nov 2020 05:46    TPro  7.9  /  Alb  3.3  /  TBili  0.5  /  DBili  x   /  AST  29  /  ALT  19  /  AlkPhos  100  11-14    PT/INR - ( 15 Nov 2020 05:46 )   PT: 13.2 sec;   INR: 1.14 ratio         PTT - ( 15 Nov 2020 05:46 )  PTT:29.8 sec    CAPILLARY BLOOD GLUCOSE              ----  Personally reviewed:  Vital sign trends: [  ] yes    [  ] no     [  ] n/a  Laboratory results: [  ] yes    [  ] no     [  ] n/a  Radiology results: [  ] yes    [  ] no     [  ] n/a  Culture results: [  ] yes    [  ] no     [  ] n/a  Consultant recommendations: [  ] yes    [  ] no     [  ] n/a         FULL NOTE TO FOLLOW    Name: JOESPH MAYBERRY  MRN: [id number]  LOCATION: [address]    ----  Patient is a 89y old  Female who presents with a chief complaint of s/p fall (15 Nov 2020 07:29)      FROM ADMISSION H+P:   HPI:  Pt is a 89 y/o F PMH autoimmune hepatitis, HTN, hypothyroidism, and possible dementia    She is brought in for evaluation s/p fall. Patient was trying to turn with her walker. She lost balance and fell on top of the walker. She is currently complaining of left hip pain. Denies any chest pain. denies any LOC    ----  INTERVAL HPI/OVERNIGHT EVENTS: Pt seen and evaluated at the bedside. No acute overnight events occurred. Pt is a limted historian given that hse is verbal but is AAOx1 andis unable to provide any significantly relevant history even at baseline. Dneies CP, palpitations. Denies left hip pain.     ----  PAST MEDICAL & SURGICAL HISTORY:  Hepatitis  autoimmune hepatitis 20 years ago    Hypothyroid    Essential hypertension    H/O abdominal hysterectomy    History of cholecystectomy    S/P appy        FAMILY HISTORY:  Family history of stroke    Family history of heart disease        Allergies    latex (Rash)  penicillin (Unknown)  sulfa drugs (Unknown)    Intolerances        ----  ANTIMICROBIALS:    CARDIOVASCULAR:  metoprolol succinate ER 50 milliGRAM(s) Oral at bedtime    GASTROINTESTINAL:    PULMONARY:      ----  REVIEW OF SYSTEMS:  obtainable ROS as per HPI     ----  PHYSICAL EXAM:  GENERAL: patient appears debilitated, comfortable, elderly, frail, no acute distress, appears stated age  ENMT: oropharynx clear without erythema, moist mucous membranes  LUNGS: reduced air entry bilaterally, clear to auscultation, symmetric breath sounds, no wheezing or rhonchi appreciated  HEART: soft S1/S2, regular rate and rhythm, no murmurs noted, no noted edema to b/l LE  GASTROINTESTINAL: abdomen is soft, nontender, nondistended, normoactive bowel sounds, no palpable masses  MUSCULOSKELETAL: no clubbing or cyanosis, short L leg, externally rotated  NEUROLOGIC: awake, alert     T(C): 36.8 (11-15-20 @ 05:12), Max: 36.9 (11-14-20 @ 14:36)  HR: 83 (11-15-20 @ 05:12) (83 - 92)  BP: 141/68 (11-15-20 @ 05:12) (135/71 - 181/85)  RR: 17 (11-15-20 @ 05:12) (16 - 17)  SpO2: 92% (11-15-20 @ 05:12) (92% - 100%)  Wt(kg): --    ----  INTAKE & OUTPUT:  I&O's Summary      LABS:                        13.2   13.29 )-----------( 176      ( 15 Nov 2020 05:46 )             38.3     11-15    141  |  106  |  15  ----------------------------<  110<H>  3.7   |  27  |  0.66    Ca    8.7      15 Nov 2020 05:46    TPro  7.9  /  Alb  3.3  /  TBili  0.5  /  DBili  x   /  AST  29  /  ALT  19  /  AlkPhos  100  11-14    PT/INR - ( 15 Nov 2020 05:46 )   PT: 13.2 sec;   INR: 1.14 ratio         PTT - ( 15 Nov 2020 05:46 )  PTT:29.8 sec    CAPILLARY BLOOD GLUCOSE

## 2020-11-15 NOTE — PROGRESS NOTE ADULT - ASSESSMENT
A/P: 89F w/ L femoral neck fracture    Plan:    -Plan for surgical intervention for L FN fx with hemiarthroplasty vs CRPP  -Found to have elevated troponins overnight   -Complaining of L calf tenderness FU BL LE Dopp  -Preoped and plan for OR Today pending cards clearance  -Please document cards clearance   -Preop labs/imaging: CBC/BMP/PT/PTT/INR/T&S/Covid/CXR/EKG.  -NPO except meds  -IVFs while NPO  -NWB, bedrest  -DVT ppx  -Pain control prn  -Medical management, continue home meds.  -Case discussed with attending, will advise if plan changes.

## 2020-11-15 NOTE — CONSULT NOTE ADULT - PROVIDER SPECIALTY LIST ADULT
Cardiology dysphagia 2, mechanical soft, thin liquids/DASH/TLC (sodium and cholesterol restricted diet)

## 2020-11-15 NOTE — CONSULT NOTE ADULT - SUBJECTIVE AND OBJECTIVE BOX
History of Present Illness: The patient is an 89 year old female with a history of HTN, hypothyroidism, dementia who presents with fall. The patient is a poor historian due to dementia and unable to recall the events surrounding her fall. She has no current complaints except for leg pain. She denies chest pain, shortness of breath, dizziness, palpitations. She was found to have a hip fracture and plan is for surgery.    Past Medical/Surgical History:  HTN, hypothyroidism, dementia    Medications:  Home Medications:  Acidophilus oral capsule: 1 cap(s) orally once a day (04 Aug 2020 13:38)  aspirin 81 mg oral tablet: 1 tab(s) orally once a day (04 Aug 2020 13:38)  Lexapro 5 mg oral tablet: 1 tab(s) orally once a day (04 Aug 2020 13:38)  metoprolol succinate 50 mg oral tablet, extended release: 1 tab(s) orally once a day (04 Aug 2020 13:38)  Synthroid 75 mcg (0.075 mg) oral tablet: 1 tab(s) orally once a day (04 Aug 2020 13:38)      Family History: Non-contributory family history of premature cardiovascular atherosclerotic disease    Social History: No tobacco, alcohol or drug use    Review of Systems:  General: No fevers, chills, weight loss or gain  Skin: No rashes, color changes  Cardiovascular: No chest pain, orthopnea  Respiratory: No shortness of breath, cough  Gastrointestinal: No nausea, abdominal pain  Genitourinary: No incontinence, pain with urination  Musculoskeletal: No pain, swelling, decreased range of motion  Neurological: No headache, weakness  Psychiatric: No depression, anxiety  Endocrine: No weight loss or gain, increased thirst  All other systems are comprehensively negative.    Physical Exam:  Vitals:        Vital Signs Last 24 Hrs  T(C): 36.8 (15 Nov 2020 05:12), Max: 36.9 (14 Nov 2020 14:36)  T(F): 98.2 (15 Nov 2020 05:12), Max: 98.4 (14 Nov 2020 14:36)  HR: 83 (15 Nov 2020 05:12) (83 - 92)  BP: 141/68 (15 Nov 2020 05:12) (135/71 - 181/85)  BP(mean): --  RR: 17 (15 Nov 2020 05:12) (16 - 17)  SpO2: 92% (15 Nov 2020 05:12) (92% - 100%)  General: NAD  HEENT: MMM  Neck: No JVD, no carotid bruit  Lungs: CTAB  CV: RRR, nl S1/S2, no M/R/G  Abdomen: S/NT/ND, +BS  Extremities: No LE edema, no cyanosis  Neuro: AAOx3, non-focal  Skin: No rash    Labs:                        13.2   13.29 )-----------( 176      ( 15 Nov 2020 05:46 )             38.3     11-15    141  |  106  |  15  ----------------------------<  110<H>  3.7   |  27  |  0.66    Ca    8.7      15 Nov 2020 05:46    TPro  7.9  /  Alb  3.3  /  TBili  0.5  /  DBili  x   /  AST  29  /  ALT  19  /  AlkPhos  100  11-14    CARDIAC MARKERS ( 15 Nov 2020 05:46 )  .643 ng/mL / x     / 122 U/L / x     / 6.0 ng/mL  CARDIAC MARKERS ( 14 Nov 2020 15:18 )  <.015 ng/mL / x     / 70 U/L / x     / 2.1 ng/mL      PT/INR - ( 15 Nov 2020 05:46 )   PT: 13.2 sec;   INR: 1.14 ratio         PTT - ( 15 Nov 2020 05:46 )  PTT:29.8 sec    ECG: Sinus tachycardia, LAD, nonspecific anterior ST abnormality

## 2020-11-15 NOTE — CONSULT NOTE ADULT - ASSESSMENT
The patient is an 89 year old female with a history of HTN, hypothyroidism, dementia who is admitted with hip fracture.    Plan:  - ECG with new nonspecific anterior ST abnormality, new compared to ECG from 8/20  - No symptoms suggestive of MI or PE, although patient is a poor historian  - Troponin elevated to 0.643 possibly in the setting of demand ischemia from tachycardia, hypertension, and stress from fracture. Less likely ACS. Cannot exclude PE.  - Repeat enzymes in 6 hours  - Check BNP  - Check echocardiogram  - Check LE venous duplex  - Continue aspirin 81 mg daily  - Continue metoprolol succinate 50 mg daily  - If cardiac enzymes trending down and ultrasound studies unremarkable, then patient may proceed from a cardiac standpoint for urgent hip surgery The patient is an 89 year old female with a history of HTN, hypothyroidism, dementia who is admitted with hip fracture.    Plan:  - ECG with new nonspecific anterior ST abnormality, new compared to ECG from 8/20  - No symptoms suggestive of MI or PE, although patient is a poor historian  - Troponin elevated to 0.643 possibly in the setting of demand ischemia from tachycardia, hypertension, and stress from fracture. Less likely ACS. Cannot exclude PE.  - Repeat enzymes in 6 hours  - Check BNP  - Check echocardiogram  - Check LE venous duplex  - Continue aspirin 81 mg daily  - Continue metoprolol succinate 50 mg daily  - If cardiac enzymes trending down and ultrasound studies unremarkable, then patient may proceed from a cardiac standpoint for urgent hip surgery    ADDENDUM:  - Echo with normal LV systolic function, mild MS, mod AR, mod TR, mod pulm HTN  - LE venous duplex negative for DVT  - BNP elevated at 7915 but no evidence of decompensated heart failure on exam  - If next cardiac enzymes not significantly higher, patient may proceed for urgent surgery The patient is an 89 year old female with a history of HTN, hypothyroidism, dementia who is admitted with hip fracture.    Plan:  - ECG with new nonspecific anterior ST abnormality, new compared to ECG from 8/20  - No symptoms suggestive of MI or PE, although patient is a poor historian  - Troponin elevated to 0.643 possibly in the setting of demand ischemia from tachycardia, hypertension, and stress from fracture. Less likely ACS. Cannot exclude PE.  - Repeat enzymes in 6 hours  - Check BNP  - Check echocardiogram  - Check LE venous duplex  - Continue aspirin 81 mg daily  - Continue metoprolol succinate 50 mg daily  - If cardiac enzymes not significantly trending up and ultrasound studies unremarkable, then patient may proceed from a cardiac standpoint for urgent hip surgery    ADDENDUM:  - Echo with normal LV systolic function, mild MS, mod AR, mod TR, mod pulm HTN  - LE venous duplex negative for DVT  - BNP elevated at 7915 but no evidence of decompensated heart failure on exam  - Repeat troponin with an insignificant trend up to 0.818  - Patient is optimized from a cardiac perspective to proceed for surgery

## 2020-11-15 NOTE — PROGRESS NOTE ADULT - SUBJECTIVE AND OBJECTIVE BOX
Patient seen and examined at bedside this AM. Reports she has continued pain in the hip. Denies any CP/SOB/Numbness/Tingling at this time. While at bedside this AM, informed by nurse about elevated troponins.     PHYSICAL EXAM:  Vital Signs Last 24 Hrs  T(C): 36.8 (15 Nov 2020 05:12), Max: 36.9 (14 Nov 2020 14:36)  T(F): 98.2 (15 Nov 2020 05:12), Max: 98.4 (14 Nov 2020 14:36)  HR: 83 (15 Nov 2020 05:12) (83 - 92)  BP: 141/68 (15 Nov 2020 05:12) (135/71 - 181/85)  BP(mean): --  RR: 17 (15 Nov 2020 05:12) (16 - 17)  SpO2: 92% (15 Nov 2020 05:12) (92% - 100%)    Gen: NAD, Resting comfortably  LLE:  Skin intact, no erythema or ecchymosis  Leg shortened and externally rotated.   No bony tenderness to palpation  +EHL/FHL/TA/GSC  +SILT L3-S1  + DP  Compartments soft and compressible  Left calf tenderness

## 2020-11-15 NOTE — PROGRESS NOTE ADULT - ASSESSMENT
88 y/o F PMH autoimmune hepatitis, HTN, hypothyroidism, and possible dementia presents w/ mechanical fall, found to have L hip fx, planned for OR w/ orthopedics    #left subcapital hip fx s/p mechanical fall.   - planning for OR with orthopedics  - pain control  - VTE ppx post-op  - PT eval post-op  - neurovascular checks as indicated  - bedrest for now  - NPO. will add gentle IVF if case is cancelled  - vitamin D and calcium     #ischemic demand  - pt suffered fall, had abnormal EKG, enzymes elevated  - TTE reviewed, reviewed cardiology recommendations  - plan to repeat cardiac enzymes, if downtrending without any new evidence of ischemia, will plan for OR later today    #pre-op examination  - will add addendum to this note once next set of cardiac enzymes is available. timed for noon today.     #leukocytosis likely stress induced 2/2 fall  - normalizing without intervention  - no evidence of infection at present    #HTN  - continue beta blocker    #HLD  - not on statin at home. only asa likely 2/2 autoimmune hepatitis    #h/o autoimmune hepatitis  - supportive care  - lft's reviewed and wnl    #hypothyroidism  - c/w synthroid    #cognitive impairment  - supportive care    #VTE ppx w/ SCD's for now  #GI ppx not indicated    Will update orthopedics with my recommendations in real time   88 y/o F PMH autoimmune hepatitis, HTN, hypothyroidism, and possible dementia presents w/ mechanical fall, found to have L hip fx, planned for OR w/ orthopedics    #left subcapital hip fx s/p mechanical fall.   - planning for OR with orthopedics  - pain control  - VTE ppx post-op  - PT eval post-op  - neurovascular checks as indicated  - bedrest for now  - NPO. will add gentle IVF if case is cancelled  - vitamin D and calcium     #ischemic demand  - pt suffered fall, had abnormal EKG, enzymes elevated  - TTE reviewed, reviewed cardiology recommendations  - plan to repeat cardiac enzymes, if downtrending without any new evidence of ischemia, will plan for OR later today    #calf pain  - venous doppler prelim reviewed: no evidence of DVT    #pre-op examination  - will add addendum to this note once next set of cardiac enzymes is available. timed for noon today.     #leukocytosis likely stress induced 2/2 fall  - normalizing without intervention  - no evidence of infection at present    #HTN  - continue beta blocker    #HLD  - not on statin at home. only asa likely 2/2 autoimmune hepatitis    #h/o autoimmune hepatitis  - supportive care  - lft's reviewed and wnl    #hypothyroidism  - c/w synthroid    #cognitive impairment  - supportive care    #VTE ppx w/ SCD's for now  #GI ppx not indicated    Will update orthopedics with my recommendations in real time

## 2020-11-16 ENCOUNTER — RESULT REVIEW (OUTPATIENT)
Age: 85
End: 2020-11-16

## 2020-11-16 ENCOUNTER — TRANSCRIPTION ENCOUNTER (OUTPATIENT)
Age: 85
End: 2020-11-16

## 2020-11-16 LAB
ALBUMIN SERPL ELPH-MCNC: 2.6 G/DL — LOW (ref 3.3–5)
ALP SERPL-CCNC: 71 U/L — SIGNIFICANT CHANGE UP (ref 40–120)
ALT FLD-CCNC: 13 U/L — SIGNIFICANT CHANGE UP (ref 12–78)
ANION GAP SERPL CALC-SCNC: 6 MMOL/L — SIGNIFICANT CHANGE UP (ref 5–17)
ANION GAP SERPL CALC-SCNC: 8 MMOL/L — SIGNIFICANT CHANGE UP (ref 5–17)
APTT BLD: 28.4 SEC — SIGNIFICANT CHANGE UP (ref 27.5–35.5)
AST SERPL-CCNC: 31 U/L — SIGNIFICANT CHANGE UP (ref 15–37)
BILIRUB SERPL-MCNC: 0.7 MG/DL — SIGNIFICANT CHANGE UP (ref 0.2–1.2)
BUN SERPL-MCNC: 14 MG/DL — SIGNIFICANT CHANGE UP (ref 7–23)
BUN SERPL-MCNC: 14 MG/DL — SIGNIFICANT CHANGE UP (ref 7–23)
CALCIUM SERPL-MCNC: 8.2 MG/DL — LOW (ref 8.5–10.1)
CALCIUM SERPL-MCNC: 8.5 MG/DL — SIGNIFICANT CHANGE UP (ref 8.5–10.1)
CHLORIDE SERPL-SCNC: 106 MMOL/L — SIGNIFICANT CHANGE UP (ref 96–108)
CHLORIDE SERPL-SCNC: 106 MMOL/L — SIGNIFICANT CHANGE UP (ref 96–108)
CO2 SERPL-SCNC: 27 MMOL/L — SIGNIFICANT CHANGE UP (ref 22–31)
CO2 SERPL-SCNC: 28 MMOL/L — SIGNIFICANT CHANGE UP (ref 22–31)
CREAT SERPL-MCNC: 0.66 MG/DL — SIGNIFICANT CHANGE UP (ref 0.5–1.3)
CREAT SERPL-MCNC: 0.73 MG/DL — SIGNIFICANT CHANGE UP (ref 0.5–1.3)
GLUCOSE SERPL-MCNC: 122 MG/DL — HIGH (ref 70–99)
GLUCOSE SERPL-MCNC: 97 MG/DL — SIGNIFICANT CHANGE UP (ref 70–99)
HCT VFR BLD CALC: 34.6 % — SIGNIFICANT CHANGE UP (ref 34.5–45)
HCT VFR BLD CALC: 35.1 % — SIGNIFICANT CHANGE UP (ref 34.5–45)
HGB BLD-MCNC: 11.9 G/DL — SIGNIFICANT CHANGE UP (ref 11.5–15.5)
HGB BLD-MCNC: 12 G/DL — SIGNIFICANT CHANGE UP (ref 11.5–15.5)
INR BLD: 1.21 RATIO — HIGH (ref 0.88–1.16)
MAGNESIUM SERPL-MCNC: 1.9 MG/DL — SIGNIFICANT CHANGE UP (ref 1.6–2.6)
MCHC RBC-ENTMCNC: 30.7 PG — SIGNIFICANT CHANGE UP (ref 27–34)
MCHC RBC-ENTMCNC: 30.9 PG — SIGNIFICANT CHANGE UP (ref 27–34)
MCHC RBC-ENTMCNC: 34.2 GM/DL — SIGNIFICANT CHANGE UP (ref 32–36)
MCHC RBC-ENTMCNC: 34.4 GM/DL — SIGNIFICANT CHANGE UP (ref 32–36)
MCV RBC AUTO: 89.8 FL — SIGNIFICANT CHANGE UP (ref 80–100)
MCV RBC AUTO: 89.9 FL — SIGNIFICANT CHANGE UP (ref 80–100)
NRBC # BLD: 0 /100 WBCS — SIGNIFICANT CHANGE UP (ref 0–0)
NRBC # BLD: 0 /100 WBCS — SIGNIFICANT CHANGE UP (ref 0–0)
PLATELET # BLD AUTO: 144 K/UL — LOW (ref 150–400)
PLATELET # BLD AUTO: 147 K/UL — LOW (ref 150–400)
POTASSIUM SERPL-MCNC: 3.6 MMOL/L — SIGNIFICANT CHANGE UP (ref 3.5–5.3)
POTASSIUM SERPL-MCNC: 3.7 MMOL/L — SIGNIFICANT CHANGE UP (ref 3.5–5.3)
POTASSIUM SERPL-SCNC: 3.6 MMOL/L — SIGNIFICANT CHANGE UP (ref 3.5–5.3)
POTASSIUM SERPL-SCNC: 3.7 MMOL/L — SIGNIFICANT CHANGE UP (ref 3.5–5.3)
PROT SERPL-MCNC: 6.8 G/DL — SIGNIFICANT CHANGE UP (ref 6–8.3)
PROTHROM AB SERPL-ACNC: 14 SEC — HIGH (ref 10.6–13.6)
RBC # BLD: 3.85 M/UL — SIGNIFICANT CHANGE UP (ref 3.8–5.2)
RBC # BLD: 3.91 M/UL — SIGNIFICANT CHANGE UP (ref 3.8–5.2)
RBC # FLD: 13.8 % — SIGNIFICANT CHANGE UP (ref 10.3–14.5)
RBC # FLD: 14 % — SIGNIFICANT CHANGE UP (ref 10.3–14.5)
SARS-COV-2 IGG SERPL QL IA: NEGATIVE — SIGNIFICANT CHANGE UP
SARS-COV-2 IGM SERPL IA-ACNC: <0.1 INDEX — SIGNIFICANT CHANGE UP
SODIUM SERPL-SCNC: 140 MMOL/L — SIGNIFICANT CHANGE UP (ref 135–145)
SODIUM SERPL-SCNC: 141 MMOL/L — SIGNIFICANT CHANGE UP (ref 135–145)
TROPONIN I SERPL-MCNC: 0.66 NG/ML — HIGH (ref 0.01–0.04)
WBC # BLD: 12.68 K/UL — HIGH (ref 3.8–10.5)
WBC # BLD: 14.16 K/UL — HIGH (ref 3.8–10.5)
WBC # FLD AUTO: 12.68 K/UL — HIGH (ref 3.8–10.5)
WBC # FLD AUTO: 14.16 K/UL — HIGH (ref 3.8–10.5)

## 2020-11-16 PROCEDURE — 88311 DECALCIFY TISSUE: CPT | Mod: 26

## 2020-11-16 PROCEDURE — 73502 X-RAY EXAM HIP UNI 2-3 VIEWS: CPT | Mod: 26,LT

## 2020-11-16 PROCEDURE — 99233 SBSQ HOSP IP/OBS HIGH 50: CPT

## 2020-11-16 PROCEDURE — 88305 TISSUE EXAM BY PATHOLOGIST: CPT | Mod: 26

## 2020-11-16 RX ORDER — RIVAROXABAN 15 MG-20MG
10 KIT ORAL DAILY
Refills: 0 | Status: DISCONTINUED | OUTPATIENT
Start: 2020-11-17 | End: 2020-11-23

## 2020-11-16 RX ORDER — BUPIVACAINE 13.3 MG/ML
20 INJECTION, SUSPENSION, LIPOSOMAL INFILTRATION ONCE
Refills: 0 | Status: DISCONTINUED | OUTPATIENT
Start: 2020-11-16 | End: 2020-11-16

## 2020-11-16 RX ORDER — ACETAMINOPHEN 500 MG
975 TABLET ORAL EVERY 8 HOURS
Refills: 0 | Status: DISCONTINUED | OUTPATIENT
Start: 2020-11-16 | End: 2020-11-23

## 2020-11-16 RX ORDER — ESCITALOPRAM OXALATE 10 MG/1
5 TABLET, FILM COATED ORAL DAILY
Refills: 0 | Status: DISCONTINUED | OUTPATIENT
Start: 2020-11-16 | End: 2020-11-23

## 2020-11-16 RX ORDER — OXYCODONE HYDROCHLORIDE 5 MG/1
5 TABLET ORAL EVERY 4 HOURS
Refills: 0 | Status: DISCONTINUED | OUTPATIENT
Start: 2020-11-16 | End: 2020-11-18

## 2020-11-16 RX ORDER — ONDANSETRON 8 MG/1
4 TABLET, FILM COATED ORAL EVERY 6 HOURS
Refills: 0 | Status: DISCONTINUED | OUTPATIENT
Start: 2020-11-16 | End: 2020-11-23

## 2020-11-16 RX ORDER — NALOXONE HYDROCHLORIDE 4 MG/.1ML
0.1 SPRAY NASAL ONCE
Refills: 0 | Status: DISCONTINUED | OUTPATIENT
Start: 2020-11-16 | End: 2020-11-16

## 2020-11-16 RX ORDER — LEVOTHYROXINE SODIUM 125 MCG
75 TABLET ORAL DAILY
Refills: 0 | Status: DISCONTINUED | OUTPATIENT
Start: 2020-11-16 | End: 2020-11-23

## 2020-11-16 RX ORDER — OXYCODONE HYDROCHLORIDE 5 MG/1
2.5 TABLET ORAL EVERY 4 HOURS
Refills: 0 | Status: DISCONTINUED | OUTPATIENT
Start: 2020-11-16 | End: 2020-11-16

## 2020-11-16 RX ORDER — ONDANSETRON 8 MG/1
4 TABLET, FILM COATED ORAL ONCE
Refills: 0 | Status: DISCONTINUED | OUTPATIENT
Start: 2020-11-16 | End: 2020-11-16

## 2020-11-16 RX ORDER — HYDROMORPHONE HYDROCHLORIDE 2 MG/ML
0.5 INJECTION INTRAMUSCULAR; INTRAVENOUS; SUBCUTANEOUS
Refills: 0 | Status: DISCONTINUED | OUTPATIENT
Start: 2020-11-16 | End: 2020-11-16

## 2020-11-16 RX ORDER — SENNA PLUS 8.6 MG/1
2 TABLET ORAL AT BEDTIME
Refills: 0 | Status: DISCONTINUED | OUTPATIENT
Start: 2020-11-16 | End: 2020-11-23

## 2020-11-16 RX ORDER — POLYETHYLENE GLYCOL 3350 17 G/17G
17 POWDER, FOR SOLUTION ORAL DAILY
Refills: 0 | Status: DISCONTINUED | OUTPATIENT
Start: 2020-11-16 | End: 2020-11-23

## 2020-11-16 RX ORDER — LACTOBACILLUS ACIDOPHILUS 100MM CELL
1 CAPSULE ORAL DAILY
Refills: 0 | Status: DISCONTINUED | OUTPATIENT
Start: 2020-11-16 | End: 2020-11-23

## 2020-11-16 RX ORDER — METOPROLOL TARTRATE 50 MG
50 TABLET ORAL AT BEDTIME
Refills: 0 | Status: DISCONTINUED | OUTPATIENT
Start: 2020-11-16 | End: 2020-11-23

## 2020-11-16 RX ORDER — SODIUM CHLORIDE 9 MG/ML
1000 INJECTION, SOLUTION INTRAVENOUS
Refills: 0 | Status: DISCONTINUED | OUTPATIENT
Start: 2020-11-16 | End: 2020-11-16

## 2020-11-16 RX ORDER — OXYCODONE HYDROCHLORIDE 5 MG/1
5 TABLET ORAL ONCE
Refills: 0 | Status: DISCONTINUED | OUTPATIENT
Start: 2020-11-16 | End: 2020-11-16

## 2020-11-16 RX ORDER — LANOLIN ALCOHOL/MO/W.PET/CERES
3 CREAM (GRAM) TOPICAL AT BEDTIME
Refills: 0 | Status: DISCONTINUED | OUTPATIENT
Start: 2020-11-16 | End: 2020-11-23

## 2020-11-16 RX ADMIN — SODIUM CHLORIDE 75 MILLILITER(S): 9 INJECTION, SOLUTION INTRAVENOUS at 00:00

## 2020-11-16 RX ADMIN — Medication 75 MICROGRAM(S): at 06:16

## 2020-11-16 RX ADMIN — SENNA PLUS 2 TABLET(S): 8.6 TABLET ORAL at 21:33

## 2020-11-16 RX ADMIN — Medication 100 MILLIGRAM(S): at 19:50

## 2020-11-16 RX ADMIN — LIDOCAINE 1 PATCH: 4 CREAM TOPICAL at 00:01

## 2020-11-16 RX ADMIN — Medication 50 MILLIGRAM(S): at 21:33

## 2020-11-16 RX ADMIN — SODIUM CHLORIDE 75 MILLILITER(S): 9 INJECTION, SOLUTION INTRAVENOUS at 14:45

## 2020-11-16 NOTE — DISCHARGE NOTE PROVIDER - NSDCMRMEDTOKEN_GEN_ALL_CORE_FT
Acidophilus oral capsule: 1 cap(s) orally once a day  aspirin 81 mg oral tablet: 1 tab(s) orally once a day  Lexapro 5 mg oral tablet: 1 tab(s) orally once a day  metoprolol succinate 50 mg oral tablet, extended release: 1 tab(s) orally once a day  Synthroid 75 mcg (0.075 mg) oral tablet: 1 tab(s) orally once a day   acetaminophen 325 mg oral tablet: 3 tab(s) orally every 8 hours, As needed, Temp greater or equal to 38C (100.4F), Mild Pain (1 - 3)  Acidophilus oral capsule: 1 cap(s) orally once a day  aspirin 81 mg oral tablet: 1 tab(s) orally once a day  calcium-vitamin D 500 mg-200 intl units (5 mcg) oral tablet: 1 tab(s) orally 2 times a day  Lexapro 5 mg oral tablet: 1 tab(s) orally once a day  metoprolol succinate 50 mg oral tablet, extended release: 1 tab(s) orally once a day  oxyCODONE 5 mg oral tablet: 1 tab(s) orally every 4 hours, As needed, Severe Pain (7 - 10)  polyethylene glycol 3350 oral powder for reconstitution: 17 gram(s) orally once a day  rivaroxaban 10 mg oral tablet: 1 tab(s) orally once a day; to complete 35 days started on 11/17/20  senna oral tablet: 2 tab(s) orally once a day (at bedtime)  Synthroid 75 mcg (0.075 mg) oral tablet: 1 tab(s) orally once a day

## 2020-11-16 NOTE — DISCHARGE NOTE PROVIDER - CARE PROVIDER_API CALL
Cristhian Belle  ORTHOPAEDIC SURGERY  [address] [place] [address]  Phone: [phone]  Fax: [phone]  Follow Up Time:    Cristhian Belle  ORTHOPAEDIC SURGERY  [address] [place] [address]  Phone: [phone]  Fax: [phone]  Follow Up Time:     Tiburcio Godinez  INFECTIOUS DISEASE  [address], [place] [id number]  Phone: [phone]  Fax: [phone]  Follow Up Time: 1-3 days    Miguel A Prater  GASTROENTEROLOGY  [address] [facility], [address] [place] [address]  Phone: [phone]  Fax: [phone]  Follow Up Time: 1 week

## 2020-11-16 NOTE — DISCHARGE NOTE PROVIDER - NSDCFUADDAPPT_GEN_ALL_CORE_FT
- Follow up with Dr. Belle as outpatient in 10-14 days after discharge from the Hospital or Rehab. Please call office for appointment.  - Follow up with your primary care doctor, Dr. Godinez, within 1-3 days of discharge.  - Follow up with your gastroenterologist, Dr. Prater, within 1 week of discharge.

## 2020-11-16 NOTE — PROVIDER CONTACT NOTE (OTHER) - ACTION/TREATMENT ORDERED:
No new orders. Dr Grier spoke with anesthesiologist and they told him she will be still sleepy for few hours.
no new orders.

## 2020-11-16 NOTE — DISCHARGE NOTE PROVIDER - NSDCCPCAREPLAN_GEN_ALL_CORE_FT
PRINCIPAL DISCHARGE DIAGNOSIS  Diagnosis: Closed fracture of left hip, initial encounter  Assessment and Plan of Treatment:       SECONDARY DISCHARGE DIAGNOSES  Diagnosis: Cervical spine pain  Assessment and Plan of Treatment:     Diagnosis: Head injuries, initial encounter  Assessment and Plan of Treatment:      PRINCIPAL DISCHARGE DIAGNOSIS  Diagnosis: Closed fracture of left hip, initial encounter  Assessment and Plan of Treatment: You had a hemiarthroplasty of your left hip for a left hip fracture due to a fall.  - You are to continue taking your Xarelto as prescribed for SVT and PE prophylaxis for a total of 35days (you started this medication on 11/17)  1. Pain Control: take pain medications as needed and as prescribed  2. Full weight bearing as tolerated, with assistive devices (walker/cane) as needed  3. DVT Prophylaxis: as noted above  4. Physical therapy as needed  5. Follow up with Dr. Belle as outpatient in 10-14 days after discharge from the Hospital or Rehab. Please call office for appointment.  6.  Remove dressing post-op day 10, with daily dressing changes as needed. Remove staples post-op day 14.  7. Ice affected area as needed  8. Keep dressing clean and dry      SECONDARY DISCHARGE DIAGNOSES  Diagnosis: Calf pain  Assessment and Plan of Treatment: You had calf pain while in the hopital. A doppler of your legs was done.  - You and your daughter were informed of LE duplex findings and that showed no evidence of deep venous thrombosis in the visualized veins of either lower extremity.  - You are to follow-up with ultrasound in one week to reevaluate as the calf veins were incompletely visualized bilaterally.   - Follow up with your primary care doctor within 1-3 days of discharge.    Diagnosis: History of IBS  Assessment and Plan of Treatment: - Incidentally finding of Bowel Wall thickening on CT of yourPelvis was noted.  -Follow up with your gastroenterologist, Dr. Prater, within 1 week of discharge.    Diagnosis: HTN (hypertension)  Assessment and Plan of Treatment: Continue with your home medication metoprolol.    Diagnosis: Hypothyroid  Assessment and Plan of Treatment: Continue with your homemedication of synthroid

## 2020-11-16 NOTE — DISCHARGE NOTE PROVIDER - NSDCFUADDINST_GEN_ALL_CORE_FT
1. Pain Control: take pain medications as needed and as prescribed  2. Full weight bearing as tolerated, with assistive devices (walker/cane) as needed  3. DVT Prophylaxis: See Med Rec for details.  4. Physical therapy as needed  5. Follow up with Dr. Belle as outpatient in 10-14 days after discharge from the Hospital or Rehab. Please call office for appointment.  6.  Remove dressing post-op day 10, with daily dressing changes as needed. Remove staples post-op day 14.  7. Ice affected area as needed  8. Keep dressing clean and dry

## 2020-11-16 NOTE — DISCHARGE NOTE PROVIDER - HOSPITAL COURSE
FROM ADMISSION H+P:   HPI:  Pt is a 88 y/o F PMH autoimmune hepatitis, HTN, hypothyroidism, and possible dementia    She is brought in for evaluation s/p fall. Patient was trying to turn with her walker. She lost balance and fell on top of the walker. She is currently complaining of left hip pain. Denies any chest pain. denies any LOC    Cervical collar in place. Pending repeat CT C-spine.   daughter reports pt is occasionally forgetful but normally able to answer questions appropriately with some difficulty expressing. She also has history of agitation while hospitalized. She also ambulates with a walker but is still unsteady at baseline.  (14 Nov 2020 17:20)      ---  HOSPITAL COURSE:   The patient was admitted for mechanical fall causing a left hip fracture. Orthopedics was consulted and the pt has a left manas hip arthroplasty on 11/16. The patient was started on a pain control regimen and a bowel regimen. Physical therapy evaluated the patient and recommended AMAN. Patient tolerated diet well post-op. The patient was started on xarelto 10mg daily for 35 days for post-op DVT/PE prophylaxis. Within 24hrs of her operation the pt developed an isolated fever, but remained afebrile for the rest of her hospital course. Leukocytosis was noted and was likely reactive s/p left manas hip arthroplasty. Patient was not toxic appear and her incision site remained clean and dry. The patient had complaint of calf pain for which LE duplex was done and the patient's daughter was informed of LE duplex findings and that patient should have follow up LE duplex in 1 weeks time as outpatient to further evaluate the incompletely visualized calf veins. Patient was noted to have minimal thrombocytopenia which normalized throughout hospital course.      ---  CONSULTANTS:   Orthopedics  Cardiology: Dr. Ortez    ---  TIME SPENT:  The total amount of time spent reviewing the hospital notes, laboratory values, imaging findings, assessing/counseling the patient, discussing with consultant physicians, social work, nursing staff was -- minutes    ---  Primary care provider was made aware of plan for discharge:      [  ] NO     [  ] YES   FROM ADMISSION H+P:   HPI:  Pt is a 89 y/o F PMH autoimmune hepatitis, HTN, hypothyroidism, and possible dementia    She is brought in for evaluation s/p fall. Patient was trying to turn with her walker. She lost balance and fell on top of the walker. She is currently complaining of left hip pain. Denies any chest pain. denies any LOC    Cervical collar in place. Pending repeat CT C-spine.   daughter reports pt is occasionally forgetful but normally able to answer questions appropriately with some difficulty expressing. She also has history of agitation while hospitalized. She also ambulates with a walker but is still unsteady at baseline.  (14 Nov 2020 17:20)      ---  HOSPITAL COURSE:   The patient was admitted for mechanical fall causing a left hip fracture. Orthopedics was consulted and the pt has a left manas hip arthroplasty on 11/16. The patient was started on a pain control regimen and a bowel regimen. Physical therapy evaluated the patient and recommended AMAN. Patient tolerated diet well post-op. The patient was started on xarelto 10mg daily for 35 days for post-op DVT/PE prophylaxis. Within 24hrs of her operation the pt developed an isolated fever, but remained afebrile for the rest of her hospital course. Leukocytosis was noted and was likely reactive s/p left manas hip arthroplasty. Patient was not toxic appear and her incision site remained clean and dry. The patient had complaint of calf pain for which LE duplex was done and the patient's daughter was informed of LE duplex findings and that patient should have follow up LE duplex in 1 weeks time as outpatient to further evaluate the incompletely visualized calf veins. Patient was noted to have minimal thrombocytopenia which normalized throughout hospital course.      ---  CONSULTANTS:   Orthopedics  Cardiology: Dr. Ortez    ---  TIME SPENT: 65 minutes

## 2020-11-16 NOTE — PROGRESS NOTE ADULT - SUBJECTIVE AND OBJECTIVE BOX
Orthopedics Post-op Check    POD 0 L Jose Hip Arthroplasty  Pt seen and examined. Still lethargic from anesthesia. Able to respond to verbal / physical stimuli but unable to fully answer questions at this time. Will re-assess when less lethargic     Vital Signs Last 24 Hrs  T(C): 36.4 (11-16-20 @ 14:19), Max: 37.1 (11-16-20 @ 06:18)  T(F): 97.5 (11-16-20 @ 14:19), Max: 98.8 (11-16-20 @ 06:18)  HR: 61 (11-16-20 @ 15:11) (61 - 84)  BP: 113/49 (11-16-20 @ 15:11) (104/46 - 162/80)  BP(mean): --  RR: 12 (11-16-20 @ 15:11) (12 - 19)  SpO2: 100% (11-16-20 @ 15:11) (90% - 100%)                        11.9   14.16 )-----------( 144      ( 16 Nov 2020 14:50 )             34.6     16 Nov 2020 14:50    140    |  106    |  14     ----------------------------<  122    3.7     |  28     |  0.73     Ca    8.2        16 Nov 2020 14:50  Mg     1.9       16 Nov 2020 05:24    TPro  6.8    /  Alb  2.6    /  TBili  0.7    /  DBili  x      /  AST  31     /  ALT  13     /  AlkPhos  71     16 Nov 2020 05:24    PT/INR - ( 16 Nov 2020 05:24 )   PT: 14.0 sec;   INR: 1.21 ratio         PTT - ( 16 Nov 2020 05:24 )  PTT:28.4 sec    Exam:  NAD  Dressing clean and dry  +EHL FHL TA GS  Unable to assess sensation due to lethargy   +DP  Calf Soft NT

## 2020-11-16 NOTE — PROGRESS NOTE ADULT - ASSESSMENT
A/P: 89F w/ L femoral neck fracture    Plan:    -Plan for surgical intervention for L FN fx with hemiarthroplasty vs CRPP  -Found to have elevated troponins overnight   -Complaining of L calf tenderness FU BL LE Dopp  -Preoped and plan for OR Today pending cards clearance  -Preop labs/imaging: CBC/BMP/PT/PTT/INR/T&S/Covid/CXR/EKG.  -NPO except meds  -IVFs while NPO  -NWB, bedrest  -DVT ppx  -Pain control prn  -Medical management, continue home meds.  -Case discussed with attending, will advise if plan changes.

## 2020-11-16 NOTE — PROGRESS NOTE ADULT - ASSESSMENT
A/P:    89y F L Jose Hip Arthroplasty POD #0    -Pain control  - DVT ppx  - WBAT/PT/OOB  - Hip precautions  - FU labs  - Med mgmt  - D/C Planning

## 2020-11-16 NOTE — DISCHARGE NOTE PROVIDER - PROVIDER TOKENS
PROVIDER:[TOKEN:[[id number]]] PROVIDER:[TOKEN:[[id number]]],PROVIDER:[TOKEN:[[id number]],FOLLOWUP:[1-3 days]],PROVIDER:[TOKEN:[[id number]],FOLLOWUP:[1 week]]

## 2020-11-16 NOTE — PROGRESS NOTE ADULT - ASSESSMENT
89 y/o F PMH autoimmune hepatitis, HTN, hypothyroidism, and dementia presents w/ mechanical fall, found to have L hip fx, planned for OR w/ orthopedics    #left subcapital hip fx s/p mechanical fall.   - planning for OR with orthopedics  - pain control  - VTE ppx post-op  - PT eval post-op  - neurovascular checks as indicated  - bedrest for now  - Maintain NPO as planned for OR today.   - vitamin D and calcium   -patient is  medically optimized for L hip surgery if clinically indicated. She is at intermediate to high risk for an intermediate risk surgery.     #Elevated Troponin  - likely in setting of demand ischemia  -do not suspect MI.   -appreciate cardiology recommendations.     #calf pain  - resolved.   -patients daughter informed of LE duplex findings and that patient should have follow up LE duplex in 1 weeks time as outpatient to further evlauate the incompletely visualized calf veins.     #leukocytosis likely stress induced 2/2 fall  -do not suspect infectious etiology.     #HTN  - continue Toprol    #HLD  - not on statin, likely 2/2 to history of autoimmune hepatitis    #h/o autoimmune hepatitis  - supportive care  - lft's reviewed and wnl  -f/u with GI as outpatient [Dr Miguel A Prater]    #IBS  -daughter reports that patient has history of IBS.   -also incidentally noted to have Bowel Wall thickening on CT of her Pelvis. Suspect it is likely due to her IBS.   -daughter informed that she should have patient f/u with her GI, Dr. Prater as outpatient. Last colonoscopy about 5-7 years ago as per daughter.     #hypothyroidism  - c/w synthroid    #cognitive impairment  - supportive care    #VTE ppx w/ SCD's for now. Pending Ortho rec's post operatively can add on DVT pharm ppx.   #GI ppx not indicated    Discussed with patients daughter, Amina Ashford via telephone. Discussed with RN and orthopedics.   Labs and vital signs reviewed. Imaging from this admission reviewed. 89 y/o F PMH autoimmune hepatitis, HTN, hypothyroidism, and dementia presents w/ mechanical fall, found to have L hip fx, planned for OR w/ orthopedics    #left subcapital hip fx s/p mechanical fall.   - planning for OR with orthopedics  - pain control  - VTE ppx post-op  - PT eval post-op  - neurovascular checks as indicated  - bedrest for now  - Maintain NPO as planned for OR today.   - vitamin D and calcium   -patient is  medically optimized for L hip surgery if clinically indicated. She is at intermediate to high risk for an intermediate risk surgery.     #Elevated Troponin  - likely in setting of demand ischemia  -do not suspect MI.   -appreciate cardiology recommendations.     #calf pain  - resolved.   -patients daughter informed of LE duplex findings and that patient should have follow up LE duplex in 1 weeks time as outpatient to further evlauate the incompletely visualized calf veins.     #leukocytosis likely stress induced 2/2 fall  -do not suspect infectious etiology.     #HTN  - continue Toprol    #HLD  - not on statin, likely 2/2 to history of autoimmune hepatitis    #thrombocytopenia:   -Platlets 147k. Continue to monitor.     #h/o autoimmune hepatitis  - supportive care  - lft's reviewed and wnl  -f/u with GI as outpatient [Dr Miguel A Prater]    #IBS  -daughter reports that patient has history of IBS.   -also incidentally noted to have Bowel Wall thickening on CT of her Pelvis. Suspect it is likely due to her IBS.   -daughter informed that she should have patient f/u with her GI, Dr. Prater as outpatient. Last colonoscopy about 5-7 years ago as per daughter.     #hypothyroidism  - c/w synthroid    #cognitive impairment  - supportive care    #VTE ppx w/ SCD's for now. Pending Ortho rec's post operatively can add on DVT pharm ppx.   #GI ppx not indicated    Discussed with patients daughter, Amina Ashford via telephone. Discussed with RN and orthopedics.   Labs and vital signs reviewed. Imaging from this admission reviewed.

## 2020-11-16 NOTE — DISCHARGE NOTE PROVIDER - NSDCACTIVITY_GEN_ALL_CORE
Showering allowed/Walking - Outdoors allowed/Walking - Indoors allowed Walking - Indoors allowed/No heavy lifting/straining/Showering allowed/Do not drive or operate machinery/Walking - Outdoors allowed

## 2020-11-16 NOTE — PROGRESS NOTE ADULT - SUBJECTIVE AND OBJECTIVE BOX
Chief Complaint: Fall    Interval Events: No events overnight.    Review of Systems:  General: No fevers, chills, weight loss or gain  Skin: No rashes, color changes  Cardiovascular: No chest pain, orthopnea  Respiratory: No shortness of breath, cough  Gastrointestinal: No nausea, abdominal pain  Genitourinary: No incontinence, pain with urination  Musculoskeletal: No pain, swelling, decreased range of motion  Neurological: No headache, weakness  Psychiatric: No depression, anxiety  Endocrine: No weight loss or gain, increased thirst  All other systems are comprehensively negative.    Physical Exam:  Vitals:        Vital Signs Last 24 Hrs  T(C): 37.1 (16 Nov 2020 06:18), Max: 37.1 (16 Nov 2020 06:18)  T(F): 98.8 (16 Nov 2020 06:18), Max: 98.8 (16 Nov 2020 06:18)  HR: 76 (16 Nov 2020 06:18) (67 - 84)  BP: 162/80 (16 Nov 2020 06:18) (133/63 - 162/80)  BP(mean): --  RR: 17 (16 Nov 2020 06:18) (17 - 18)  SpO2: 95% (16 Nov 2020 06:18) (90% - 95%)  General: NAD  HEENT: MMM  Neck: No JVD, no carotid bruit  Lungs: CTAB  CV: RRR, nl S1/S2, no M/R/G  Abdomen: S/NT/ND, +BS  Extremities: No LE edema, no cyanosis  Neuro: AAOx3, non-focal  Skin: No rash    Labs:                        12.0   12.68 )-----------( 147      ( 16 Nov 2020 05:24 )             35.1     11-16    141  |  106  |  14  ----------------------------<  97  3.6   |  27  |  0.66    Ca    8.5      16 Nov 2020 05:24  Mg     1.9     11-16    TPro  6.8  /  Alb  2.6<L>  /  TBili  0.7  /  DBili  x   /  AST  31  /  ALT  13  /  AlkPhos  71  11-16    CARDIAC MARKERS ( 16 Nov 2020 05:24 )  .661 ng/mL / x     / x     / x     / x      CARDIAC MARKERS ( 15 Nov 2020 19:40 )  1.060 ng/mL / x     / x     / x     / x      CARDIAC MARKERS ( 15 Nov 2020 12:01 )  .818 ng/mL / x     / x     / x     / x      CARDIAC MARKERS ( 15 Nov 2020 05:46 )  .643 ng/mL / x     / 122 U/L / x     / 6.0 ng/mL  CARDIAC MARKERS ( 14 Nov 2020 15:18 )  <.015 ng/mL / x     / 70 U/L / x     / 2.1 ng/mL      PT/INR - ( 16 Nov 2020 05:24 )   PT: 14.0 sec;   INR: 1.21 ratio         PTT - ( 16 Nov 2020 05:24 )  PTT:28.4 sec

## 2020-11-16 NOTE — CHART NOTE - NSCHARTNOTEFT_GEN_A_CORE
Orthopedics    Patient re-examined due to lethargy during post op check this afternoon.  Pt more alert and able to follow commands appropriately.      Vital Signs Last 24 Hrs  T(C): 36.3 (11-16-20 @ 20:30), Max: 37.1 (11-16-20 @ 06:18)  T(F): 97.4 (11-16-20 @ 20:30), Max: 98.8 (11-16-20 @ 06:18)  HR: 62 (11-16-20 @ 20:30) (61 - 84)  BP: 120/56 (11-16-20 @ 20:30) (104/46 - 162/80)  BP(mean): --  RR: 17 (11-16-20 @ 20:30) (12 - 19)  SpO2: 94% (11-16-20 @ 20:30) (90% - 100%)               Exam:  GEN: NAD, awake and alert.  LLE:  Dressing clean and dry  +EHL FHL TA GS  SILT L2-S1  +DP  Calf soft and nontender, compartments soft and compressible.    A/P:  89y M/F s/p L Hip hemiarthroplasty POD #0  -Pain control prn  - DVT ppx  - WBAT/PT/OOB as tolerated   - Hip precautions  - FU am labs  - Med mgmt, continue home meds.  -Will assess again in am. Orthopedics    Patient re-examined due to lethargy during post op check this afternoon.  Pt more alert and able to follow commands appropriately.      Vital Signs Last 24 Hrs  T(C): 36.3 (11-16-20 @ 20:30), Max: 37.1 (11-16-20 @ 06:18)  T(F): 97.4 (11-16-20 @ 20:30), Max: 98.8 (11-16-20 @ 06:18)  HR: 62 (11-16-20 @ 20:30) (61 - 84)  BP: 120/56 (11-16-20 @ 20:30) (104/46 - 162/80)  BP(mean): --  RR: 17 (11-16-20 @ 20:30) (12 - 19)  SpO2: 94% (11-16-20 @ 20:30) (90% - 100%)               Exam:  GEN: NAD, awake and alert.  LLE:  Dressing clean and dry  +EHL FHL TA GS  SILT L2-S1  +DP  Calf soft and nontender, compartments soft and compressible.    A/P:  89y M s/p L Hip hemiarthroplasty POD #0  -Pain control prn  - DVT ppx  - WBAT/PT/OOB as tolerated   - Hip precautions  - FU am labs  - Med mgmt, continue home meds.  -Will assess again in am.

## 2020-11-16 NOTE — PROGRESS NOTE ADULT - SUBJECTIVE AND OBJECTIVE BOX
Pt seen and examined at bedside. She reports L hip pain which is improved since yesterday. She has no other complaints.   She is alert and oriented to person and place. She is aware of her diagnosis and why she is in the hospital.   Denies headaches, nausea, vomiting, chest pain, SOB, palpitations, abdominal pain, constipation, diarrhea, melena, hematochezia, dysuria.   No events overnight.       T(C): 37.1 (11-16-20 @ 06:18), Max: 37.1 (11-16-20 @ 06:18)  HR: 76 (11-16-20 @ 06:18) (67 - 84)  BP: 162/80 (11-16-20 @ 06:18) (133/63 - 162/80)  RR: 17 (11-16-20 @ 06:18) (17 - 18)  SpO2: 95% (11-16-20 @ 06:18) (90% - 95%)  Wt(kg): --    Physical Exam:   GENERAL: well-groomed, well-developed, NAD  HEENT: head NC/AT; EOM intact, conjunctiva & sclera clear; hearing grossly intact, moist mucous membranes  NECK: supple, no JVD  RESPIRATORY: CTA B/L, no wheezing, rales, rhonchi or rubs  CARDIOVASCULAR: S1&S2, RRR, no murmurs or gallops  ABDOMEN: soft, non-tender, non-distended, + Bowel sounds x4 quadrants, no guarding, rebound or rigidity  MUSCULOSKELETAL:  no clubbing, cyanosis or edema of all 4 extremities  LYMPH: no cervical lymphadenopathy  VASCULAR: Radial pulses 2+ bilaterally, no varicose veins   SKIN: warm and dry, color normal  NEUROLOGIC: AA&O X2 [person and place], CN2-12 intact w/ no focal deficits, no sensory loss, motor Strength 5/5 in UE, 4/5 in RLE and patient had pain in LLE so would not move.   Psych: Normal mood and affect, normal behavior

## 2020-11-16 NOTE — PRE-OP CHECKLIST - SELECT TESTS ORDERED
Type and Screen/PT/PTT/BMP/COVID/CXR/EKG/INR/CBC/CMP BMP/PT/PTT/COVID/EKG/CMP/Hepatic Function/Type and Screen/CXR/INR/CBC

## 2020-11-16 NOTE — PROGRESS NOTE ADULT - ASSESSMENT
The patient is an 89 year old female with a history of HTN, hypothyroidism, dementia who is admitted with hip fracture.    Plan:  - ECG with new nonspecific anterior ST abnormality, new compared to ECG from 8/20  - No symptoms suggestive of MI or PE, although patient is a poor historian  - Troponin elevated to 1.06 and trended down, possibly in the setting of demand ischemia from tachycardia, hypertension, and stress from fracture. Low suspicion for ACS.  - Echo with normal LV systolic function, mild MS, mod AR, mod TR, mod pulm HTN  - LE venous duplex negative for DVT  - BNP elevated at 7915 but no evidence of decompensated heart failure on exam  - Continue aspirin 81 mg daily  - Continue metoprolol succinate 50 mg daily  - The patient is at intermediate/high risk for cardiac events for an intermediate risk surgery. The patient is optimized from a cardiac perspective to proceed for surgery.

## 2020-11-16 NOTE — PROGRESS NOTE ADULT - SUBJECTIVE AND OBJECTIVE BOX
Patient seen and examined at bedside this AM. Reports she has continued pain in the hip. Denies any CP/SOB/Numbness/Tingling at this time.    PHYSICAL EXAM:  Vital Signs Last 24 Hrs  T(C): 36.8 (15 Nov 2020 21:59), Max: 36.8 (15 Nov 2020 21:59)  T(F): 98.2 (15 Nov 2020 21:59), Max: 98.2 (15 Nov 2020 21:59)  HR: 84 (15 Nov 2020 21:59) (67 - 84)  BP: 154/72 (15 Nov 2020 21:59) (133/63 - 154/72)  BP(mean): --  RR: 17 (15 Nov 2020 22:20) (17 - 18)  SpO2: 95% (15 Nov 2020 22:20) (90% - 95%)    Gen: NAD, Resting comfortably  LLE:  Skin intact, no erythema or ecchymosis  Leg shortened and externally rotated.   No bony tenderness to palpation  +EHL/FHL/TA/GSC  +SILT L3-S1  + DP  Compartments soft and compressible  Left calf tenderness

## 2020-11-17 LAB
ANION GAP SERPL CALC-SCNC: 5 MMOL/L — SIGNIFICANT CHANGE UP (ref 5–17)
BUN SERPL-MCNC: 20 MG/DL — SIGNIFICANT CHANGE UP (ref 7–23)
CALCIUM SERPL-MCNC: 8.9 MG/DL — SIGNIFICANT CHANGE UP (ref 8.5–10.1)
CHLORIDE SERPL-SCNC: 105 MMOL/L — SIGNIFICANT CHANGE UP (ref 96–108)
CO2 SERPL-SCNC: 30 MMOL/L — SIGNIFICANT CHANGE UP (ref 22–31)
CREAT SERPL-MCNC: 0.83 MG/DL — SIGNIFICANT CHANGE UP (ref 0.5–1.3)
GLUCOSE SERPL-MCNC: 134 MG/DL — HIGH (ref 70–99)
HCT VFR BLD CALC: 33.8 % — LOW (ref 34.5–45)
HGB BLD-MCNC: 11.4 G/DL — LOW (ref 11.5–15.5)
MCHC RBC-ENTMCNC: 30.6 PG — SIGNIFICANT CHANGE UP (ref 27–34)
MCHC RBC-ENTMCNC: 33.7 GM/DL — SIGNIFICANT CHANGE UP (ref 32–36)
MCV RBC AUTO: 90.9 FL — SIGNIFICANT CHANGE UP (ref 80–100)
NRBC # BLD: 0 /100 WBCS — SIGNIFICANT CHANGE UP (ref 0–0)
PLATELET # BLD AUTO: 144 K/UL — LOW (ref 150–400)
POTASSIUM SERPL-MCNC: 4.8 MMOL/L — SIGNIFICANT CHANGE UP (ref 3.5–5.3)
POTASSIUM SERPL-SCNC: 4.8 MMOL/L — SIGNIFICANT CHANGE UP (ref 3.5–5.3)
RBC # BLD: 3.72 M/UL — LOW (ref 3.8–5.2)
RBC # FLD: 14 % — SIGNIFICANT CHANGE UP (ref 10.3–14.5)
SODIUM SERPL-SCNC: 140 MMOL/L — SIGNIFICANT CHANGE UP (ref 135–145)
WBC # BLD: 14.88 K/UL — HIGH (ref 3.8–10.5)
WBC # FLD AUTO: 14.88 K/UL — HIGH (ref 3.8–10.5)

## 2020-11-17 PROCEDURE — 99233 SBSQ HOSP IP/OBS HIGH 50: CPT | Mod: GC

## 2020-11-17 RX ADMIN — POLYETHYLENE GLYCOL 3350 17 GRAM(S): 17 POWDER, FOR SOLUTION ORAL at 11:49

## 2020-11-17 RX ADMIN — Medication 1 TABLET(S): at 05:18

## 2020-11-17 RX ADMIN — Medication 975 MILLIGRAM(S): at 15:01

## 2020-11-17 RX ADMIN — Medication 50 MILLIGRAM(S): at 21:44

## 2020-11-17 RX ADMIN — OXYCODONE HYDROCHLORIDE 5 MILLIGRAM(S): 5 TABLET ORAL at 10:11

## 2020-11-17 RX ADMIN — RIVAROXABAN 10 MILLIGRAM(S): KIT at 11:49

## 2020-11-17 RX ADMIN — Medication 100 MILLIGRAM(S): at 03:40

## 2020-11-17 RX ADMIN — Medication 10 MILLIGRAM(S): at 13:53

## 2020-11-17 RX ADMIN — Medication 975 MILLIGRAM(S): at 14:01

## 2020-11-17 RX ADMIN — SENNA PLUS 2 TABLET(S): 8.6 TABLET ORAL at 21:44

## 2020-11-17 RX ADMIN — Medication 75 MICROGRAM(S): at 05:18

## 2020-11-17 RX ADMIN — Medication 1 TABLET(S): at 11:50

## 2020-11-17 RX ADMIN — OXYCODONE HYDROCHLORIDE 5 MILLIGRAM(S): 5 TABLET ORAL at 11:08

## 2020-11-17 RX ADMIN — ESCITALOPRAM OXALATE 5 MILLIGRAM(S): 10 TABLET, FILM COATED ORAL at 11:50

## 2020-11-17 NOTE — PHYSICAL THERAPY INITIAL EVALUATION ADULT - RANGE OF MOTION EXAMINATION, REHAB EVAL
bilateral upper extremity ROM was WFL (within functional limits)/bilateral lower extremity ROM was WFL (within functional limits)/deficits as listed below

## 2020-11-17 NOTE — OCCUPATIONAL THERAPY INITIAL EVALUATION ADULT - ADDITIONAL COMMENTS
Pt is a questionable historian due to periods of confusion/disorientation upon eval. Pt states she lives with her sister in a private home with 4-5 stairs to enter and 6 steps to bedroom. PTA she used RW for functional mobility and was independent with ADLs per pt report.

## 2020-11-17 NOTE — PHYSICAL THERAPY INITIAL EVALUATION ADULT - DID THE PATIENT HAVE SURGERY?
Adult Mental Health Outpatient Group Therapy Progress Note     Client Initial Individualized Goals for Treatment: be able to concentrate so can do my job, fix the depression so I can be happier and normal, don't want to spend all my time either working or sleeping/want a normal life, want to enjoy hobbies agian      Treatment Goals:  1) Safety:     Client will notify staff when needing assistance to develop or implement a coping plan to manage suicidal or self injurious urges.     Client will use coping plan for safety, as needed.  2) Symptom Management:     Abisai will process triggers in a self compassionate way and learn how to treat himself with kindness.    Abisai will Learn ways to process reactions to unhelpful comments and reframe his thought processes.  3) In Life Skills Abisai will:    Learn, practice, apply 2 skills/strategies for improved lifestyle balance with an emphasis on leisure and setting healthy boundaries for his time and energy.    Learn, practice, generalize 2 sensory or mindfulness based self regulation skills for improved concentration in his meaningful life roles, routines, and relationships.  4) Discharge preparation: Will develop an aftercare / transition plan by discharge date          Area of Treatment Focus:  Symptom Management and Develop Socialization / Interpersonal Relationship Skills    Therapeutic Interventions/Treatment Strategies:  Support, Feedback, Structured Activity, Clarification and Education    Response to Treatment Strategies:  Accepted Feedback, Gave Feedback, Listened, Focused on Goals, Attentive, Accepted Support and Alert    Name of Group:  Mental Health Management, [id number], Self Awareness [id number], 4 of 4 participants     Description and Outcome:  Mental Health Management: The group participated in a structured, psychoeducational discussion and activity comparing and contrasting self esteem and self compassion.  Self esteem was presented as a useful, but limiting  "construct, as if is prone to change with circumstances and relies on comparisons to others.  On the other hand, self compassion can be described as breathing the self kindly, especially during struggle.  Self compassion facilitates connection to others.  Handouts, including exercises to practise self compassion, were given. Abisai demonstrated understanding of session by sharing examples from personal experiences.  Identifies struggles with self compassion.    Self Awareness: The group was provided with a guided breathing and warmup structure with focus on increasing self awareness and on providing an embodied experience.  Discussion included the importance of listening to body cues as a way of identifying emotion as well as accompanying needs and wants, and as a way of practising self compassion, authenticity, mindfulness, self expression,  and connection to other.  The group challenged themselves to \"be\" using their breath and with a prop.  Abisai noted that this was difficult for him and observed preoccupation in himself.      Is this a Weekly Review of the Progress on the Treatment Plan?  No        " yes/left hip manas arthroplasty

## 2020-11-17 NOTE — PROGRESS NOTE ADULT - ASSESSMENT
A/P:  89y M s/p L Hip hemiarthroplasty POD #1  -Pain control prn  - DVT ppx  - WBAT/PT/OOB as tolerated   - Hip precautions  - FU am labs  - Med mgmt, continue home meds.  -Will FU PT recs for dispo  -Discussed with Attending.

## 2020-11-17 NOTE — PHYSICAL THERAPY INITIAL EVALUATION ADULT - ADDITIONAL COMMENTS
Pt lives in a private home with her sister. Pt has 4 steps to enter with 4 JOSE ARMANDO with bilateral handrails. Once inside it is split level style several small stair cases with one handrail

## 2020-11-17 NOTE — PROGRESS NOTE ADULT - ASSESSMENT
The patient is an 89 year old female with a history of HTN, hypothyroidism, dementia who is admitted with hip fracture.    Plan:  - Troponin elevated to 1.06 and trended down, possibly in the setting of demand ischemia from tachycardia, hypertension, and stress from fracture. Low suspicion for ACS. Not a candidate for further invasive or noninvasive work-up.  - Echo with normal LV systolic function, mild MS, mod AR, mod TR, mod pulm HTN  - LE venous duplex negative for DVT  - BNP elevated at 7915 but no evidence of decompensated heart failure on exam  - Continue aspirin 81 mg daily  - Continue metoprolol succinate 50 mg daily  - Pain control  - PT  - Ortho follow-up

## 2020-11-17 NOTE — PROGRESS NOTE ADULT - SUBJECTIVE AND OBJECTIVE BOX
Patient is a 89y old  Female who presents with a chief complaint of s/p fall (17 Nov 2020 10:00)      INTERVAL HPI/OVERNIGHT EVENTS: The patient was seen and examined at bedside. No acute complaints overnight. Patient admits to feeling overall nervous today. Admit LLE pain when she moves, but is controlled with her current pain control regimen. Denies fevers, chills, n/v/d, cp, sob.    MEDICATIONS  (STANDING):  bisacodyl Suppository 10 milliGRAM(s) Rectal once  calcium carbonate 1250 mG  + Vitamin D (OsCal 500 + D) 1 Tablet(s) Oral two times a day  escitalopram 5 milliGRAM(s) Oral daily  lactobacillus acidophilus 1 Tablet(s) Oral daily  levothyroxine 75 MICROGram(s) Oral daily  metoprolol succinate ER 50 milliGRAM(s) Oral at bedtime  polyethylene glycol 3350 17 Gram(s) Oral daily  rivaroxaban 10 milliGRAM(s) Oral daily  senna 2 Tablet(s) Oral at bedtime    MEDICATIONS  (PRN):  acetaminophen   Tablet .. 975 milliGRAM(s) Oral every 8 hours PRN Temp greater or equal to 38C (100.4F), Mild Pain (1 - 3)  melatonin 3 milliGRAM(s) Oral at bedtime PRN Insomnia  ondansetron Injectable 4 milliGRAM(s) IV Push every 6 hours PRN Nausea and/or Vomiting  oxyCODONE    IR 2.5 milliGRAM(s) Oral every 4 hours PRN Moderate Pain (4 - 6)  oxyCODONE    IR 5 milliGRAM(s) Oral every 4 hours PRN Severe Pain (7 - 10)      Allergies    latex (Rash)  penicillin (Unknown)  sulfa drugs (Unknown)    Intolerances        REVIEW OF SYSTEMS:  CONSTITUTIONAL: No fever or chills  HEENT:  No headache, no sore throat  RESPIRATORY: No cough, wheezing, or shortness of breath  CARDIOVASCULAR: No chest pain, palpitations  GASTROINTESTINAL: No abd pain, nausea, vomiting, or diarrhea  GENITOURINARY: No dysuria, frequency, or hematuria  NEUROLOGICAL: no focal weakness or dizziness  MUSCULOSKELETAL: no myalgias     Vital Signs Last 24 Hrs  T(C): 37.1 (17 Nov 2020 13:34), Max: 37.1 (17 Nov 2020 13:34)  T(F): 98.7 (17 Nov 2020 13:34), Max: 98.7 (17 Nov 2020 13:34)  HR: 97 (17 Nov 2020 13:34) (61 - 97)  BP: 145/75 (17 Nov 2020 13:34) (104/46 - 169/74)  BP(mean): --  RR: 18 (17 Nov 2020 13:34) (12 - 18)  SpO2: 96% (17 Nov 2020 13:34) (94% - 100%)    PHYSICAL EXAM:  GENERAL: NAD, elderly, anxious appearing  HEENT:  anicteric, moist mucous membranes  CHEST/LUNG:  CTA b/l, no rales, wheezes, or rhonchi  HEART:  RRR, S1, S2  ABDOMEN:  BS+, soft, nontender, nondistended  EXTREMITIES: no edema, cyanosis, +minimal b/l LE TTP with no notable erythema; LLE plantar flexion 3/5; clean and dry bandage LLE  NERVOUS SYSTEM: answers questions and follows commands appropriately; AAOx2(self and place)    LABS:                        11.4   14.88 )-----------( 144      ( 17 Nov 2020 07:23 )             33.8     CBC Full  -  ( 17 Nov 2020 07:23 )  WBC Count : 14.88 K/uL  Hemoglobin : 11.4 g/dL  Hematocrit : 33.8 %  Platelet Count - Automated : 144 K/uL  Mean Cell Volume : 90.9 fl  Mean Cell Hemoglobin : 30.6 pg  Mean Cell Hemoglobin Concentration : 33.7 gm/dL  Auto Neutrophil # : x  Auto Lymphocyte # : x  Auto Monocyte # : x  Auto Eosinophil # : x  Auto Basophil # : x  Auto Neutrophil % : x  Auto Lymphocyte % : x  Auto Monocyte % : x  Auto Eosinophil % : x  Auto Basophil % : x    17 Nov 2020 07:23    140    |  105    |  20     ----------------------------<  134    4.8     |  30     |  0.83     Ca    8.9        17 Nov 2020 07:23      PT/INR - ( 16 Nov 2020 05:24 )   PT: 14.0 sec;   INR: 1.21 ratio         PTT - ( 16 Nov 2020 05:24 )  PTT:28.4 sec    CAPILLARY BLOOD GLUCOSE              RADIOLOGY & ADDITIONAL TESTS:    Personally reviewed.     Consultant(s) Notes Reviewed:  [x] YES  [ ] NO     Patient is a 89y old  Female who presents with a chief complaint of s/p fall (17 Nov 2020 10:00)      INTERVAL HPI/OVERNIGHT EVENTS: The patient was seen and examined at bedside. No acute complaints overnight. Patient admits to feeling overall nervous today. Admit LLE pain when she moves, but is controlled with her current pain control regimen. Denies fevers, chills, n/v/d, cp, sob. Pt is a limited hsitorian and is unable to provide any reliable history. Unable to obtain ROS either.     MEDICATIONS  (STANDING):  bisacodyl Suppository 10 milliGRAM(s) Rectal once  calcium carbonate 1250 mG  + Vitamin D (OsCal 500 + D) 1 Tablet(s) Oral two times a day  escitalopram 5 milliGRAM(s) Oral daily  lactobacillus acidophilus 1 Tablet(s) Oral daily  levothyroxine 75 MICROGram(s) Oral daily  metoprolol succinate ER 50 milliGRAM(s) Oral at bedtime  polyethylene glycol 3350 17 Gram(s) Oral daily  rivaroxaban 10 milliGRAM(s) Oral daily  senna 2 Tablet(s) Oral at bedtime    MEDICATIONS  (PRN):  acetaminophen   Tablet .. 975 milliGRAM(s) Oral every 8 hours PRN Temp greater or equal to 38C (100.4F), Mild Pain (1 - 3)  melatonin 3 milliGRAM(s) Oral at bedtime PRN Insomnia  ondansetron Injectable 4 milliGRAM(s) IV Push every 6 hours PRN Nausea and/or Vomiting  oxyCODONE    IR 2.5 milliGRAM(s) Oral every 4 hours PRN Moderate Pain (4 - 6)  oxyCODONE    IR 5 milliGRAM(s) Oral every 4 hours PRN Severe Pain (7 - 10)      Allergies    latex (Rash)  penicillin (Unknown)  sulfa drugs (Unknown)    Intolerances        REVIEW OF SYSTEMS:  unable to obtain given cognitive impairment post-op    Vital Signs Last 24 Hrs  T(C): 37.1 (17 Nov 2020 13:34), Max: 37.1 (17 Nov 2020 13:34)  T(F): 98.7 (17 Nov 2020 13:34), Max: 98.7 (17 Nov 2020 13:34)  HR: 97 (17 Nov 2020 13:34) (61 - 97)  BP: 145/75 (17 Nov 2020 13:34) (104/46 - 169/74)  BP(mean): --  RR: 18 (17 Nov 2020 13:34) (12 - 18)  SpO2: 96% (17 Nov 2020 13:34) (94% - 100%)    PHYSICAL EXAM:  GENERAL: NAD, elderly, anxious appearing  HEENT:  anicteric, moist mucous membranes  CHEST/LUNG:  CTA b/l, no rales, wheezes, or rhonchi  HEART:  RRR, S1, S2  ABDOMEN:  BS+, soft, nontender, nondistended  EXTREMITIES: no edema, cyanosis, +minimal b/l LE TTP with no notable erythema; LLE plantar flexion 3/5; clean and dry bandage LLE  NERVOUS SYSTEM: disoriented. only oriented to person. confused to place and situation    LABS:                        11.4   14.88 )-----------( 144      ( 17 Nov 2020 07:23 )             33.8     CBC Full  -  ( 17 Nov 2020 07:23 )  WBC Count : 14.88 K/uL  Hemoglobin : 11.4 g/dL  Hematocrit : 33.8 %  Platelet Count - Automated : 144 K/uL  Mean Cell Volume : 90.9 fl  Mean Cell Hemoglobin : 30.6 pg  Mean Cell Hemoglobin Concentration : 33.7 gm/dL  Auto Neutrophil # : x  Auto Lymphocyte # : x  Auto Monocyte # : x  Auto Eosinophil # : x  Auto Basophil # : x  Auto Neutrophil % : x  Auto Lymphocyte % : x  Auto Monocyte % : x  Auto Eosinophil % : x  Auto Basophil % : x    17 Nov 2020 07:23    140    |  105    |  20     ----------------------------<  134    4.8     |  30     |  0.83     Ca    8.9        17 Nov 2020 07:23      PT/INR - ( 16 Nov 2020 05:24 )   PT: 14.0 sec;   INR: 1.21 ratio         PTT - ( 16 Nov 2020 05:24 )  PTT:28.4 sec    CAPILLARY BLOOD GLUCOSE              RADIOLOGY & ADDITIONAL TESTS:    Personally reviewed.     Consultant(s) Notes Reviewed:  [x] YES  [ ] NO

## 2020-11-17 NOTE — OCCUPATIONAL THERAPY INITIAL EVALUATION ADULT - IADL RETRAINING, OT EVAL
Patient will increase standing tolerance to 1-2 minutes with RW to promote safe commode transfers within 3-5 sessions

## 2020-11-17 NOTE — PROGRESS NOTE ADULT - SUBJECTIVE AND OBJECTIVE BOX
Chief Complaint: Fall    Interval Events: Underwent surgery yesterday. Some pain at surgical site.    Review of Systems:  General: No fevers, chills, weight loss or gain  Skin: No rashes, color changes  Cardiovascular: No chest pain, orthopnea  Respiratory: No shortness of breath, cough  Gastrointestinal: No nausea, abdominal pain  Genitourinary: No incontinence, pain with urination  Musculoskeletal: (+) pain, swelling, decreased range of motion  Neurological: No headache, weakness  Psychiatric: No depression, anxiety  Endocrine: No weight loss or gain, increased thirst  All other systems are comprehensively negative.    Physical Exam:  Vital Signs Last 24 Hrs  T(C): 36.4 (17 Nov 2020 04:45), Max: 36.9 (17 Nov 2020 00:45)  T(F): 97.6 (17 Nov 2020 04:45), Max: 98.5 (17 Nov 2020 00:45)  HR: 66 (17 Nov 2020 04:45) (61 - 78)  BP: 169/74 (17 Nov 2020 04:45) (104/46 - 169/74)  BP(mean): --  RR: 17 (17 Nov 2020 04:45) (12 - 19)  SpO2: 97% (17 Nov 2020 04:45) (94% - 100%)  General: NAD  HEENT: MMM  Neck: No JVD, no carotid bruit  Lungs: CTAB  CV: RRR, nl S1/S2, no M/R/G  Abdomen: S/NT/ND, +BS  Extremities: No LE edema, no cyanosis  Neuro: AAOx3, non-focal  Skin: No rash    Labs:             11-16    140  |  106  |  14  ----------------------------<  122<H>  3.7   |  28  |  0.73    Ca    8.2<L>      16 Nov 2020 14:50  Mg     1.9     11-16    TPro  6.8  /  Alb  2.6<L>  /  TBili  0.7  /  DBili  x   /  AST  31  /  ALT  13  /  AlkPhos  71  11-16                        11.4   14.88 )-----------( 144      ( 17 Nov 2020 07:23 )             33.8

## 2020-11-17 NOTE — PROGRESS NOTE ADULT - SUBJECTIVE AND OBJECTIVE BOX
POD 1 L Jose Hip Arthroplasty  Pt seen and examined. Following commands and conversing, a bit confused stating that she never underwent surgery.     Vital Signs Last 24 Hrs  T(C): 36.4 (17 Nov 2020 04:45), Max: 36.9 (17 Nov 2020 00:45)  T(F): 97.6 (17 Nov 2020 04:45), Max: 98.5 (17 Nov 2020 00:45)  HR: 66 (17 Nov 2020 04:45) (61 - 78)  BP: 169/74 (17 Nov 2020 04:45) (104/46 - 169/74)  BP(mean): --  RR: 17 (17 Nov 2020 04:45) (12 - 19)  SpO2: 97% (17 Nov 2020 04:45) (94% - 100%)      Exam:  NAD, awake and alert, mildly confused.   Dressing clean and dry  +EHL FHL TA GS  SILT L2-S1  +DP  Calf Soft NT

## 2020-11-17 NOTE — PROGRESS NOTE ADULT - SUBJECTIVE AND OBJECTIVE BOX
The patient was interviewed and evaluated.    89y Female    T(C): 36.4 (11-17-20 @ 04:45), Max: 36.9 (11-17-20 @ 00:45)  HR: 66 (11-17-20 @ 04:45) (61 - 78)  BP: 169/74 (11-17-20 @ 04:45) (104/46 - 169/74)  RR: 17 (11-17-20 @ 04:45) (12 - 19)  SpO2: 97% (11-17-20 @ 04:45) (94% - 100%)  Wt(kg): --    No Nausea/vomiting, recall, sore throat or headache.    No anesthesia related complaints or sequelae.

## 2020-11-17 NOTE — OCCUPATIONAL THERAPY INITIAL EVALUATION ADULT - PERTINENT HX OF CURRENT PROBLEM, REHAB EVAL
Pt is a 88 y/o F PMH autoimmune hepatitis, HTN, hypothyroidism, and possible dementia. She is brought in for evaluation s/p fall. Patient was trying to turn with her walker. She lost balance and fell on top of the walker. Pt s/p Left hip hemiarthroplasty 11/16/2020.

## 2020-11-17 NOTE — OCCUPATIONAL THERAPY INITIAL EVALUATION ADULT - TRANSFER TRAINING, PT EVAL
Pt will improve sit to/from stands to minAx2 in prep for safe commode transfers within 3-5 sessions.

## 2020-11-17 NOTE — PROGRESS NOTE ADULT - ASSESSMENT
89 y/o F PMH autoimmune hepatitis, HTN, hypothyroidism, and dementia presents w/ mechanical fall, found to have L hip fx, POD 1 L Jose Hip Arthroplasty.    #left subcapital hip fx s/p mechanical fall.   - POD 1 L Jose Hip Arthroplasty  - c/w current pain control regimen  - VTE ppx post-op  - PT eval post-op  - neurovascular checks as indicated  - bedrest for now  - Maintain NPO as planned for OR today.   - vitamin D and calcium   -patient is  medically optimized for L hip surgery if clinically indicated. She is at intermediate to high risk for an intermediate risk surgery.     #Elevated Troponin  - likely in setting of demand ischemia  -do not suspect MI.   -appreciate cardiology recommendations.     #calf pain  - resolved.   -patients daughter informed of LE duplex findings and that patient should have follow up LE duplex in 1 weeks time as outpatient to further evlauate the incompletely visualized calf veins.     #leukocytosis likely stress induced 2/2 fall  -do not suspect infectious etiology.     #HTN  - continue Toprol    #HLD  - not on statin, likely 2/2 to history of autoimmune hepatitis    #thrombocytopenia:   -Platlets 147k. Continue to monitor.     #h/o autoimmune hepatitis  - supportive care  - lft's reviewed and wnl  -f/u with GI as outpatient [Dr Miguel A Prater]    #IBS  -daughter reports that patient has history of IBS.   -also incidentally noted to have Bowel Wall thickening on CT of her Pelvis. Suspect it is likely due to her IBS.   -daughter informed that she should have patient f/u with her GI, Dr. Prater as outpatient. Last colonoscopy about 5-7 years ago as per daughter.     #hypothyroidism  - c/w synthroid    #cognitive impairment  - supportive care    #VTE ppx w/ SCD's for now. Pending Ortho rec's post operatively can add on DVT pharm ppx.   #GI ppx not indicated    Discussed with patients daughter, Amina Ashford via telephone. Discussed with RN and orthopedics.   Labs and vital signs reviewed. Imaging from this admission reviewed. 88 y/o F PMH autoimmune hepatitis, HTN, hypothyroidism, and dementia presents w/ mechanical fall, found to have L hip fx, POD 1 L Jose Hip Arthroplasty.    #left subcapital hip fx s/p mechanical fall.   - POD 1 L Jose Hip Arthroplasty  - WBC noted; likely reactive 2/2surgery -- trend WBC and temps  - c/w current pain control regimen (tylenol, Oxy 2.5, and Oxy 5)  - C/w Xarelto 10mg daily for 35 days for post-op DVT/PE prophylaxis   - PT recs [facility]  - neurovascular checks as indicated  - Pt tolerating DASH/TLC diet  - c/w bowel regimen  - vitamin D and calcium   -Ortho following     #Elevated Troponin  - likely in setting of demand ischemia  - do not suspect MI.   - Cardiology, Dr. Ortez, following    #calf pain  - ttp; likely with component of pressure from SCD's   -patients daughter informed of LE duplex findings and that patient should have follow up LE duplex in 1 weeks time as outpatient to further evlauate the incompletely visualized calf veins.     #leukocytosis likely stress induced 2/2 fall  -do not suspect infectious etiology.  - WBC noted; likely reactive 2/2 surgery -- trend WBC and temps    #HTN  - elevated likely due to component of pain as well  - continue Toprol  - c/w current pain control regimen    #HLD  - not on statin, likely 2/2 to history of autoimmune hepatitis    #thrombocytopenia:   - Platelets 144k. Continue to monitor.     #h/o autoimmune hepatitis  - supportive care  - lft's reviewed and wnl  -f/u with GI as outpatient [Dr Miguel A Prater]    #IBS  -daughter reports that patient has history of IBS.   -also incidentally noted to have Bowel Wall thickening on CT of her Pelvis. Suspect it is likely due to her IBS.   -daughter informed that she should have patient f/u with her GI, Dr. Prater as outpatient. Last colonoscopy about 5-7 years ago as per daughter.     #hypothyroidism  - c/w synthroid    #cognitive impairment  - supportive care    #VTE ppx: on Xarelto  #GI ppx not indicated     89 y/o F PMH autoimmune hepatitis, HTN, hypothyroidism, and dementia presents w/ mechanical fall, found to have L hip fx, POD 1 L Jose Hip Arthroplasty.    #left subcapital hip fx s/p mechanical fall.   - POD 1 L Jose Hip Arthroplasty  - WBC noted; likely reactive 2/2surgery -- trend WBC and temps  - c/w current pain control regimen (tylenol, Oxy 2.5, and Oxy 5)  - C/w Xarelto 10mg daily for 35 days for post-op DVT/PE prophylaxis   - PT recs [facility]  - neurovascular checks as indicated  - Pt tolerating DASH/TLC diet  - c/w bowel regimen  - vitamin D and calcium   - Ortho following  - developed some post-op hospital acquired delirium today, recmmended frequent reorientation and reassurance. pharmacologic agents not advised at this time    #Elevated Troponin  - likely in setting of demand ischemia  - do not suspect MI.   - Cardiology, Dr. Ortez, following    #calf pain  - ttp; likely with component of pressure from SCD's   -patients daughter informed of LE duplex findings and that patient should have follow up LE duplex in 1 weeks time as outpatient to further evlauate the incompletely visualized calf veins.     #leukocytosis likely stress induced 2/2 fall  - do not suspect infectious etiology.  - WBC noted; likely reactive 2/2 surgery -- trend WBC and temps    #HTN  - elevated likely due to component of pain as well  - continue Toprol  - c/w current pain control regimen    #HLD  - not on statin, likely 2/2 to history of autoimmune hepatitis    #thrombocytopenia:   - Platelets 144k. Continue to monitor.     #h/o autoimmune hepatitis  - supportive care  - lft's reviewed and wnl  - f/u with GI as outpatient [Dr Miguel A Prater]    #IBS  -daughter reports that patient has history of IBS.   -also incidentally noted to have Bowel Wall thickening on CT of her Pelvis. Suspect it is likely due to her IBS.   -daughter informed that she should have patient f/u with her GI, Dr. Prater as outpatient. Last colonoscopy about 5-7 years ago as per daughter.     #hypothyroidism  - c/w synthroid    #cognitive impairment  - supportive care    #VTE ppx: on Xarelto  #GI ppx not indicated

## 2020-11-18 LAB
ANION GAP SERPL CALC-SCNC: 4 MMOL/L — LOW (ref 5–17)
BUN SERPL-MCNC: 27 MG/DL — HIGH (ref 7–23)
CALCIUM SERPL-MCNC: 9 MG/DL — SIGNIFICANT CHANGE UP (ref 8.5–10.1)
CHLORIDE SERPL-SCNC: 105 MMOL/L — SIGNIFICANT CHANGE UP (ref 96–108)
CO2 SERPL-SCNC: 31 MMOL/L — SIGNIFICANT CHANGE UP (ref 22–31)
CREAT SERPL-MCNC: 0.75 MG/DL — SIGNIFICANT CHANGE UP (ref 0.5–1.3)
GLUCOSE SERPL-MCNC: 94 MG/DL — SIGNIFICANT CHANGE UP (ref 70–99)
HCT VFR BLD CALC: 33.4 % — LOW (ref 34.5–45)
HGB BLD-MCNC: 11.2 G/DL — LOW (ref 11.5–15.5)
MCHC RBC-ENTMCNC: 30.4 PG — SIGNIFICANT CHANGE UP (ref 27–34)
MCHC RBC-ENTMCNC: 33.5 GM/DL — SIGNIFICANT CHANGE UP (ref 32–36)
MCV RBC AUTO: 90.8 FL — SIGNIFICANT CHANGE UP (ref 80–100)
NRBC # BLD: 0 /100 WBCS — SIGNIFICANT CHANGE UP (ref 0–0)
PLATELET # BLD AUTO: 159 K/UL — SIGNIFICANT CHANGE UP (ref 150–400)
POTASSIUM SERPL-MCNC: 4 MMOL/L — SIGNIFICANT CHANGE UP (ref 3.5–5.3)
POTASSIUM SERPL-SCNC: 4 MMOL/L — SIGNIFICANT CHANGE UP (ref 3.5–5.3)
RBC # BLD: 3.68 M/UL — LOW (ref 3.8–5.2)
RBC # FLD: 14.1 % — SIGNIFICANT CHANGE UP (ref 10.3–14.5)
SODIUM SERPL-SCNC: 140 MMOL/L — SIGNIFICANT CHANGE UP (ref 135–145)
WBC # BLD: 16.12 K/UL — HIGH (ref 3.8–10.5)
WBC # FLD AUTO: 16.12 K/UL — HIGH (ref 3.8–10.5)

## 2020-11-18 PROCEDURE — 71045 X-RAY EXAM CHEST 1 VIEW: CPT | Mod: 26

## 2020-11-18 PROCEDURE — 99233 SBSQ HOSP IP/OBS HIGH 50: CPT | Mod: GC

## 2020-11-18 RX ORDER — POLYETHYLENE GLYCOL 3350 17 G/17G
17 POWDER, FOR SOLUTION ORAL
Qty: 170 | Refills: 0
Start: 2020-11-18 | End: 2020-11-27

## 2020-11-18 RX ORDER — OXYCODONE HYDROCHLORIDE 5 MG/1
1 TABLET ORAL
Qty: 0 | Refills: 0 | DISCHARGE
Start: 2020-11-18

## 2020-11-18 RX ORDER — ACETAMINOPHEN 500 MG
975 TABLET ORAL ONCE
Refills: 0 | Status: COMPLETED | OUTPATIENT
Start: 2020-11-18 | End: 2020-11-18

## 2020-11-18 RX ORDER — RIVAROXABAN 15 MG-20MG
1 KIT ORAL
Qty: 0 | Refills: 0 | DISCHARGE
Start: 2020-11-18

## 2020-11-18 RX ORDER — POLYETHYLENE GLYCOL 3350 17 G/17G
17 POWDER, FOR SOLUTION ORAL
Qty: 0 | Refills: 0 | DISCHARGE
Start: 2020-11-18

## 2020-11-18 RX ORDER — SENNA PLUS 8.6 MG/1
2 TABLET ORAL
Qty: 0 | Refills: 0 | DISCHARGE
Start: 2020-11-18

## 2020-11-18 RX ORDER — ACETAMINOPHEN 500 MG
3 TABLET ORAL
Qty: 0 | Refills: 0 | DISCHARGE
Start: 2020-11-18

## 2020-11-18 RX ADMIN — Medication 75 MICROGRAM(S): at 06:22

## 2020-11-18 RX ADMIN — SENNA PLUS 2 TABLET(S): 8.6 TABLET ORAL at 21:51

## 2020-11-18 RX ADMIN — Medication 1 TABLET(S): at 12:59

## 2020-11-18 RX ADMIN — Medication 1 TABLET(S): at 17:58

## 2020-11-18 RX ADMIN — OXYCODONE HYDROCHLORIDE 5 MILLIGRAM(S): 5 TABLET ORAL at 10:25

## 2020-11-18 RX ADMIN — POLYETHYLENE GLYCOL 3350 17 GRAM(S): 17 POWDER, FOR SOLUTION ORAL at 12:59

## 2020-11-18 RX ADMIN — Medication 50 MILLIGRAM(S): at 21:49

## 2020-11-18 RX ADMIN — Medication 1 TABLET(S): at 06:22

## 2020-11-18 RX ADMIN — Medication 975 MILLIGRAM(S): at 15:56

## 2020-11-18 RX ADMIN — RIVAROXABAN 10 MILLIGRAM(S): KIT at 12:59

## 2020-11-18 RX ADMIN — Medication 975 MILLIGRAM(S): at 14:56

## 2020-11-18 RX ADMIN — OXYCODONE HYDROCHLORIDE 5 MILLIGRAM(S): 5 TABLET ORAL at 11:25

## 2020-11-18 RX ADMIN — ESCITALOPRAM OXALATE 5 MILLIGRAM(S): 10 TABLET, FILM COATED ORAL at 12:59

## 2020-11-18 NOTE — PROGRESS NOTE ADULT - ASSESSMENT
A/P:  89y M s/p L Hip hemiarthroplasty POD #2    - Pain control prn  - DVT ppx  - WBAT/PT/OOB as tolerated   - Hip precautions  - FU am labs  - Med mgmt, continue home meds.  - Dispo - [facility] today  - Discussed with Attending.

## 2020-11-18 NOTE — PROGRESS NOTE ADULT - SUBJECTIVE AND OBJECTIVE BOX
Chief Complaint: Fall    Interval Events: Complaining of hip pain.    Review of Systems:  General: No fevers, chills, weight loss or gain  Skin: No rashes, color changes  Cardiovascular: No chest pain, orthopnea  Respiratory: No shortness of breath, cough  Gastrointestinal: No nausea, abdominal pain  Genitourinary: No incontinence, pain with urination  Musculoskeletal: (+) pain, swelling, decreased range of motion  Neurological: No headache, weakness  Psychiatric: No depression, anxiety  Endocrine: No weight loss or gain, increased thirst  All other systems are comprehensively negative.    Physical Exam:  Vital Signs Last 24 Hrs  T(C): 36.4 (18 Nov 2020 05:07), Max: 38 (17 Nov 2020 13:55)  T(F): 97.5 (18 Nov 2020 05:07), Max: 100.4 (17 Nov 2020 13:55)  HR: 78 (18 Nov 2020 05:07) (77 - 97)  BP: 147/65 (18 Nov 2020 05:07) (116/65 - 147/65)  BP(mean): --  RR: 18 (18 Nov 2020 05:07) (18 - 18)  SpO2: 98% (18 Nov 2020 05:07) (96% - 98%)  General: NAD  HEENT: MMM  Neck: No JVD, no carotid bruit  Lungs: CTAB  CV: RRR, nl S1/S2, no M/R/G  Abdomen: S/NT/ND, +BS  Extremities: No LE edema, no cyanosis  Neuro: AAOx3, non-focal  Skin: No rash    Labs:             11-17    140  |  105  |  20  ----------------------------<  134<H>  4.8   |  30  |  0.83    Ca    8.9      17 Nov 2020 07:23                          11.4   14.88 )-----------( 144      ( 17 Nov 2020 07:23 )             33.8

## 2020-11-18 NOTE — PROGRESS NOTE ADULT - SUBJECTIVE AND OBJECTIVE BOX
POD 2 L Jose Hip Arthroplasty  Pt seen and examined. Following commands and conversing, a bit confused stating that she never underwent surgery.     Vital Signs Last 24 Hrs  T(C): 36.4 (18 Nov 2020 05:07), Max: 38 (17 Nov 2020 13:55)  T(F): 97.5 (18 Nov 2020 05:07), Max: 100.4 (17 Nov 2020 13:55)  HR: 78 (18 Nov 2020 05:07) (77 - 97)  BP: 147/65 (18 Nov 2020 05:07) (116/65 - 147/65)  BP(mean): --  RR: 18 (18 Nov 2020 05:07) (18 - 18)  SpO2: 98% (18 Nov 2020 05:07) (96% - 98%)      Exam:  NAD, awake and alert, mildly confused.   Dressing clean and dry  +EHL FHL TA GS  SILT L2-S1  +DP  Calf Soft NT

## 2020-11-18 NOTE — PROGRESS NOTE ADULT - SUBJECTIVE AND OBJECTIVE BOX
Patient is a 89y old  Female who presents with a chief complaint of s/p fall (18 Nov 2020 06:44)      INTERVAL HPI/OVERNIGHT EVENTS: The patient was seen and examined at bedside. No acute events noted overnight. Appears to be in better spirits this morning. Reports LLE pain. Pt admits to feeling otherwise, well. Of note, pt is a limited historian and is unable to provide any reliable history. Unable to obtain ROS either.     MEDICATIONS  (STANDING):  calcium carbonate 1250 mG  + Vitamin D (OsCal 500 + D) 1 Tablet(s) Oral two times a day  escitalopram 5 milliGRAM(s) Oral daily  lactobacillus acidophilus 1 Tablet(s) Oral daily  levothyroxine 75 MICROGram(s) Oral daily  metoprolol succinate ER 50 milliGRAM(s) Oral at bedtime  polyethylene glycol 3350 17 Gram(s) Oral daily  rivaroxaban 10 milliGRAM(s) Oral daily  senna 2 Tablet(s) Oral at bedtime    MEDICATIONS  (PRN):  acetaminophen   Tablet .. 975 milliGRAM(s) Oral every 8 hours PRN Temp greater or equal to 38C (100.4F), Mild Pain (1 - 3)  melatonin 3 milliGRAM(s) Oral at bedtime PRN Insomnia  ondansetron Injectable 4 milliGRAM(s) IV Push every 6 hours PRN Nausea and/or Vomiting  oxyCODONE    IR 2.5 milliGRAM(s) Oral every 4 hours PRN Moderate Pain (4 - 6)  oxyCODONE    IR 5 milliGRAM(s) Oral every 4 hours PRN Severe Pain (7 - 10)      Allergies    latex (Rash)  penicillin (Unknown)  sulfa drugs (Unknown)    Intolerances        REVIEW OF SYSTEMS:  unable to obtain given cognitive impairment post-op    Vital Signs Last 24 Hrs  T(C): 36.4 (18 Nov 2020 05:07), Max: 38 (17 Nov 2020 13:55)  T(F): 97.5 (18 Nov 2020 05:07), Max: 100.4 (17 Nov 2020 13:55)  HR: 78 (18 Nov 2020 05:07) (78 - 97)  BP: 147/65 (18 Nov 2020 05:07) (116/65 - 147/65)  BP(mean): --  RR: 18 (18 Nov 2020 05:07) (18 - 18)  SpO2: 98% (18 Nov 2020 05:07) (96% - 98%)    PHYSICAL EXAM:  GENERAL: NAD, elderly, anxious appearing  HEENT:  anicteric, moist mucous membranes  CHEST/LUNG:  CTA b/l, no rales, wheezes, or rhonchi  HEART:  RRR, S1, S2  ABDOMEN:  BS+, soft, nontender, nondistended  EXTREMITIES: no edema, cyanosis, +minimal b/l LE TTP with no notable erythema; LLE plantar flexion 3/5; clean and dry bandage LLE  NERVOUS SYSTEM: disoriented. Only oriented to person. confused to place and situation    LABS:                        11.2   16.12 )-----------( 159      ( 18 Nov 2020 08:42 )             33.4     CBC Full  -  ( 18 Nov 2020 08:42 )  WBC Count : 16.12 K/uL  Hemoglobin : 11.2 g/dL  Hematocrit : 33.4 %  Platelet Count - Automated : 159 K/uL  Mean Cell Volume : 90.8 fl  Mean Cell Hemoglobin : 30.4 pg  Mean Cell Hemoglobin Concentration : 33.5 gm/dL  Auto Neutrophil # : 11.13 K/uL  Auto Lymphocyte # : 2.91 K/uL  Auto Monocyte # : 1.44 K/uL  Auto Eosinophil # : 0.45 K/uL  Auto Basophil # : 0.03 K/uL  Auto Neutrophil % : 69.5 %  Auto Lymphocyte % : 18.2 %  Auto Monocyte % : 9.0 %  Auto Eosinophil % : 2.8 %  Auto Basophil % : 0.2 %    18 Nov 2020 08:42    140    |  105    |  27     ----------------------------<  94     4.0     |  31     |  0.75     Ca    9.0        18 Nov 2020 08:42          CAPILLARY BLOOD GLUCOSE              RADIOLOGY & ADDITIONAL TESTS:    Personally reviewed.     Consultant(s) Notes Reviewed:  [x] YES  [ ] NO

## 2020-11-18 NOTE — PROGRESS NOTE ADULT - ASSESSMENT
88 y/o F PMH autoimmune hepatitis, HTN, hypothyroidism, and dementia presents w/ mechanical fall, found to have L hip fx, POD 2 L Jose Hip Arthroplasty.    #left subcapital hip fx s/p mechanical fall.   - POD 2 L Jose Hip Arthroplasty  - c/w current pain control regimen (tylenol, Oxy 2.5, and Oxy 5)  - C/w Xarelto 10mg daily for 35 days for post-op DVT/PE prophylaxis   - PT recs [facility]  - neurovascular checks as indicated  - Pt tolerating DASH/TLC diet  - c/w bowel regimen  - vitamin D and calcium   - Ortho following  - Pt some post-op hospital acquired delirium today, recommended frequent reorientation and reassurance. pharmacologic agents not advised at this time    #leukocytosis likely stress induced 2/2 fall  - Pt with increase in WBC 14.88 -->16.12  - Of note, pt with 1 post-op fever of 100.4 (rectal) noted     #Elevated Troponin  - likely in setting of demand ischemia  - do not suspect MI.   - Cardiology, Dr. Ortez, following    #calf pain  - ttp; likely with component of pressure from SCD's   -patients daughter informed of LE duplex findings and that patient should have follow up LE duplex in 1 weeks time as outpatient to further evlauate the incompletely visualized calf veins.     #HTN  - elevated likely due to component of pain as well  - continue Toprol  - c/w current pain control regimen    #HLD  - not on statin, likely 2/2 to history of autoimmune hepatitis    #thrombocytopenia:   - Platelets 144k. Continue to monitor.     #h/o autoimmune hepatitis  - supportive care  - lft's reviewed and wnl  - f/u with GI as outpatient [Dr Miguel A Prater]    #IBS  -daughter reports that patient has history of IBS.   -also incidentally noted to have Bowel Wall thickening on CT of her Pelvis. Suspect it is likely due to her IBS.   -daughter informed that she should have patient f/u with her GI, Dr. Prater as outpatient. Last colonoscopy about 5-7 years ago as per daughter.     #hypothyroidism  - c/w synthroid    #cognitive impairment  - supportive care    #VTE ppx: on Xarelto  #GI ppx not indicated     89 y/o F PMH autoimmune hepatitis, HTN, hypothyroidism, and dementia presents w/ mechanical fall, found to have L hip fx, POD 2 L Jose Hip Arthroplasty.    #left subcapital hip fx s/p mechanical fall.   - POD 2 L Jose Hip Arthroplasty  - c/w current pain control regimen (tylenol, Oxy 2.5, and Oxy 5)  - C/w Xarelto 10mg daily for 35 days for post-op DVT/PE prophylaxis   - PT recs [facility]  - neurovascular checks as indicated  - Pt tolerating DASH/TLC diet  - c/w bowel regimen  - vitamin D and calcium   - Ortho following  - Pt some post-op hospital acquired delirium today, recommended frequent reorientation and reassurance. pharmacologic agents not advised at this time    #leukocytosis likely stress induced 2/2 fall  - Pt with increase in WBC 14.88 -->16.12  - Of note, pt with 1 post-op fever of 100.4 (rectal) noted; Pt afebrile since then  - Procal and differential ordered -- will f/u  - likely 2/2 to pt's post-operative state; Trend temps and CBC    #Elevated Troponin  - likely in setting of demand ischemia  - do not suspect MI.   - Cardiology, Dr. Ortez, following    #calf pain  - ttp; likely with component of pressure from SCD's   -patients daughter informed of LE duplex findings and that patient should have follow up LE duplex in 1 weeks time as outpatient to further evaluate the incompletely visualized calf veins.     #HTN  - elevated likely due to component of pain as well  - continue Toprol  - c/w current pain control regimen    #HLD  - not on statin, likely 2/2 to history of autoimmune hepatitis    #thrombocytopenia:   - Platelets 144k -->159K.  RESOLVED  -Continue to monitor.     #h/o autoimmune hepatitis  - supportive care  - lft's reviewed and wnl  - f/u with GI as outpatient [Dr Miguel A Prater]    #IBS  -daughter reports that patient has history of IBS.   -also incidentally noted to have Bowel Wall thickening on CT of her Pelvis. Suspect it is likely due to her IBS.   -daughter informed that she should have patient f/u with her GI, Dr. Prater as outpatient. Last colonoscopy about 5-7 years ago as per daughter.     #hypothyroidism  - c/w synthroid    #cognitive impairment  - supportive care    #VTE ppx: on Xarelto  #GI ppx not indicated

## 2020-11-19 DIAGNOSIS — S72.002A FRACTURE OF UNSPECIFIED PART OF NECK OF LEFT FEMUR, INITIAL ENCOUNTER FOR CLOSED FRACTURE: ICD-10-CM

## 2020-11-19 DIAGNOSIS — Z29.9 ENCOUNTER FOR PROPHYLACTIC MEASURES, UNSPECIFIED: ICD-10-CM

## 2020-11-19 DIAGNOSIS — R41.89 OTHER SYMPTOMS AND SIGNS INVOLVING COGNITIVE FUNCTIONS AND AWARENESS: ICD-10-CM

## 2020-11-19 DIAGNOSIS — K75.9 INFLAMMATORY LIVER DISEASE, UNSPECIFIED: ICD-10-CM

## 2020-11-19 DIAGNOSIS — R77.8 OTHER SPECIFIED ABNORMALITIES OF PLASMA PROTEINS: ICD-10-CM

## 2020-11-19 DIAGNOSIS — D72.829 ELEVATED WHITE BLOOD CELL COUNT, UNSPECIFIED: ICD-10-CM

## 2020-11-19 DIAGNOSIS — I10 ESSENTIAL (PRIMARY) HYPERTENSION: ICD-10-CM

## 2020-11-19 DIAGNOSIS — Z87.19 PERSONAL HISTORY OF OTHER DISEASES OF THE DIGESTIVE SYSTEM: ICD-10-CM

## 2020-11-19 DIAGNOSIS — E78.5 HYPERLIPIDEMIA, UNSPECIFIED: ICD-10-CM

## 2020-11-19 DIAGNOSIS — E03.9 HYPOTHYROIDISM, UNSPECIFIED: ICD-10-CM

## 2020-11-19 LAB
ANION GAP SERPL CALC-SCNC: 6 MMOL/L — SIGNIFICANT CHANGE UP (ref 5–17)
BASOPHILS # BLD AUTO: 0.03 K/UL — SIGNIFICANT CHANGE UP (ref 0–0.2)
BASOPHILS NFR BLD AUTO: 0.2 % — SIGNIFICANT CHANGE UP (ref 0–2)
BUN SERPL-MCNC: 20 MG/DL — SIGNIFICANT CHANGE UP (ref 7–23)
CALCIUM SERPL-MCNC: 9.1 MG/DL — SIGNIFICANT CHANGE UP (ref 8.5–10.1)
CHLORIDE SERPL-SCNC: 104 MMOL/L — SIGNIFICANT CHANGE UP (ref 96–108)
CO2 SERPL-SCNC: 29 MMOL/L — SIGNIFICANT CHANGE UP (ref 22–31)
CREAT SERPL-MCNC: 0.64 MG/DL — SIGNIFICANT CHANGE UP (ref 0.5–1.3)
EOSINOPHIL # BLD AUTO: 0.5 K/UL — SIGNIFICANT CHANGE UP (ref 0–0.5)
EOSINOPHIL NFR BLD AUTO: 3.1 % — SIGNIFICANT CHANGE UP (ref 0–6)
GLUCOSE SERPL-MCNC: 141 MG/DL — HIGH (ref 70–99)
HCT VFR BLD CALC: 33.7 % — LOW (ref 34.5–45)
HGB BLD-MCNC: 11.3 G/DL — LOW (ref 11.5–15.5)
IMM GRANULOCYTES NFR BLD AUTO: 0.4 % — SIGNIFICANT CHANGE UP (ref 0–1.5)
LYMPHOCYTES # BLD AUTO: 14.4 % — SIGNIFICANT CHANGE UP (ref 13–44)
LYMPHOCYTES # BLD AUTO: 2.31 K/UL — SIGNIFICANT CHANGE UP (ref 1–3.3)
MCHC RBC-ENTMCNC: 31 PG — SIGNIFICANT CHANGE UP (ref 27–34)
MCHC RBC-ENTMCNC: 33.5 GM/DL — SIGNIFICANT CHANGE UP (ref 32–36)
MCV RBC AUTO: 92.3 FL — SIGNIFICANT CHANGE UP (ref 80–100)
MONOCYTES # BLD AUTO: 1.08 K/UL — HIGH (ref 0–0.9)
MONOCYTES NFR BLD AUTO: 6.7 % — SIGNIFICANT CHANGE UP (ref 2–14)
NEUTROPHILS # BLD AUTO: 12.08 K/UL — HIGH (ref 1.8–7.4)
NEUTROPHILS NFR BLD AUTO: 75.2 % — SIGNIFICANT CHANGE UP (ref 43–77)
NRBC # BLD: 0 /100 WBCS — SIGNIFICANT CHANGE UP (ref 0–0)
PLATELET # BLD AUTO: 182 K/UL — SIGNIFICANT CHANGE UP (ref 150–400)
POTASSIUM SERPL-MCNC: 4.1 MMOL/L — SIGNIFICANT CHANGE UP (ref 3.5–5.3)
POTASSIUM SERPL-SCNC: 4.1 MMOL/L — SIGNIFICANT CHANGE UP (ref 3.5–5.3)
PROCALCITONIN SERPL-MCNC: 0.14 NG/ML — HIGH (ref 0–0.04)
RBC # BLD: 3.65 M/UL — LOW (ref 3.8–5.2)
RBC # FLD: 13.8 % — SIGNIFICANT CHANGE UP (ref 10.3–14.5)
SARS-COV-2 RNA SPEC QL NAA+PROBE: SIGNIFICANT CHANGE UP
SODIUM SERPL-SCNC: 139 MMOL/L — SIGNIFICANT CHANGE UP (ref 135–145)
SURGICAL PATHOLOGY STUDY: SIGNIFICANT CHANGE UP
WBC # BLD: 16.06 K/UL — HIGH (ref 3.8–10.5)
WBC # FLD AUTO: 16.06 K/UL — HIGH (ref 3.8–10.5)

## 2020-11-19 PROCEDURE — 99233 SBSQ HOSP IP/OBS HIGH 50: CPT | Mod: GC

## 2020-11-19 RX ADMIN — Medication 1 TABLET(S): at 11:54

## 2020-11-19 RX ADMIN — POLYETHYLENE GLYCOL 3350 17 GRAM(S): 17 POWDER, FOR SOLUTION ORAL at 11:54

## 2020-11-19 RX ADMIN — RIVAROXABAN 10 MILLIGRAM(S): KIT at 11:54

## 2020-11-19 RX ADMIN — Medication 1 TABLET(S): at 05:31

## 2020-11-19 RX ADMIN — Medication 1 TABLET(S): at 17:28

## 2020-11-19 RX ADMIN — Medication 975 MILLIGRAM(S): at 06:30

## 2020-11-19 RX ADMIN — Medication 975 MILLIGRAM(S): at 05:33

## 2020-11-19 RX ADMIN — Medication 50 MILLIGRAM(S): at 21:20

## 2020-11-19 RX ADMIN — Medication 75 MICROGRAM(S): at 05:31

## 2020-11-19 RX ADMIN — SENNA PLUS 2 TABLET(S): 8.6 TABLET ORAL at 21:20

## 2020-11-19 RX ADMIN — ESCITALOPRAM OXALATE 5 MILLIGRAM(S): 10 TABLET, FILM COATED ORAL at 11:54

## 2020-11-19 NOTE — PROGRESS NOTE ADULT - PROBLEM SELECTOR PLAN 2
-Likely stress induced s/p trauma, downtrending to 16.06 today.  -Pt had a fever (100.4F rectally) on POD 1 but has been afebrile since, rectal temp this morning 99.3F. Monitor temps per routine and trend CBC.  -Procal now .14, no pancultures indicated. -reactive  -Pt had a fever (100.4F rectally) on POD 1 but has been afebrile since, rectal temp this morning 99.3F. Monitor temps per routine and trend CBC.  -Procal now .14, no pancultures indicated, unconcerning for infection

## 2020-11-19 NOTE — PROGRESS NOTE ADULT - SUBJECTIVE AND OBJECTIVE BOX
POD 3 L Jose Hip Arthroplasty  Pt seen and examined. Following commands and conversing, her pain is better controlled at this time.     Vital Signs Last 24 Hrs  T(C): 37.3 (19 Nov 2020 04:50), Max: 37.3 (19 Nov 2020 04:50)  T(F): 99.2 (19 Nov 2020 04:50), Max: 99.2 (19 Nov 2020 04:50)  HR: 73 (19 Nov 2020 04:50) (73 - 100)  BP: 154/84 (19 Nov 2020 04:50) (107/75 - 154/84)  BP(mean): --  RR: 18 (19 Nov 2020 04:50) (18 - 18)  SpO2: 99% (19 Nov 2020 04:50) (86% - 99%)      Exam:  NAD, awake and alert  Dressing clean and dry  +EHL FHL TA GS  SILT L2-S1  +DP  Calf Soft NT, compartments soft and compressible

## 2020-11-19 NOTE — PROGRESS NOTE ADULT - PROBLEM SELECTOR PLAN 6
-Supportive Care  -Frequent reorientation as needed. -Supportive Care, melatonin at night PRN.  -Continue Lexapro  -Frequent reorientation as needed.

## 2020-11-19 NOTE — PROGRESS NOTE ADULT - ASSESSMENT
The patient is an 89 year old female with a history of HTN, hypothyroidism, dementia who is admitted with hip fracture.    Plan:  - Troponin elevated to 1.06 and trended down, possibly in the setting of demand ischemia from tachycardia, hypertension, and stress from fracture. Low suspicion for ACS. Not a candidate for further invasive or noninvasive work-up.  - Echo with normal LV systolic function, mild MS, mod AR, mod TR, mod pulm HTN  - LE venous duplex negative for DVT  - BNP elevated at 7915 but no evidence of decompensated heart failure on exam  - Continue aspirin 81 mg daily  - Continue metoprolol succinate 50 mg daily  - Pain control  - PT  - Ortho follow-up  - Discharge planning

## 2020-11-19 NOTE — PROGRESS NOTE ADULT - ASSESSMENT
89 year old Female, PMH Dementia (AAOx1-2 at baseline), autoimmune hepatitis, HTN, hypothyroidism, presented to the ED s/p fall, pt lost balance and fell on to her walker, denies LOC. Admitted for L femoral neck fracture, POD 3 s/p L hemiarthroplasty.

## 2020-11-19 NOTE — PROGRESS NOTE ADULT - PROBLEM SELECTOR PLAN 9
-Daughter reports hx IBS and pt noted to have bowel wall thickening on CTAP, likely IBS.  -GI outpatient follow up, daughter aware and informs that last colonoscopy was 5-7 years ago. -Daughter reports hx IBS and pt noted to have bowel wall thickening on CTAP, likely IBS.  -GI outpatient follow up, daughter aware and informs that last colonoscopy was 5-7 years ago.  -Lactobacillus, Miralax and Senna daily. Zofran PRN.

## 2020-11-19 NOTE — PROGRESS NOTE ADULT - PROBLEM SELECTOR PLAN 1
-POD 3 s/p L hemiarthroplasty  -Continue pain regimen (Tylenol 975mg PO for mild pain, Oxy IR 2.5mg QID for moderate pain and Oxy IR 5mg PO for severe pain)  -Xarelto 10mg daily for 35 days for post-op DVT/PE prophylaxis.  -PT eval and pt to be set up to go to [facility] pending Social Work  -Neurovascular checks as indicated.  -Ortho following pt -POD 3 s/p L hemiarthroplasty  -Continue pain regimen (Tylenol 975mg PO for mild pain, Oxy IR 2.5mg QID for moderate pain and Oxy IR 5mg PO for severe pain)  -Xarelto 10mg daily for 35 days for post-op DVT/PE prophylaxis.  -PT eval and pt to be set up to go to [facility] pending Social Work  -Neurovascular checks as indicated.  -Ortho following pt  -Calcium and Vitamin D -POD 3 s/p L hemiarthroplasty  -Continue pain regimen (Tylenol 975mg PO for mild pain, Oxy IR 2.5mg QID for moderate pain and Oxy IR 5mg PO for severe pain), bowel regimen prn  -Xarelto 10mg daily for 35 days for post-op DVT/PE prophylaxis.  -PT eval and pt to be set up to go to [facility] pending Social Work  -Neurovascular checks as indicated.  -Ortho following pt  -Calcium and Vitamin D

## 2020-11-19 NOTE — PROGRESS NOTE ADULT - ASSESSMENT
A/P:  89y M s/p L Hip hemiarthroplasty POD #3    - Pain control prn  - DVT ppx   - WBAT/PT/OOB as tolerated   - Hip precautions  - FU am labs  - Med mgmt, continue home meds.  - Dispo - [facility] per primary team. Likely d/c today or tomorrow.   - Discussed with Dr Belle. A/P:  89y M s/p L Hip hemiarthroplasty POD #3    - Pain control prn  - DVT ppx   - WBAT/PT/OOB as tolerated   - Hip precautions  - FU am labs  - Med mgmt, continue home meds.  - Dispo - [facility] per primary team. Likely d/c today or tomorrow.   -Ortho stable for discharge, no further intervention planned.   - Discussed with Dr Belle.

## 2020-11-19 NOTE — PROGRESS NOTE ADULT - SUBJECTIVE AND OBJECTIVE BOX
Patient is a 89y old  Female who presents with a chief complaint of s/p fall (19 Nov 2020 06:39)    INTERVAL HPI/OVERNIGHT EVENTS:  No overnight events, pt reports she feels better, less general malaise today, has her appetite back and no L hip pain.     MEDICATIONS  (STANDING):  calcium carbonate 1250 mG  + Vitamin D (OsCal 500 + D) 1 Tablet(s) Oral two times a day  escitalopram 5 milliGRAM(s) Oral daily  lactobacillus acidophilus 1 Tablet(s) Oral daily  levothyroxine 75 MICROGram(s) Oral daily  metoprolol succinate ER 50 milliGRAM(s) Oral at bedtime  polyethylene glycol 3350 17 Gram(s) Oral daily  rivaroxaban 10 milliGRAM(s) Oral daily  senna 2 Tablet(s) Oral at bedtime    MEDICATIONS  (PRN):  acetaminophen   Tablet .. 975 milliGRAM(s) Oral every 8 hours PRN Temp greater or equal to 38C (100.4F), Mild Pain (1 - 3)  melatonin 3 milliGRAM(s) Oral at bedtime PRN Insomnia  ondansetron Injectable 4 milliGRAM(s) IV Push every 6 hours PRN Nausea and/or Vomiting  oxyCODONE    IR 2.5 milliGRAM(s) Oral every 4 hours PRN Moderate Pain (4 - 6)  oxyCODONE    IR 5 milliGRAM(s) Oral every 4 hours PRN Severe Pain (7 - 10)      Allergies  latex (Rash)  penicillin (Unknown)  sulfa drugs (Unknown)      REVIEW OF SYSTEMS:  CONSTITUTIONAL: No fever or chills  HEENT:  No headache, no sore throat  RESPIRATORY: No cough, wheezing, or shortness of breath  CARDIOVASCULAR: No chest pain, palpitations  GASTROINTESTINAL: No abd pain, nausea, vomiting, or diarrhea  GENITOURINARY: No dysuria, frequency, or hematuria  NEUROLOGICAL: no focal weakness or dizziness  MUSCULOSKELETAL: no myalgias     Vital Signs Last 24 Hrs  T(C): 36.7 (19 Nov 2020 12:58), Max: 37.4 (19 Nov 2020 09:12)  T(F): 98.1 (19 Nov 2020 12:58), Max: 99.3 (19 Nov 2020 09:12)  HR: 82 (19 Nov 2020 12:58) (73 - 100)  BP: 153/75 (19 Nov 2020 12:58) (107/75 - 154/84)  BP(mean): --  RR: 17 (19 Nov 2020 12:58) (17 - 18)  SpO2: 92% (19 Nov 2020 12:58) (86% - 99%)    PHYSICAL EXAM:  GENERAL: NAD  HEENT:  anicteric, moist mucous membranes  CHEST/LUNG:  CTA b/l, no rales, wheezes, or rhonchi  HEART:  RRR, S1, S2  ABDOMEN:  BS+, soft, nontender, nondistended  EXTREMITIES: no edema, cyanosis, or calf tenderness  NERVOUS SYSTEM: answers questions and follows commands appropriately    LABS:                        11.3   16.06 )-----------( 182      ( 19 Nov 2020 09:07 )             33.7     CBC Full  -  ( 19 Nov 2020 09:07 )  WBC Count : 16.06 K/uL  Hemoglobin : 11.3 g/dL  Hematocrit : 33.7 %  Platelet Count - Automated : 182 K/uL  Mean Cell Volume : 92.3 fl  Mean Cell Hemoglobin : 31.0 pg  Mean Cell Hemoglobin Concentration : 33.5 gm/dL  Auto Neutrophil # : 12.08 K/uL  Auto Lymphocyte # : 2.31 K/uL  Auto Monocyte # : 1.08 K/uL  Auto Eosinophil # : 0.50 K/uL  Auto Basophil # : 0.03 K/uL  Auto Neutrophil % : 75.2 %  Auto Lymphocyte % : 14.4 %  Auto Monocyte % : 6.7 %  Auto Eosinophil % : 3.1 %  Auto Basophil % : 0.2 %    19 Nov 2020 09:07    139    |  104    |  20     ----------------------------<  141    4.1     |  29     |  0.64     Ca    9.1        19 Nov 2020 09:07          CAPILLARY BLOOD GLUCOSE              RADIOLOGY & ADDITIONAL TESTS:    Personally reviewed.     Consultant(s) Notes Reviewed:  [x] YES  [ ] NO     Patient is a 89y old  Female who presents with a chief complaint of s/p fall (19 Nov 2020 06:39)    INTERVAL HPI/OVERNIGHT EVENTS:  No overnight events, pt reports she feels better, less general malaise today, has her appetite back and no L hip pain.     MEDICATIONS  (STANDING):  calcium carbonate 1250 mG  + Vitamin D (OsCal 500 + D) 1 Tablet(s) Oral two times a day  escitalopram 5 milliGRAM(s) Oral daily  lactobacillus acidophilus 1 Tablet(s) Oral daily  levothyroxine 75 MICROGram(s) Oral daily  metoprolol succinate ER 50 milliGRAM(s) Oral at bedtime  polyethylene glycol 3350 17 Gram(s) Oral daily  rivaroxaban 10 milliGRAM(s) Oral daily  senna 2 Tablet(s) Oral at bedtime    MEDICATIONS  (PRN):  acetaminophen   Tablet .. 975 milliGRAM(s) Oral every 8 hours PRN Temp greater or equal to 38C (100.4F), Mild Pain (1 - 3)  melatonin 3 milliGRAM(s) Oral at bedtime PRN Insomnia  ondansetron Injectable 4 milliGRAM(s) IV Push every 6 hours PRN Nausea and/or Vomiting  oxyCODONE    IR 2.5 milliGRAM(s) Oral every 4 hours PRN Moderate Pain (4 - 6)  oxyCODONE    IR 5 milliGRAM(s) Oral every 4 hours PRN Severe Pain (7 - 10)      Allergies  latex (Rash)  penicillin (Unknown)  sulfa drugs (Unknown)      REVIEW OF SYSTEMS:  CONSTITUTIONAL: Denies fever, chills, malaise.  HEENT:  Denies headache, sore throat.  RESPIRATORY: Denies cough, phlegm, wheezing, or shortness of breath.  CARDIOVASCULAR: Denies chest pain, palpitations.  GASTROINTESTINAL: Denies abdominal pain, nausea, vomiting, diarrhea, constipation.  GENITOURINARY: Denies dysuria, frequency, or hematuria.  NEUROLOGICAL: Denies focal weakness or dizziness.  MUSCULOSKELETAL: Denies L hip/leg pain. No myalgias.    Vital Signs Last 24 Hrs  T(C): 36.7 (19 Nov 2020 12:58), Max: 37.4 (19 Nov 2020 09:12)  T(F): 98.1 (19 Nov 2020 12:58), Max: 99.3 (19 Nov 2020 09:12)  HR: 82 (19 Nov 2020 12:58) (73 - 100)  BP: 153/75 (19 Nov 2020 12:58) (107/75 - 154/84)  BP(mean): --  RR: 17 (19 Nov 2020 12:58) (17 - 18)  SpO2: 92% (19 Nov 2020 12:58) (86% - 99%)    PHYSICAL EXAM:  GENERAL: Pleasant elderly female, in NAD, conversing and answers questions appropriately.  HEENT:  Anicteric, moist mucous membranes.  CHEST/LUNG: CTA b/l, no rales, wheezes, or rhonchi.  HEART:  RRR, S1, S2  ABDOMEN: Normoactive BS+, soft, nontender, nondistended.  EXTREMITIES: L hip surgical site dressing clean/dry/intact. No edema, cyanosis, or calf tenderness.  NERVOUS SYSTEM: Answers questions and follows commands appropriately.    LABS:                        11.3   16.06 )-----------( 182      ( 19 Nov 2020 09:07 )             33.7     CBC Full  -  ( 19 Nov 2020 09:07 )  WBC Count : 16.06 K/uL  Hemoglobin : 11.3 g/dL  Hematocrit : 33.7 %  Platelet Count - Automated : 182 K/uL  Mean Cell Volume : 92.3 fl  Mean Cell Hemoglobin : 31.0 pg  Mean Cell Hemoglobin Concentration : 33.5 gm/dL  Auto Neutrophil # : 12.08 K/uL  Auto Lymphocyte # : 2.31 K/uL  Auto Monocyte # : 1.08 K/uL  Auto Eosinophil # : 0.50 K/uL  Auto Basophil # : 0.03 K/uL  Auto Neutrophil % : 75.2 %  Auto Lymphocyte % : 14.4 %  Auto Monocyte % : 6.7 %  Auto Eosinophil % : 3.1 %  Auto Basophil % : 0.2 %    19 Nov 2020 09:07    139    |  104    |  20     ----------------------------<  141    4.1     |  29     |  0.64     Ca    9.1        19 Nov 2020 09:07          CAPILLARY BLOOD GLUCOSE              RADIOLOGY & ADDITIONAL TESTS:    Personally reviewed.     Consultant(s) Notes Reviewed:  [x] YES  [ ] NO     Patient is a 89y old  Female who presents with a chief complaint of s/p fall (19 Nov 2020 06:39)    INTERVAL HPI/OVERNIGHT EVENTS:  No overnight events, pt reports she feels better, less general malaise today, has her appetite back and no L hip pain.     MEDICATIONS  (STANDING):  calcium carbonate 1250 mG  + Vitamin D (OsCal 500 + D) 1 Tablet(s) Oral two times a day  escitalopram 5 milliGRAM(s) Oral daily  lactobacillus acidophilus 1 Tablet(s) Oral daily  levothyroxine 75 MICROGram(s) Oral daily  metoprolol succinate ER 50 milliGRAM(s) Oral at bedtime  polyethylene glycol 3350 17 Gram(s) Oral daily  rivaroxaban 10 milliGRAM(s) Oral daily  senna 2 Tablet(s) Oral at bedtime    MEDICATIONS  (PRN):  acetaminophen   Tablet .. 975 milliGRAM(s) Oral every 8 hours PRN Temp greater or equal to 38C (100.4F), Mild Pain (1 - 3)  melatonin 3 milliGRAM(s) Oral at bedtime PRN Insomnia  ondansetron Injectable 4 milliGRAM(s) IV Push every 6 hours PRN Nausea and/or Vomiting  oxyCODONE    IR 2.5 milliGRAM(s) Oral every 4 hours PRN Moderate Pain (4 - 6)  oxyCODONE    IR 5 milliGRAM(s) Oral every 4 hours PRN Severe Pain (7 - 10)      Allergies  latex (Rash)  penicillin (Unknown)  sulfa drugs (Unknown)      REVIEW OF SYSTEMS:  CONSTITUTIONAL: Denies fever, chills, malaise.  HEENT:  Denies headache, sore throat.  RESPIRATORY: Denies cough, phlegm, wheezing, or shortness of breath.  CARDIOVASCULAR: Denies chest pain, palpitations.  GASTROINTESTINAL: Denies abdominal pain, nausea, vomiting, diarrhea, constipation.  GENITOURINARY: Denies dysuria, frequency, or hematuria.  NEUROLOGICAL: Denies focal weakness, paresthesias, numbness, or dizziness.  MUSCULOSKELETAL: Denies L hip/leg pain. No myalgias.    Vital Signs Last 24 Hrs  T(C): 36.7 (19 Nov 2020 12:58), Max: 37.4 (19 Nov 2020 09:12)  T(F): 98.1 (19 Nov 2020 12:58), Max: 99.3 (19 Nov 2020 09:12)  HR: 82 (19 Nov 2020 12:58) (73 - 100)  BP: 153/75 (19 Nov 2020 12:58) (107/75 - 154/84)  BP(mean): --  RR: 17 (19 Nov 2020 12:58) (17 - 18)  SpO2: 92% (19 Nov 2020 12:58) (86% - 99%)    PHYSICAL EXAM:  GENERAL: Pleasant elderly female, in NAD, conversing and answers questions appropriately.  HEENT:  Anicteric, moist mucous membranes.  CHEST/LUNG: CTA b/l, no rales, wheezes, or rhonchi.  HEART:  RRR, S1, S2  ABDOMEN: Normoactive BS, soft, nontender, nondistended.  EXTREMITIES: L hip surgical site dressing clean/dry/intact. No edema, cyanosis, or calf tenderness.  NERVOUS SYSTEM: Answers questions and follows commands appropriately.    LABS:                        11.3   16.06 )-----------( 182      ( 19 Nov 2020 09:07 )             33.7     CBC Full  -  ( 19 Nov 2020 09:07 )  WBC Count : 16.06 K/uL  Hemoglobin : 11.3 g/dL  Hematocrit : 33.7 %  Platelet Count - Automated : 182 K/uL  Mean Cell Volume : 92.3 fl  Mean Cell Hemoglobin : 31.0 pg  Mean Cell Hemoglobin Concentration : 33.5 gm/dL  Auto Neutrophil # : 12.08 K/uL  Auto Lymphocyte # : 2.31 K/uL  Auto Monocyte # : 1.08 K/uL  Auto Eosinophil # : 0.50 K/uL  Auto Basophil # : 0.03 K/uL  Auto Neutrophil % : 75.2 %  Auto Lymphocyte % : 14.4 %  Auto Monocyte % : 6.7 %  Auto Eosinophil % : 3.1 %  Auto Basophil % : 0.2 %    19 Nov 2020 09:07    139    |  104    |  20     ----------------------------<  141    4.1     |  29     |  0.64     Ca    9.1        19 Nov 2020 09:07                  RADIOLOGY & ADDITIONAL TESTS:    Personally reviewed.     Consultant(s) Notes Reviewed:  [x] YES  [ ] NO     Patient is a 89y old  Female who presents with a chief complaint of s/p fall (19 Nov 2020 06:39)    INTERVAL HPI/OVERNIGHT EVENTS:  No overnight events, pt reports she feels better, less general malaise today, has her appetite back and no L hip pain.     MEDICATIONS  (STANDING):  calcium carbonate 1250 mG  + Vitamin D (OsCal 500 + D) 1 Tablet(s) Oral two times a day  escitalopram 5 milliGRAM(s) Oral daily  lactobacillus acidophilus 1 Tablet(s) Oral daily  levothyroxine 75 MICROGram(s) Oral daily  metoprolol succinate ER 50 milliGRAM(s) Oral at bedtime  polyethylene glycol 3350 17 Gram(s) Oral daily  rivaroxaban 10 milliGRAM(s) Oral daily  senna 2 Tablet(s) Oral at bedtime    MEDICATIONS  (PRN):  acetaminophen   Tablet .. 975 milliGRAM(s) Oral every 8 hours PRN Temp greater or equal to 38C (100.4F), Mild Pain (1 - 3)  melatonin 3 milliGRAM(s) Oral at bedtime PRN Insomnia  ondansetron Injectable 4 milliGRAM(s) IV Push every 6 hours PRN Nausea and/or Vomiting  oxyCODONE    IR 2.5 milliGRAM(s) Oral every 4 hours PRN Moderate Pain (4 - 6)  oxyCODONE    IR 5 milliGRAM(s) Oral every 4 hours PRN Severe Pain (7 - 10)      Allergies  latex (Rash)  penicillin (Unknown)  sulfa drugs (Unknown)      REVIEW OF SYSTEMS:  CONSTITUTIONAL: Denies fever, chills, malaise.  HEENT:  Denies headache, sore throat.  RESPIRATORY: Denies cough, phlegm, wheezing, or shortness of breath.  CARDIOVASCULAR: Denies chest pain, palpitations.  GASTROINTESTINAL: Denies abdominal pain, nausea, vomiting, diarrhea, constipation.  GENITOURINARY: Denies dysuria, frequency, or hematuria.  NEUROLOGICAL: Denies focal weakness, paresthesias, numbness, or dizziness.  MUSCULOSKELETAL: Denies L hip/leg pain. No myalgias.    Vital Signs Last 24 Hrs  T(C): 36.7 (19 Nov 2020 12:58), Max: 37.4 (19 Nov 2020 09:12)  T(F): 98.1 (19 Nov 2020 12:58), Max: 99.3 (19 Nov 2020 09:12)  HR: 82 (19 Nov 2020 12:58) (73 - 100)  BP: 153/75 (19 Nov 2020 12:58) (107/75 - 154/84)  BP(mean): --  RR: 17 (19 Nov 2020 12:58) (17 - 18)  SpO2: 92% (19 Nov 2020 12:58) (86% - 99%)    PHYSICAL EXAM:  GENERAL: Pleasant elderly female, in NAD, conversing and answers questions appropriately.  HEENT:  Anicteric, moist mucous membranes.  CHEST/LUNG: CTA b/l, no rales, wheezes, or rhonchi.  HEART:  RRR, S1, S2  ABDOMEN: Normoactive BS, soft, nontender, nondistended.  EXTREMITIES: L hip surgical site dressing clean/dry/intact. No edema, cyanosis, or calf tenderness.  NERVOUS SYSTEM: AAOx1, Answers questions and follows commands appropriately.    LABS:                        11.3   16.06 )-----------( 182      ( 19 Nov 2020 09:07 )             33.7     CBC Full  -  ( 19 Nov 2020 09:07 )  WBC Count : 16.06 K/uL  Hemoglobin : 11.3 g/dL  Hematocrit : 33.7 %  Platelet Count - Automated : 182 K/uL  Mean Cell Volume : 92.3 fl  Mean Cell Hemoglobin : 31.0 pg  Mean Cell Hemoglobin Concentration : 33.5 gm/dL  Auto Neutrophil # : 12.08 K/uL  Auto Lymphocyte # : 2.31 K/uL  Auto Monocyte # : 1.08 K/uL  Auto Eosinophil # : 0.50 K/uL  Auto Basophil # : 0.03 K/uL  Auto Neutrophil % : 75.2 %  Auto Lymphocyte % : 14.4 %  Auto Monocyte % : 6.7 %  Auto Eosinophil % : 3.1 %  Auto Basophil % : 0.2 %    19 Nov 2020 09:07    139    |  104    |  20     ----------------------------<  141    4.1     |  29     |  0.64     Ca    9.1        19 Nov 2020 09:07                  RADIOLOGY & ADDITIONAL TESTS:    Personally reviewed.     Consultant(s) Notes Reviewed:  [x] YES  [ ] NO

## 2020-11-19 NOTE — PROGRESS NOTE ADULT - SUBJECTIVE AND OBJECTIVE BOX
Chief Complaint: Fall    Interval Events: No events overnight.    Review of Systems:  General: No fevers, chills, weight loss or gain  Skin: No rashes, color changes  Cardiovascular: No chest pain, orthopnea  Respiratory: No shortness of breath, cough  Gastrointestinal: No nausea, abdominal pain  Genitourinary: No incontinence, pain with urination  Musculoskeletal: (+) pain, swelling, decreased range of motion  Neurological: No headache, weakness  Psychiatric: No depression, anxiety  Endocrine: No weight loss or gain, increased thirst  All other systems are comprehensively negative.    Physical Exam:  Vital Signs Last 24 Hrs  T(C): 37.3 (19 Nov 2020 04:50), Max: 37.3 (19 Nov 2020 04:50)  T(F): 99.2 (19 Nov 2020 04:50), Max: 99.2 (19 Nov 2020 04:50)  HR: 73 (19 Nov 2020 04:50) (73 - 100)  BP: 154/84 (19 Nov 2020 04:50) (107/75 - 154/84)  BP(mean): --  RR: 18 (19 Nov 2020 04:50) (18 - 18)  SpO2: 99% (19 Nov 2020 04:50) (86% - 99%)  General: NAD  HEENT: MMM  Neck: No JVD, no carotid bruit  Lungs: CTAB  CV: RRR, nl S1/S2, no M/R/G  Abdomen: S/NT/ND, +BS  Extremities: No LE edema, no cyanosis  Neuro: AAOx3, non-focal  Skin: No rash    Labs:             11-18    140  |  105  |  27<H>  ----------------------------<  94  4.0   |  31  |  0.75    Ca    9.0      18 Nov 2020 08:42                          11.2   16.12 )-----------( 159      ( 18 Nov 2020 08:42 )             33.4

## 2020-11-20 LAB
ANION GAP SERPL CALC-SCNC: 6 MMOL/L — SIGNIFICANT CHANGE UP (ref 5–17)
BASOPHILS # BLD AUTO: 0.04 K/UL — SIGNIFICANT CHANGE UP (ref 0–0.2)
BASOPHILS NFR BLD AUTO: 0.3 % — SIGNIFICANT CHANGE UP (ref 0–2)
BUN SERPL-MCNC: 14 MG/DL — SIGNIFICANT CHANGE UP (ref 7–23)
CALCIUM SERPL-MCNC: 8.6 MG/DL — SIGNIFICANT CHANGE UP (ref 8.5–10.1)
CHLORIDE SERPL-SCNC: 103 MMOL/L — SIGNIFICANT CHANGE UP (ref 96–108)
CO2 SERPL-SCNC: 29 MMOL/L — SIGNIFICANT CHANGE UP (ref 22–31)
CREAT SERPL-MCNC: 0.6 MG/DL — SIGNIFICANT CHANGE UP (ref 0.5–1.3)
EOSINOPHIL # BLD AUTO: 0.34 K/UL — SIGNIFICANT CHANGE UP (ref 0–0.5)
EOSINOPHIL NFR BLD AUTO: 2.2 % — SIGNIFICANT CHANGE UP (ref 0–6)
GLUCOSE SERPL-MCNC: 117 MG/DL — HIGH (ref 70–99)
HCT VFR BLD CALC: 34.9 % — SIGNIFICANT CHANGE UP (ref 34.5–45)
HGB BLD-MCNC: 11.8 G/DL — SIGNIFICANT CHANGE UP (ref 11.5–15.5)
IMM GRANULOCYTES NFR BLD AUTO: 0.5 % — SIGNIFICANT CHANGE UP (ref 0–1.5)
LYMPHOCYTES # BLD AUTO: 16.6 % — SIGNIFICANT CHANGE UP (ref 13–44)
LYMPHOCYTES # BLD AUTO: 2.52 K/UL — SIGNIFICANT CHANGE UP (ref 1–3.3)
MCHC RBC-ENTMCNC: 30.3 PG — SIGNIFICANT CHANGE UP (ref 27–34)
MCHC RBC-ENTMCNC: 33.8 GM/DL — SIGNIFICANT CHANGE UP (ref 32–36)
MCV RBC AUTO: 89.5 FL — SIGNIFICANT CHANGE UP (ref 80–100)
MONOCYTES # BLD AUTO: 1.07 K/UL — HIGH (ref 0–0.9)
MONOCYTES NFR BLD AUTO: 7.1 % — SIGNIFICANT CHANGE UP (ref 2–14)
NEUTROPHILS # BLD AUTO: 11.12 K/UL — HIGH (ref 1.8–7.4)
NEUTROPHILS NFR BLD AUTO: 73.3 % — SIGNIFICANT CHANGE UP (ref 43–77)
NRBC # BLD: 0 /100 WBCS — SIGNIFICANT CHANGE UP (ref 0–0)
PLATELET # BLD AUTO: 233 K/UL — SIGNIFICANT CHANGE UP (ref 150–400)
POTASSIUM SERPL-MCNC: 4.4 MMOL/L — SIGNIFICANT CHANGE UP (ref 3.5–5.3)
POTASSIUM SERPL-SCNC: 4.4 MMOL/L — SIGNIFICANT CHANGE UP (ref 3.5–5.3)
RBC # BLD: 3.9 M/UL — SIGNIFICANT CHANGE UP (ref 3.8–5.2)
RBC # FLD: 14 % — SIGNIFICANT CHANGE UP (ref 10.3–14.5)
SODIUM SERPL-SCNC: 138 MMOL/L — SIGNIFICANT CHANGE UP (ref 135–145)
T3 SERPL-MCNC: 55 NG/DL — LOW (ref 80–200)
T4 AB SER-ACNC: 6.4 UG/DL — SIGNIFICANT CHANGE UP (ref 4.6–12)
TSH SERPL-MCNC: 1.41 UIU/ML — SIGNIFICANT CHANGE UP (ref 0.36–3.74)
WBC # BLD: 15.16 K/UL — HIGH (ref 3.8–10.5)
WBC # FLD AUTO: 15.16 K/UL — HIGH (ref 3.8–10.5)

## 2020-11-20 PROCEDURE — 99233 SBSQ HOSP IP/OBS HIGH 50: CPT | Mod: GC

## 2020-11-20 PROCEDURE — 99497 ADVNCD CARE PLAN 30 MIN: CPT

## 2020-11-20 RX ADMIN — Medication 1 TABLET(S): at 11:50

## 2020-11-20 RX ADMIN — POLYETHYLENE GLYCOL 3350 17 GRAM(S): 17 POWDER, FOR SOLUTION ORAL at 11:50

## 2020-11-20 RX ADMIN — Medication 50 MILLIGRAM(S): at 21:11

## 2020-11-20 RX ADMIN — ESCITALOPRAM OXALATE 5 MILLIGRAM(S): 10 TABLET, FILM COATED ORAL at 11:50

## 2020-11-20 RX ADMIN — Medication 75 MICROGRAM(S): at 05:19

## 2020-11-20 RX ADMIN — SENNA PLUS 2 TABLET(S): 8.6 TABLET ORAL at 21:11

## 2020-11-20 RX ADMIN — RIVAROXABAN 10 MILLIGRAM(S): KIT at 11:50

## 2020-11-20 RX ADMIN — Medication 1 TABLET(S): at 05:19

## 2020-11-20 RX ADMIN — Medication 1 TABLET(S): at 17:40

## 2020-11-20 NOTE — PROGRESS NOTE ADULT - SUBJECTIVE AND OBJECTIVE BOX
POD 4 L Jose Hip Arthroplasty  Pt seen and examined. Following commands and conversing, her pain is better controlled at this time.     Vital Signs Last 24 Hrs  T(C): 36.8 (20 Nov 2020 04:50), Max: 37.7 (19 Nov 2020 21:04)  T(F): 98.3 (20 Nov 2020 04:50), Max: 99.8 (19 Nov 2020 21:04)  HR: 74 (20 Nov 2020 04:50) (74 - 103)  BP: 154/79 (20 Nov 2020 04:50) (151/70 - 154/79)  BP(mean): --  RR: 18 (20 Nov 2020 04:50) (17 - 18)  SpO2: 96% (20 Nov 2020 04:50) (92% - 96%)    Exam:  NAD, awake and alert  Dressing clean and dry  +EHL FHL TA GS  SILT L2-S1  +DP  Calf Soft NT, compartments soft and compressible

## 2020-11-20 NOTE — PROGRESS NOTE ADULT - ASSESSMENT
89 year old Female, PMH Dementia (AAOx1-2 at baseline), autoimmune hepatitis, HTN, hypothyroidism, presented to the ED s/p fall, pt lost balance and fell on to her walker, denies LOC. Admitted for L femoral neck fracture, POD 4 s/p L hemiarthroplasty.

## 2020-11-20 NOTE — PROGRESS NOTE ADULT - SUBJECTIVE AND OBJECTIVE BOX
Patient is a 89y old  Female who presents with a chief complaint of s/p fall (20 Nov 2020 07:34)      INTERVAL HPI/OVERNIGHT EVENTS: The patient was seen and examined at bedside. No acute events over night. Pt denies any acute complaints today. States she is not in pain. Of note, pt is a limited historian and is unable to provide any reliable history. Unable to obtain ROS either.     MEDICATIONS  (STANDING):  calcium carbonate 1250 mG  + Vitamin D (OsCal 500 + D) 1 Tablet(s) Oral two times a day  escitalopram 5 milliGRAM(s) Oral daily  lactobacillus acidophilus 1 Tablet(s) Oral daily  levothyroxine 75 MICROGram(s) Oral daily  metoprolol succinate ER 50 milliGRAM(s) Oral at bedtime  polyethylene glycol 3350 17 Gram(s) Oral daily  rivaroxaban 10 milliGRAM(s) Oral daily  senna 2 Tablet(s) Oral at bedtime    MEDICATIONS  (PRN):  acetaminophen   Tablet .. 975 milliGRAM(s) Oral every 8 hours PRN Temp greater or equal to 38C (100.4F), Mild Pain (1 - 3)  melatonin 3 milliGRAM(s) Oral at bedtime PRN Insomnia  ondansetron Injectable 4 milliGRAM(s) IV Push every 6 hours PRN Nausea and/or Vomiting  oxyCODONE    IR 2.5 milliGRAM(s) Oral every 4 hours PRN Moderate Pain (4 - 6)  oxyCODONE    IR 5 milliGRAM(s) Oral every 4 hours PRN Severe Pain (7 - 10)      Allergies    latex (Rash)  penicillin (Unknown)  sulfa drugs (Unknown)    Intolerances        REVIEW OF SYSTEMS:  Unreliable ROS    Vital Signs Last 24 Hrs  T(C): 36.8 (20 Nov 2020 04:50), Max: 37.7 (19 Nov 2020 21:04)  T(F): 98.3 (20 Nov 2020 04:50), Max: 99.8 (19 Nov 2020 21:04)  HR: 87 (20 Nov 2020 09:32) (74 - 103)  BP: 143/68 (20 Nov 2020 09:32) (143/68 - 154/79)  BP(mean): --  RR: 18 (20 Nov 2020 04:50) (17 - 18)  SpO2: 92% (20 Nov 2020 09:32) (91% - 96%)    PHYSICAL EXAM:  GENERAL: NAD, elderly, resting comfortably in bed  HEENT:  anicteric, moist mucous membranes  CHEST/LUNG:  CTA b/l, no rales, wheezes, or rhonchi  HEART:  RRR, S1, S2  ABDOMEN:  BS+, soft, nontender, nondistended  EXTREMITIES: no edema, cyanosis, +minimal b/l LE TTP with no notable erythema; LLE plantar flexion 3/5; clean and dry bandage LLE  NERVOUS SYSTEM: disoriented. Only oriented to person. confused to place and situation    LABS:                        11.8   15.16 )-----------( 233      ( 20 Nov 2020 09:19 )             34.9     CBC Full  -  ( 20 Nov 2020 09:19 )  WBC Count : 15.16 K/uL  Hemoglobin : 11.8 g/dL  Hematocrit : 34.9 %  Platelet Count - Automated : 233 K/uL  Mean Cell Volume : 89.5 fl  Mean Cell Hemoglobin : 30.3 pg  Mean Cell Hemoglobin Concentration : 33.8 gm/dL  Auto Neutrophil # : 11.12 K/uL  Auto Lymphocyte # : 2.52 K/uL  Auto Monocyte # : 1.07 K/uL  Auto Eosinophil # : 0.34 K/uL  Auto Basophil # : 0.04 K/uL  Auto Neutrophil % : 73.3 %  Auto Lymphocyte % : 16.6 %  Auto Monocyte % : 7.1 %  Auto Eosinophil % : 2.2 %  Auto Basophil % : 0.3 %    20 Nov 2020 09:19    138    |  103    |  14     ----------------------------<  117    4.4     |  29     |  0.60     Ca    8.6        20 Nov 2020 09:19          CAPILLARY BLOOD GLUCOSE              RADIOLOGY & ADDITIONAL TESTS:    Personally reviewed.     Consultant(s) Notes Reviewed:  [x] YES  [ ] NO

## 2020-11-20 NOTE — GOALS OF CARE CONVERSATION - ADVANCED CARE PLANNING - CONVERSATION DETAILS
Writer spoke with.dtr/surrogate Amina. Reviewed patient's medical and social history as well as events leading to patient's hospitalization. Writer discussed patient's current diagnosis (IBS,hepatitis,cognitive impairment,fx hip) medical condition and management,. Inquired about patient's wishes regarding extent of medical care to be provided including escalation of medical care into the ICU and use of vasopressor support. In addition, the writer inquired about thoughts regarding cardiopulmnary resuscitation, artificial nutrition and hydration including use of feeding tubes and IVF, antibiotics, and further investigative studies such as blood draws and radiology. Amina showed good insight into medical condition. All questions answered. Writer recommended MOL[facility].Dtr Amina consented to DNR/DNI .... status. MOLST form filled out and placed in chart.

## 2020-11-20 NOTE — PROGRESS NOTE ADULT - PROBLEM SELECTOR PLAN 1
-POD 4 s/p L hemiarthroplasty  -Continue pain regimen (Tylenol 975mg PO for mild pain, Oxy IR 2.5mg QID for moderate pain and Oxy IR 5mg PO for severe pain), bowel regimen prn  -Xarelto 10mg daily for 35 days for post-op DVT/PE prophylaxis.  -PT eval and pt to be set up to go to [facility] pending Social Work  -Neurovascular checks as indicated.  -Ortho following pt  -Calcium and Vitamin D

## 2020-11-20 NOTE — PROGRESS NOTE ADULT - ASSESSMENT
A/P:  89y M s/p L Hip hemiarthroplasty POD #4    - Pain control prn  - DVT ppx   - WBAT/PT/OOB as tolerated   - Hip precautions  - FU am labs  - Med mgmt, continue home meds.  - Dispo - [facility] per primary team. Likely d/c today or tomorrow.   -Ortho stable for discharge, no further intervention planned.   - Discussed with Dr Belle.

## 2020-11-20 NOTE — PROGRESS NOTE ADULT - PROBLEM SELECTOR PLAN 2
-reactive  -Pt had a fever (100.4F rectally) on POD 1 but has been afebrile since, rectal temp this morning 99.3F. Monitor temps per routine and trend CBC.  - unconcerning for infection at this time

## 2020-11-20 NOTE — PROGRESS NOTE ADULT - PROBLEM SELECTOR PLAN 9
-Daughter reports hx IBS and pt noted to have bowel wall thickening on CTAP, likely IBS.  -GI outpatient follow up, daughter aware and informs that last colonoscopy was 5-7 years ago.  -Lactobacillus, Miralax and Senna daily. Zofran PRN.

## 2020-11-21 LAB
ANION GAP SERPL CALC-SCNC: 5 MMOL/L — SIGNIFICANT CHANGE UP (ref 5–17)
BASOPHILS # BLD AUTO: 0.04 K/UL — SIGNIFICANT CHANGE UP (ref 0–0.2)
BASOPHILS NFR BLD AUTO: 0.3 % — SIGNIFICANT CHANGE UP (ref 0–2)
BUN SERPL-MCNC: 17 MG/DL — SIGNIFICANT CHANGE UP (ref 7–23)
CALCIUM SERPL-MCNC: 8.8 MG/DL — SIGNIFICANT CHANGE UP (ref 8.5–10.1)
CHLORIDE SERPL-SCNC: 102 MMOL/L — SIGNIFICANT CHANGE UP (ref 96–108)
CO2 SERPL-SCNC: 32 MMOL/L — HIGH (ref 22–31)
CREAT SERPL-MCNC: 0.62 MG/DL — SIGNIFICANT CHANGE UP (ref 0.5–1.3)
EOSINOPHIL # BLD AUTO: 0.4 K/UL — SIGNIFICANT CHANGE UP (ref 0–0.5)
EOSINOPHIL NFR BLD AUTO: 3.2 % — SIGNIFICANT CHANGE UP (ref 0–6)
GLUCOSE SERPL-MCNC: 98 MG/DL — SIGNIFICANT CHANGE UP (ref 70–99)
HCT VFR BLD CALC: 30.4 % — LOW (ref 34.5–45)
HGB BLD-MCNC: 10.5 G/DL — LOW (ref 11.5–15.5)
IMM GRANULOCYTES NFR BLD AUTO: 0.6 % — SIGNIFICANT CHANGE UP (ref 0–1.5)
LYMPHOCYTES # BLD AUTO: 18.8 % — SIGNIFICANT CHANGE UP (ref 13–44)
LYMPHOCYTES # BLD AUTO: 2.38 K/UL — SIGNIFICANT CHANGE UP (ref 1–3.3)
MCHC RBC-ENTMCNC: 30.8 PG — SIGNIFICANT CHANGE UP (ref 27–34)
MCHC RBC-ENTMCNC: 34.5 GM/DL — SIGNIFICANT CHANGE UP (ref 32–36)
MCV RBC AUTO: 89.1 FL — SIGNIFICANT CHANGE UP (ref 80–100)
MONOCYTES # BLD AUTO: 1.18 K/UL — HIGH (ref 0–0.9)
MONOCYTES NFR BLD AUTO: 9.3 % — SIGNIFICANT CHANGE UP (ref 2–14)
NEUTROPHILS # BLD AUTO: 8.59 K/UL — HIGH (ref 1.8–7.4)
NEUTROPHILS NFR BLD AUTO: 67.8 % — SIGNIFICANT CHANGE UP (ref 43–77)
NRBC # BLD: 0 /100 WBCS — SIGNIFICANT CHANGE UP (ref 0–0)
PLATELET # BLD AUTO: 231 K/UL — SIGNIFICANT CHANGE UP (ref 150–400)
POTASSIUM SERPL-MCNC: 4.3 MMOL/L — SIGNIFICANT CHANGE UP (ref 3.5–5.3)
POTASSIUM SERPL-SCNC: 4.3 MMOL/L — SIGNIFICANT CHANGE UP (ref 3.5–5.3)
RBC # BLD: 3.41 M/UL — LOW (ref 3.8–5.2)
RBC # FLD: 13.9 % — SIGNIFICANT CHANGE UP (ref 10.3–14.5)
SODIUM SERPL-SCNC: 139 MMOL/L — SIGNIFICANT CHANGE UP (ref 135–145)
WBC # BLD: 12.67 K/UL — HIGH (ref 3.8–10.5)
WBC # FLD AUTO: 12.67 K/UL — HIGH (ref 3.8–10.5)

## 2020-11-21 PROCEDURE — 99232 SBSQ HOSP IP/OBS MODERATE 35: CPT

## 2020-11-21 RX ADMIN — POLYETHYLENE GLYCOL 3350 17 GRAM(S): 17 POWDER, FOR SOLUTION ORAL at 11:31

## 2020-11-21 RX ADMIN — ESCITALOPRAM OXALATE 5 MILLIGRAM(S): 10 TABLET, FILM COATED ORAL at 11:31

## 2020-11-21 RX ADMIN — RIVAROXABAN 10 MILLIGRAM(S): KIT at 11:31

## 2020-11-21 RX ADMIN — Medication 1 TABLET(S): at 05:08

## 2020-11-21 RX ADMIN — Medication 1 TABLET(S): at 11:31

## 2020-11-21 RX ADMIN — Medication 50 MILLIGRAM(S): at 21:48

## 2020-11-21 RX ADMIN — Medication 1 TABLET(S): at 17:13

## 2020-11-21 RX ADMIN — SENNA PLUS 2 TABLET(S): 8.6 TABLET ORAL at 21:48

## 2020-11-21 RX ADMIN — Medication 75 MICROGRAM(S): at 05:08

## 2020-11-21 NOTE — PROGRESS NOTE ADULT - ASSESSMENT
89 year old Female, PMH Dementia (AAOx1-2 at baseline), autoimmune hepatitis, HTN, hypothyroidism, presented to the ED s/p fall, pt lost balance and fell on to her walker, denies LOC. Admitted for L femoral neck fracture, POD 5 s/p L hemiarthroplasty.

## 2020-11-21 NOTE — PROGRESS NOTE ADULT - PROBLEM SELECTOR PLAN 1
-POD 4 s/p L hemiarthroplasty  -Continue pain regimen (Tylenol 975mg PO for mild pain, Oxy IR 2.5mg QID for moderate pain and Oxy IR 5mg PO for severe pain), bowel regimen prn  -Xarelto 10mg daily for 35 days for post-op DVT/PE prophylaxis.  -PT eval and pt to be set up to go to [facility] pending Social Work  -Neurovascular checks as indicated.  -Ortho following pt  -Calcium and Vitamin D

## 2020-11-21 NOTE — PROGRESS NOTE ADULT - SUBJECTIVE AND OBJECTIVE BOX
POD 5 L Jose Hip Arthroplasty  Pt seen and examined. No acute events overnight, patient doing well. Her pain is controlled at this time.     Vital Signs Last 24 Hrs  T(C): 36.8 (21 Nov 2020 04:55), Max: 37.2 (20 Nov 2020 13:13)  T(F): 98.3 (21 Nov 2020 04:55), Max: 98.9 (20 Nov 2020 13:13)  HR: 78 (21 Nov 2020 04:55) (74 - 87)  BP: 144/65 (21 Nov 2020 04:55) (111/57 - 144/65)  BP(mean): --  RR: 17 (21 Nov 2020 04:55) (16 - 18)  SpO2: 95% (21 Nov 2020 04:55) (91% - 95%)    Exam:  NAD, awake and alert  Dressing clean and dry  +EHL FHL TA GS  SILT L2-S1  +DP  Calf Soft NT, compartments soft and compressible

## 2020-11-21 NOTE — PROGRESS NOTE ADULT - SUBJECTIVE AND OBJECTIVE BOX
Chief Complaint: Fall    Interval Events: No events overnight.    Review of Systems:  General: No fevers, chills, weight loss or gain  Skin: No rashes, color changes  Cardiovascular: No chest pain, orthopnea  Respiratory: No shortness of breath, cough  Gastrointestinal: No nausea, abdominal pain  Genitourinary: No incontinence, pain with urination  Musculoskeletal: No pain, swelling, decreased range of motion  Neurological: No headache, weakness  Psychiatric: No depression, anxiety  Endocrine: No weight loss or gain, increased thirst  All other systems are comprehensively negative.    Physical Exam:  Vital Signs Last 24 Hrs  T(C): 36.8 (21 Nov 2020 04:55), Max: 37.2 (20 Nov 2020 13:13)  T(F): 98.3 (21 Nov 2020 04:55), Max: 98.9 (20 Nov 2020 13:13)  HR: 78 (21 Nov 2020 04:55) (74 - 87)  BP: 144/65 (21 Nov 2020 04:55) (111/57 - 144/65)  BP(mean): --  RR: 17 (21 Nov 2020 04:55) (16 - 18)  SpO2: 95% (21 Nov 2020 04:55) (91% - 95%)  General: NAD  HEENT: MMM  Neck: No JVD, no carotid bruit  Lungs: CTAB  CV: RRR, nl S1/S2, no M/R/G  Abdomen: S/NT/ND, +BS  Extremities: No LE edema, no cyanosis  Neuro: AAOx3, non-focal  Skin: No rash    Labs:             11-21    139  |  102  |  17  ----------------------------<  98  4.3   |  32<H>  |  0.62    Ca    8.8      21 Nov 2020 07:54                          10.5   12.67 )-----------( 231      ( 21 Nov 2020 07:54 )             30.4

## 2020-11-21 NOTE — DIETITIAN INITIAL EVALUATION ADULT. - OTHER INFO
Per RN, needs assistance to eat. Fair po intake per EMR. S/p hip fx, with hemiarthroplasty. BM yesterday. Pt agreeable to snacks to help increase intake(yogurt/puddings). Preferences obtained.

## 2020-11-21 NOTE — PROGRESS NOTE ADULT - ASSESSMENT
A/P:  89y M s/p L Hip hemiarthroplasty POD #5    - Pain control prn  - DVT ppx   - WBAT/PT/OOB as tolerated   - Hip precautions  - FU am labs  - Med mgmt, continue home meds.  - Dispo - AMAN per primary team. Likely d/c today or tomorrow pending availability   -Ortho stable for discharge, no further intervention planned.   - Patient should follow up with Dr. Belle after discharge  - Discussed with Dr Belle.

## 2020-11-21 NOTE — PROGRESS NOTE ADULT - ASSESSMENT
The patient is an 89 year old female with a history of HTN, hypothyroidism, dementia who is admitted with hip fracture.    Plan:  - Troponin elevated to 1.06 and trended down, possibly in the setting of demand ischemia from tachycardia, hypertension, and stress from fracture. Low suspicion for ACS. Not a candidate for further invasive or noninvasive work-up.  - Echo with normal LV systolic function, mild MS, mod AR, mod TR, mod pulm HTN  - LE venous duplex negative for DVT  - Continue aspirin 81 mg daily  - Continue metoprolol succinate 50 mg daily  - Ortho follow-up  - Discharge planning

## 2020-11-21 NOTE — PROGRESS NOTE ADULT - SUBJECTIVE AND OBJECTIVE BOX
Patient is a 89y old  Female who presents with a chief complaint of s/p fall (21 Nov 2020 11:44)      INTERVAL HPI: Pt seen and examined. States she is feeling anxious today but looking forward to getting started with rehab. Denies any acute complaints at this time other than wanting to be cleaned up after having a bm just now.     OVERNIGHT EVENTS: none noted  T(F): 98.8 (11-21-20 @ 13:02), Max: 98.8 (11-21-20 @ 13:02)  HR: 81 (11-21-20 @ 13:02) (78 - 81)  BP: 135/60 (11-21-20 @ 13:02) (126/65 - 144/65)  RR: 18 (11-21-20 @ 13:02) (17 - 18)  SpO2: 93% (11-21-20 @ 13:02) (93% - 95%)  I&O's Summary    21 Nov 2020 07:01  -  21 Nov 2020 15:43  --------------------------------------------------------  IN: 200 mL / OUT: 100 mL / NET: 100 mL        REVIEW OF SYSTEMS: negative other than that stated in  hpi    PHYSICAL EXAM:  GENERAL: NAD, elderly, resting comfortably in bed  HEENT:  anicteric, moist mucous membranes  CHEST/LUNG:  CTA b/l, no rales, wheezes, or rhonchi  HEART:  RRR, S1, S2  ABDOMEN:  BS+, soft, nontender, nondistended  EXTREMITIES: no edema, cyanosis, +minimal b/l LE TTP with no notable erythema; LLE plantar flexion 3/5; clean and dry bandage LLE  NERVOUS SYSTEM: disoriented. Only oriented to person. confused to place and situation      LABS:                        10.5   12.67 )-----------( 231      ( 21 Nov 2020 07:54 )             30.4     11-21    139  |  102  |  17  ----------------------------<  98  4.3   |  32<H>  |  0.62    Ca    8.8      21 Nov 2020 07:54          CAPILLARY BLOOD GLUCOSE                  MEDICATIONS  (STANDING):  calcium carbonate 1250 mG  + Vitamin D (OsCal 500 + D) 1 Tablet(s) Oral two times a day  escitalopram 5 milliGRAM(s) Oral daily  lactobacillus acidophilus 1 Tablet(s) Oral daily  levothyroxine 75 MICROGram(s) Oral daily  metoprolol succinate ER 50 milliGRAM(s) Oral at bedtime  polyethylene glycol 3350 17 Gram(s) Oral daily  rivaroxaban 10 milliGRAM(s) Oral daily  senna 2 Tablet(s) Oral at bedtime    MEDICATIONS  (PRN):  acetaminophen   Tablet .. 975 milliGRAM(s) Oral every 8 hours PRN Temp greater or equal to 38C (100.4F), Mild Pain (1 - 3)  melatonin 3 milliGRAM(s) Oral at bedtime PRN Insomnia  ondansetron Injectable 4 milliGRAM(s) IV Push every 6 hours PRN Nausea and/or Vomiting  oxyCODONE    IR 2.5 milliGRAM(s) Oral every 4 hours PRN Moderate Pain (4 - 6)  oxyCODONE    IR 5 milliGRAM(s) Oral every 4 hours PRN Severe Pain (7 - 10)

## 2020-11-22 LAB
ALBUMIN SERPL ELPH-MCNC: 2.3 G/DL — LOW (ref 3.3–5)
ALP SERPL-CCNC: 95 U/L — SIGNIFICANT CHANGE UP (ref 40–120)
ALT FLD-CCNC: 34 U/L — SIGNIFICANT CHANGE UP (ref 12–78)
ANION GAP SERPL CALC-SCNC: 5 MMOL/L — SIGNIFICANT CHANGE UP (ref 5–17)
AST SERPL-CCNC: 33 U/L — SIGNIFICANT CHANGE UP (ref 15–37)
BASOPHILS # BLD AUTO: 0.03 K/UL — SIGNIFICANT CHANGE UP (ref 0–0.2)
BASOPHILS NFR BLD AUTO: 0.3 % — SIGNIFICANT CHANGE UP (ref 0–2)
BILIRUB SERPL-MCNC: 0.7 MG/DL — SIGNIFICANT CHANGE UP (ref 0.2–1.2)
BUN SERPL-MCNC: 9 MG/DL — SIGNIFICANT CHANGE UP (ref 7–23)
CALCIUM SERPL-MCNC: 8.6 MG/DL — SIGNIFICANT CHANGE UP (ref 8.5–10.1)
CHLORIDE SERPL-SCNC: 102 MMOL/L — SIGNIFICANT CHANGE UP (ref 96–108)
CO2 SERPL-SCNC: 31 MMOL/L — SIGNIFICANT CHANGE UP (ref 22–31)
CREAT SERPL-MCNC: 0.57 MG/DL — SIGNIFICANT CHANGE UP (ref 0.5–1.3)
EOSINOPHIL # BLD AUTO: 0.28 K/UL — SIGNIFICANT CHANGE UP (ref 0–0.5)
EOSINOPHIL NFR BLD AUTO: 2.4 % — SIGNIFICANT CHANGE UP (ref 0–6)
GLUCOSE SERPL-MCNC: 91 MG/DL — SIGNIFICANT CHANGE UP (ref 70–99)
HCT VFR BLD CALC: 35.5 % — SIGNIFICANT CHANGE UP (ref 34.5–45)
HGB BLD-MCNC: 11.9 G/DL — SIGNIFICANT CHANGE UP (ref 11.5–15.5)
IMM GRANULOCYTES NFR BLD AUTO: 0.4 % — SIGNIFICANT CHANGE UP (ref 0–1.5)
LYMPHOCYTES # BLD AUTO: 1.9 K/UL — SIGNIFICANT CHANGE UP (ref 1–3.3)
LYMPHOCYTES # BLD AUTO: 16.5 % — SIGNIFICANT CHANGE UP (ref 13–44)
MCHC RBC-ENTMCNC: 30.5 PG — SIGNIFICANT CHANGE UP (ref 27–34)
MCHC RBC-ENTMCNC: 33.5 GM/DL — SIGNIFICANT CHANGE UP (ref 32–36)
MCV RBC AUTO: 91 FL — SIGNIFICANT CHANGE UP (ref 80–100)
MONOCYTES # BLD AUTO: 1.12 K/UL — HIGH (ref 0–0.9)
MONOCYTES NFR BLD AUTO: 9.7 % — SIGNIFICANT CHANGE UP (ref 2–14)
NEUTROPHILS # BLD AUTO: 8.12 K/UL — HIGH (ref 1.8–7.4)
NEUTROPHILS NFR BLD AUTO: 70.7 % — SIGNIFICANT CHANGE UP (ref 43–77)
NRBC # BLD: 0 /100 WBCS — SIGNIFICANT CHANGE UP (ref 0–0)
PLATELET # BLD AUTO: 281 K/UL — SIGNIFICANT CHANGE UP (ref 150–400)
POTASSIUM SERPL-MCNC: 4 MMOL/L — SIGNIFICANT CHANGE UP (ref 3.5–5.3)
POTASSIUM SERPL-SCNC: 4 MMOL/L — SIGNIFICANT CHANGE UP (ref 3.5–5.3)
PROT SERPL-MCNC: 7.2 G/DL — SIGNIFICANT CHANGE UP (ref 6–8.3)
RBC # BLD: 3.9 M/UL — SIGNIFICANT CHANGE UP (ref 3.8–5.2)
RBC # FLD: 13.6 % — SIGNIFICANT CHANGE UP (ref 10.3–14.5)
SODIUM SERPL-SCNC: 138 MMOL/L — SIGNIFICANT CHANGE UP (ref 135–145)
WBC # BLD: 11.5 K/UL — HIGH (ref 3.8–10.5)
WBC # FLD AUTO: 11.5 K/UL — HIGH (ref 3.8–10.5)

## 2020-11-22 PROCEDURE — 99232 SBSQ HOSP IP/OBS MODERATE 35: CPT

## 2020-11-22 RX ADMIN — Medication 1 TABLET(S): at 17:10

## 2020-11-22 RX ADMIN — SENNA PLUS 2 TABLET(S): 8.6 TABLET ORAL at 21:05

## 2020-11-22 RX ADMIN — Medication 3 MILLIGRAM(S): at 21:05

## 2020-11-22 RX ADMIN — Medication 50 MILLIGRAM(S): at 21:05

## 2020-11-22 RX ADMIN — ESCITALOPRAM OXALATE 5 MILLIGRAM(S): 10 TABLET, FILM COATED ORAL at 11:15

## 2020-11-22 RX ADMIN — Medication 1 TABLET(S): at 05:15

## 2020-11-22 RX ADMIN — RIVAROXABAN 10 MILLIGRAM(S): KIT at 11:15

## 2020-11-22 RX ADMIN — Medication 75 MICROGRAM(S): at 05:15

## 2020-11-22 RX ADMIN — Medication 1 TABLET(S): at 11:14

## 2020-11-22 NOTE — PROGRESS NOTE ADULT - ATTENDING COMMENTS
88 y/o F PMH autoimmune hepatitis, HTN, hypothyroidism, and dementia presents w/ mechanical fall, found to have L hip fx, POD 2 L Jose Hip Arthroplasty. Plan: monitor temp curve, if spikes fever panculture and consult CASSANDRA hercules, start empiric antibx post culture if needed, procal unimpressive and leukocytosis likely reacitve, check diff and pct, monitor clinical course, otherwise clinically stbale for dc, covid for dc planning likley tomorrow or fri to rehab
The tx plan was discussed in detail with the patient and family members at the bedside. Their questions and concerns were addressed to the best of my ability. They are in agreement with the plan as detailed above. They demonstrated adequate understanding of the counseling which I have provided.      Daughter at [facility]
I personally conducted a physical examination of the patient. I personally gathered the patient's history. I edited the above listed findings which were prepared by the listed resident physician. I personally discussed the plan of care with the patient. The questions and concerns were addressed to the best of my ability. The patient is in agreement with the listed treatment plan.     - low grade temp today. post-op day 1 fever. no indication for fever workup but if fever persists will further assess.
89 year old Female, PMH Dementia (AAOx1-2 at baseline), autoimmune hepatitis, HTN, hypothyroidism, presented to the ED s/p fall, pt lost balance and fell on to her walker, denies LOC. Admitted for L femoral neck fracture, POD 4 s/p L hemiarthroplasty. Plan: pt stable for discharge, awaiting insurance authorization
pt medically stable for discharge
89 year old Female, PMH Dementia (AAOx1-2 at baseline), autoimmune hepatitis, HTN, hypothyroidism, presented to the ED s/p fall, pt lost balance and fell on to her walker, denies LOC. Admitted for L femoral neck fracture, POD 3 s/p L hemiarthroplasty.  Plan: dc planning, apprec sw/cm collaboation, apprec PT souleymane, likely AMAN tomorrow, medically stable for disxxcharge, if spikes consider panculture and empiric coverage

## 2020-11-22 NOTE — PROGRESS NOTE ADULT - SUBJECTIVE AND OBJECTIVE BOX
Patient is a 89y old  Female who presents with a chief complaint of s/p fall (21 Nov 2020 14:42)      INTERVAL HPI: Pt seen and examined. Denies any acute complaints at this time. looks forward to starting rehab.     OVERNIGHT EVENTS: none noted  T(F): 98.4 (11-22-20 @ 13:58), Max: 98.4 (11-22-20 @ 13:58)  HR: 81 (11-22-20 @ 13:58) (75 - 88)  BP: 139/67 (11-22-20 @ 13:58) (139/67 - 169/72)  RR: 17 (11-22-20 @ 13:58) (16 - 17)  SpO2: 92% (11-22-20 @ 13:58) (92% - 95%)  I&O's Summary    21 Nov 2020 07:01  -  22 Nov 2020 07:00  --------------------------------------------------------  IN: 680 mL / OUT: 1560 mL / NET: -880 mL    22 Nov 2020 07:01  -  22 Nov 2020 19:40  --------------------------------------------------------  IN: 240 mL / OUT: 500 mL / NET: -260 mL        REVIEW OF SYSTEMS:  CONSTITUTIONAL: No fever, weight loss, or fatigue  EYES: No eye pain, visual disturbances, or discharge  ENMT:  No difficulty hearing, tinnitus, vertigo; No sinus or throat pain  NECK: No pain or stiffness  BREASTS: No pain, masses, or nipple discharge  RESPIRATORY: No cough, wheezing, chills or hemoptysis; No shortness of breath  CARDIOVASCULAR: No chest pain, palpitations, dizziness, or leg swelling  GASTROINTESTINAL: No abdominal or epigastric pain. No nausea, vomiting, or hematemesis; No diarrhea or constipation. No melena or hematochezia.  GENITOURINARY: No dysuria, frequency, hematuria, or incontinence  NEUROLOGICAL: No headaches, memory loss, loss of strength, numbness, or tremors  SKIN: No itching, burning, rashes, or lesions   LYMPH NODES: No enlarged glands  ENDOCRINE: No heat or cold intolerance; No hair loss  MUSCULOSKELETAL: No joint pain or swelling; No muscle, back, or extremity pain  PSYCHIATRIC: No depression, anxiety, mood swings, or difficulty sleeping  HEME/LYMPH: No easy bruising, or bleeding gums  ALLERY AND IMMUNOLOGIC: No hives or eczema    PHYSICAL EXAM:  GENERAL: NAD, well-groomed, well-developed  HEAD:  Atraumatic, Normocephalic  EYES: EOMI, PERRLA, conjunctiva and sclera clear  ENMT: No tonsillar erythema, exudates, or enlargement; Moist mucous membranes, Good dentition, No lesions  NECK: Supple, No JVD, Normal thyroid  NERVOUS SYSTEM:  Alert & Oriented X3, Good concentration; Motor Strength 5/5 B/L upper and lower extremities; DTRs 2+ intact and symmetric  CHEST/LUNG: Clear to percussion bilaterally; No rales, rhonchi, wheezing, or rubs  HEART: Regular rate and rhythm; No murmurs, rubs, or gallops  ABDOMEN: Soft, Nontender, Nondistended; Bowel sounds present  EXTREMITIES:  2+ Peripheral Pulses, No clubbing, cyanosis, or edema  LYMPH: No lymphadenopathy noted  SKIN: No rashes or lesions    LABS:                        11.9   11.50 )-----------( 281      ( 22 Nov 2020 09:02 )             35.5     11-22    138  |  102  |  9   ----------------------------<  91  4.0   |  31  |  0.57    Ca    8.6      22 Nov 2020 09:02    TPro  7.2  /  Alb  2.3<L>  /  TBili  0.7  /  DBili  x   /  AST  33  /  ALT  34  /  AlkPhos  95  11-22        CAPILLARY BLOOD GLUCOSE                  MEDICATIONS  (STANDING):  calcium carbonate 1250 mG  + Vitamin D (OsCal 500 + D) 1 Tablet(s) Oral two times a day  escitalopram 5 milliGRAM(s) Oral daily  lactobacillus acidophilus 1 Tablet(s) Oral daily  levothyroxine 75 MICROGram(s) Oral daily  metoprolol succinate ER 50 milliGRAM(s) Oral at bedtime  polyethylene glycol 3350 17 Gram(s) Oral daily  rivaroxaban 10 milliGRAM(s) Oral daily  senna 2 Tablet(s) Oral at bedtime    MEDICATIONS  (PRN):  acetaminophen   Tablet .. 975 milliGRAM(s) Oral every 8 hours PRN Temp greater or equal to 38C (100.4F), Mild Pain (1 - 3)  melatonin 3 milliGRAM(s) Oral at bedtime PRN Insomnia  ondansetron Injectable 4 milliGRAM(s) IV Push every 6 hours PRN Nausea and/or Vomiting  oxyCODONE    IR 2.5 milliGRAM(s) Oral every 4 hours PRN Moderate Pain (4 - 6)  oxyCODONE    IR 5 milliGRAM(s) Oral every 4 hours PRN Severe Pain (7 - 10)       Patient is a 89y old  Female who presents with a chief complaint of s/p fall (21 Nov 2020 14:42)      INTERVAL HPI: Pt seen and examined. Denies any acute complaints at this time. looks forward to starting rehab.     OVERNIGHT EVENTS: none noted  T(F): 98.4 (11-22-20 @ 13:58), Max: 98.4 (11-22-20 @ 13:58)  HR: 81 (11-22-20 @ 13:58) (75 - 88)  BP: 139/67 (11-22-20 @ 13:58) (139/67 - 169/72)  RR: 17 (11-22-20 @ 13:58) (16 - 17)  SpO2: 92% (11-22-20 @ 13:58) (92% - 95%)  I&O's Summary    21 Nov 2020 07:01  -  22 Nov 2020 07:00  --------------------------------------------------------  IN: 680 mL / OUT: 1560 mL / NET: -880 mL    22 Nov 2020 07:01  -  22 Nov 2020 19:40  --------------------------------------------------------  IN: 240 mL / OUT: 500 mL / NET: -260 mL        REVIEW OF SYSTEMS: negative other than that stated in  hpi    PHYSICAL EXAM:  GENERAL: NAD, elderly, resting comfortably in bed  HEENT:  anicteric, moist mucous membranes  CHEST/LUNG:  CTA b/l, no rales, wheezes, or rhonchi  HEART:  RRR, S1, S2  ABDOMEN:  BS+, soft, nontender, nondistended  EXTREMITIES: no edema, cyanosis, +minimal b/l LE TTP with no notable erythema; LLE plantar flexion 3/5; clean and dry bandage LLE  NERVOUS SYSTEM: disoriented. Only oriented to person. confused to place and situation      LABS:                        11.9   11.50 )-----------( 281      ( 22 Nov 2020 09:02 )             35.5     11-22    138  |  102  |  9   ----------------------------<  91  4.0   |  31  |  0.57    Ca    8.6      22 Nov 2020 09:02    TPro  7.2  /  Alb  2.3<L>  /  TBili  0.7  /  DBili  x   /  AST  33  /  ALT  34  /  AlkPhos  95  11-22        CAPILLARY BLOOD GLUCOSE                  MEDICATIONS  (STANDING):  calcium carbonate 1250 mG  + Vitamin D (OsCal 500 + D) 1 Tablet(s) Oral two times a day  escitalopram 5 milliGRAM(s) Oral daily  lactobacillus acidophilus 1 Tablet(s) Oral daily  levothyroxine 75 MICROGram(s) Oral daily  metoprolol succinate ER 50 milliGRAM(s) Oral at bedtime  polyethylene glycol 3350 17 Gram(s) Oral daily  rivaroxaban 10 milliGRAM(s) Oral daily  senna 2 Tablet(s) Oral at bedtime    MEDICATIONS  (PRN):  acetaminophen   Tablet .. 975 milliGRAM(s) Oral every 8 hours PRN Temp greater or equal to 38C (100.4F), Mild Pain (1 - 3)  melatonin 3 milliGRAM(s) Oral at bedtime PRN Insomnia  ondansetron Injectable 4 milliGRAM(s) IV Push every 6 hours PRN Nausea and/or Vomiting  oxyCODONE    IR 2.5 milliGRAM(s) Oral every 4 hours PRN Moderate Pain (4 - 6)  oxyCODONE    IR 5 milliGRAM(s) Oral every 4 hours PRN Severe Pain (7 - 10)

## 2020-11-22 NOTE — PROGRESS NOTE ADULT - REASON FOR ADMISSION
s/p fall

## 2020-11-22 NOTE — PROGRESS NOTE ADULT - PROBLEM SELECTOR PLAN 3
-Likely stress induced s/p trauma.  -No ACS sx or anginal complaints.  -Cardiology consult appreciated.

## 2020-11-22 NOTE — PROGRESS NOTE ADULT - NSHPATTENDINGPLANDISCUSS_GEN_ALL_CORE
SANTANA Schwartz, 2N IDR team
pt, RN, cardio, ortho, residency team, family - re: tx plan, disposition planning, above detailed plan
pt, SW, CM, RN, residency team, family - re: tx plan, disposition planning, above detailed plan
PADMA Harris
3N IDR team
SANTANA Casillas-awaiting auth
2N IDR team

## 2020-11-22 NOTE — PROGRESS NOTE ADULT - PROBLEM SELECTOR PROBLEM 1
Closed fracture of left hip, initial encounter

## 2020-11-22 NOTE — PROGRESS NOTE ADULT - ASSESSMENT
89 year old Female, PMH Dementia (AAOx1-2 at baseline), autoimmune hepatitis, HTN, hypothyroidism, presented to the ED s/p fall, pt lost balance and fell on to her walker, denies LOC. Admitted for L femoral neck fracture, POD 6 s/p L hemiarthroplasty.

## 2020-11-22 NOTE — PROGRESS NOTE ADULT - ASSESSMENT
The patient is an 89 year old female with a history of HTN, hypothyroidism, dementia who is admitted with hip fracture.    Plan:  - Troponin elevated to 1.06 and trended down, possibly in the setting of demand ischemia from tachycardia, hypertension, and stress from fracture  - Echo with normal LV systolic function, mild MS, mod AR, mod TR, mod pulm HTN  - LE venous duplex negative for DVT  - Continue aspirin 81 mg daily  - Continue metoprolol succinate 50 mg daily  - On rivaroxaban for DVT prophylaxis  - Ortho follow-up  - Discharge planning

## 2020-11-22 NOTE — PROGRESS NOTE ADULT - PROBLEM SELECTOR PLAN 10
-Xarelto for DVT/PE ppx.  -No GI ppx indicated at this time.
-Xarelto for DVT/PE ppx.  -No GI ppx indicated at this time.    awaiting insurance auth for javi placement, medically stable for discharge
-Xarelto for DVT/PE ppx.  -No GI ppx indicated at this time.    awaiting insurance auth for javi placement, medically stable for discharge
-Xarelto for DVT/PE ppx.  -No GI ppx indicated at this time.

## 2020-11-22 NOTE — PROGRESS NOTE ADULT - PROBLEM SELECTOR PLAN 8
-Supportive care.  -Trend LFTs, currently WNL.  -GI outpatient follow up.

## 2020-11-22 NOTE — PROGRESS NOTE ADULT - PROBLEM SELECTOR PLAN 4
-Chronic, continue home Toprol with hold parameters  -BP now controlled. Continue pain regimen.  -Monitor per routine.

## 2020-11-22 NOTE — PROGRESS NOTE ADULT - PROBLEM SELECTOR PLAN 1
-POD 6 s/p L hemiarthroplasty  -Continue pain regimen (Tylenol 975mg PO for mild pain, Oxy IR 2.5mg QID for moderate pain and Oxy IR 5mg PO for severe pain), bowel regimen prn  -Xarelto 10mg daily for 35 days for post-op DVT/PE prophylaxis.  -PT eval and pt to be set up to go to [facility] pending Social Work  -Neurovascular checks as indicated.  -Ortho following pt  -Calcium and Vitamin D

## 2020-11-22 NOTE — PROGRESS NOTE ADULT - SUBJECTIVE AND OBJECTIVE BOX
Chief Complaint: Fall    Interval Events: No events overnight.    Review of Systems:  General: No fevers, chills, weight loss or gain  Skin: No rashes, color changes  Cardiovascular: No chest pain, orthopnea  Respiratory: No shortness of breath, cough  Gastrointestinal: No nausea, abdominal pain  Genitourinary: No incontinence, pain with urination  Musculoskeletal: No pain, swelling, decreased range of motion  Neurological: No headache, weakness  Psychiatric: No depression, anxiety  Endocrine: No weight loss or gain, increased thirst  All other systems are comprehensively negative.    Physical Exam:  Vital Signs Last 24 Hrs  T(C): 36.8 (22 Nov 2020 04:35), Max: 37.1 (21 Nov 2020 13:02)  T(F): 98.2 (22 Nov 2020 04:35), Max: 98.8 (21 Nov 2020 13:02)  HR: 75 (22 Nov 2020 04:35) (75 - 88)  BP: 155/69 (22 Nov 2020 04:35) (135/60 - 169/72)  BP(mean): --  RR: 16 (22 Nov 2020 04:35) (16 - 18)  SpO2: 95% (22 Nov 2020 04:35) (93% - 95%)  General: NAD  HEENT: MMM  Neck: No JVD, no carotid bruit  Lungs: CTAB  CV: RRR, nl S1/S2, no M/R/G  Abdomen: S/NT/ND, +BS  Extremities: No LE edema, no cyanosis  Neuro: AAOx3, non-focal  Skin: No rash    Labs:             11-21    139  |  102  |  17  ----------------------------<  98  4.3   |  32<H>  |  0.62    Ca    8.8      21 Nov 2020 07:54                          10.5   12.67 )-----------( 231      ( 21 Nov 2020 07:54 )             30.4

## 2020-11-23 ENCOUNTER — TRANSCRIPTION ENCOUNTER (OUTPATIENT)
Age: 85
End: 2020-11-23

## 2020-11-23 VITALS
TEMPERATURE: 98 F | HEART RATE: 83 BPM | DIASTOLIC BLOOD PRESSURE: 67 MMHG | OXYGEN SATURATION: 92 % | SYSTOLIC BLOOD PRESSURE: 108 MMHG | RESPIRATION RATE: 17 BRPM

## 2020-11-23 LAB
ANION GAP SERPL CALC-SCNC: 6 MMOL/L — SIGNIFICANT CHANGE UP (ref 5–17)
BUN SERPL-MCNC: 8 MG/DL — SIGNIFICANT CHANGE UP (ref 7–23)
CALCIUM SERPL-MCNC: 8.5 MG/DL — SIGNIFICANT CHANGE UP (ref 8.5–10.1)
CHLORIDE SERPL-SCNC: 101 MMOL/L — SIGNIFICANT CHANGE UP (ref 96–108)
CO2 SERPL-SCNC: 28 MMOL/L — SIGNIFICANT CHANGE UP (ref 22–31)
CREAT SERPL-MCNC: 0.5 MG/DL — SIGNIFICANT CHANGE UP (ref 0.5–1.3)
GLUCOSE SERPL-MCNC: 92 MG/DL — SIGNIFICANT CHANGE UP (ref 70–99)
HCT VFR BLD CALC: 33.4 % — LOW (ref 34.5–45)
HGB BLD-MCNC: 11.9 G/DL — SIGNIFICANT CHANGE UP (ref 11.5–15.5)
MCHC RBC-ENTMCNC: 31.3 PG — SIGNIFICANT CHANGE UP (ref 27–34)
MCHC RBC-ENTMCNC: 35.6 GM/DL — SIGNIFICANT CHANGE UP (ref 32–36)
MCV RBC AUTO: 87.9 FL — SIGNIFICANT CHANGE UP (ref 80–100)
NRBC # BLD: 0 /100 WBCS — SIGNIFICANT CHANGE UP (ref 0–0)
PLATELET # BLD AUTO: 295 K/UL — SIGNIFICANT CHANGE UP (ref 150–400)
POTASSIUM SERPL-MCNC: 4 MMOL/L — SIGNIFICANT CHANGE UP (ref 3.5–5.3)
POTASSIUM SERPL-SCNC: 4 MMOL/L — SIGNIFICANT CHANGE UP (ref 3.5–5.3)
RBC # BLD: 3.8 M/UL — SIGNIFICANT CHANGE UP (ref 3.8–5.2)
RBC # FLD: 13.4 % — SIGNIFICANT CHANGE UP (ref 10.3–14.5)
SARS-COV-2 RNA SPEC QL NAA+PROBE: SIGNIFICANT CHANGE UP
SODIUM SERPL-SCNC: 135 MMOL/L — SIGNIFICANT CHANGE UP (ref 135–145)
WBC # BLD: 10.95 K/UL — HIGH (ref 3.8–10.5)
WBC # FLD AUTO: 10.95 K/UL — HIGH (ref 3.8–10.5)

## 2020-11-23 PROCEDURE — 86901 BLOOD TYPING SEROLOGIC RH(D): CPT

## 2020-11-23 PROCEDURE — 71045 X-RAY EXAM CHEST 1 VIEW: CPT

## 2020-11-23 PROCEDURE — 84145 PROCALCITONIN (PCT): CPT

## 2020-11-23 PROCEDURE — 97162 PT EVAL MOD COMPLEX 30 MIN: CPT

## 2020-11-23 PROCEDURE — 80053 COMPREHEN METABOLIC PANEL: CPT

## 2020-11-23 PROCEDURE — 99239 HOSP IP/OBS DSCHRG MGMT >30: CPT

## 2020-11-23 PROCEDURE — 93005 ELECTROCARDIOGRAM TRACING: CPT

## 2020-11-23 PROCEDURE — U0003: CPT

## 2020-11-23 PROCEDURE — 70486 CT MAXILLOFACIAL W/O DYE: CPT

## 2020-11-23 PROCEDURE — 84436 ASSAY OF TOTAL THYROXINE: CPT

## 2020-11-23 PROCEDURE — 88305 TISSUE EXAM BY PATHOLOGIST: CPT

## 2020-11-23 PROCEDURE — 36415 COLL VENOUS BLD VENIPUNCTURE: CPT

## 2020-11-23 PROCEDURE — 84443 ASSAY THYROID STIM HORMONE: CPT

## 2020-11-23 PROCEDURE — 99285 EMERGENCY DEPT VISIT HI MDM: CPT

## 2020-11-23 PROCEDURE — 85025 COMPLETE CBC W/AUTO DIFF WBC: CPT

## 2020-11-23 PROCEDURE — 86769 SARS-COV-2 COVID-19 ANTIBODY: CPT

## 2020-11-23 PROCEDURE — 97110 THERAPEUTIC EXERCISES: CPT

## 2020-11-23 PROCEDURE — 97535 SELF CARE MNGMENT TRAINING: CPT

## 2020-11-23 PROCEDURE — 80048 BASIC METABOLIC PNL TOTAL CA: CPT

## 2020-11-23 PROCEDURE — 85027 COMPLETE CBC AUTOMATED: CPT

## 2020-11-23 PROCEDURE — 73552 X-RAY EXAM OF FEMUR 2/>: CPT

## 2020-11-23 PROCEDURE — 97165 OT EVAL LOW COMPLEX 30 MIN: CPT

## 2020-11-23 PROCEDURE — 72192 CT PELVIS W/O DYE: CPT

## 2020-11-23 PROCEDURE — 83880 ASSAY OF NATRIURETIC PEPTIDE: CPT

## 2020-11-23 PROCEDURE — 86850 RBC ANTIBODY SCREEN: CPT

## 2020-11-23 PROCEDURE — 97530 THERAPEUTIC ACTIVITIES: CPT

## 2020-11-23 PROCEDURE — C1776: CPT

## 2020-11-23 PROCEDURE — 72125 CT NECK SPINE W/O DYE: CPT

## 2020-11-23 PROCEDURE — 70450 CT HEAD/BRAIN W/O DYE: CPT

## 2020-11-23 PROCEDURE — 87635 SARS-COV-2 COVID-19 AMP PRB: CPT

## 2020-11-23 PROCEDURE — 88311 DECALCIFY TISSUE: CPT

## 2020-11-23 PROCEDURE — 97116 GAIT TRAINING THERAPY: CPT

## 2020-11-23 PROCEDURE — 97112 NEUROMUSCULAR REEDUCATION: CPT

## 2020-11-23 PROCEDURE — 85610 PROTHROMBIN TIME: CPT

## 2020-11-23 PROCEDURE — 85730 THROMBOPLASTIN TIME PARTIAL: CPT

## 2020-11-23 PROCEDURE — 84484 ASSAY OF TROPONIN QUANT: CPT

## 2020-11-23 PROCEDURE — 73502 X-RAY EXAM HIP UNI 2-3 VIEWS: CPT

## 2020-11-23 PROCEDURE — 84480 ASSAY TRIIODOTHYRONINE (T3): CPT

## 2020-11-23 PROCEDURE — 93306 TTE W/DOPPLER COMPLETE: CPT

## 2020-11-23 PROCEDURE — 83735 ASSAY OF MAGNESIUM: CPT

## 2020-11-23 PROCEDURE — 86900 BLOOD TYPING SEROLOGIC ABO: CPT

## 2020-11-23 PROCEDURE — 82553 CREATINE MB FRACTION: CPT

## 2020-11-23 PROCEDURE — C1713: CPT

## 2020-11-23 PROCEDURE — 93970 EXTREMITY STUDY: CPT

## 2020-11-23 PROCEDURE — 82550 ASSAY OF CK (CPK): CPT

## 2020-11-23 RX ADMIN — Medication 1 TABLET(S): at 13:18

## 2020-11-23 RX ADMIN — POLYETHYLENE GLYCOL 3350 17 GRAM(S): 17 POWDER, FOR SOLUTION ORAL at 13:18

## 2020-11-23 RX ADMIN — Medication 1 TABLET(S): at 05:12

## 2020-11-23 RX ADMIN — ESCITALOPRAM OXALATE 5 MILLIGRAM(S): 10 TABLET, FILM COATED ORAL at 13:18

## 2020-11-23 RX ADMIN — RIVAROXABAN 10 MILLIGRAM(S): KIT at 13:18

## 2020-11-23 RX ADMIN — Medication 75 MICROGRAM(S): at 05:12

## 2020-11-23 NOTE — DISCHARGE NOTE NURSING/CASE MANAGEMENT/SOCIAL WORK - PATIENT PORTAL LINK FT
You can access the FollowMyHealth Patient Portal offered by [facility] by registering at the following website: [email or web address]. By joining [employer]s FollowMyHealth portal, you will also be able to view your health information using other applications (apps) compatible with our system.

## 2020-11-23 NOTE — PROGRESS NOTE ADULT - SUBJECTIVE AND OBJECTIVE BOX
Chief Complaint: Fall    Interval Events: No significant changes.    Review of Systems:  General: No fevers, chills, weight loss or gain  Skin: No rashes, color changes  Cardiovascular: No chest pain, orthopnea  Respiratory: No shortness of breath, cough  Gastrointestinal: No nausea, abdominal pain  Genitourinary: No incontinence, pain with urination  Musculoskeletal: No pain, swelling, decreased range of motion  Neurological: No headache, weakness  Psychiatric: No depression, anxiety  Endocrine: No weight loss or gain, increased thirst  All other systems are comprehensively negative.    Physical Exam:  Vital Signs Last 24 Hrs  T(C): 36.3 (23 Nov 2020 05:28), Max: 37.1 (22 Nov 2020 20:37)  T(F): 97.4 (23 Nov 2020 05:28), Max: 98.8 (22 Nov 2020 20:37)  HR: 77 (23 Nov 2020 05:28) (77 - 95)  BP: 156/84 (23 Nov 2020 05:28) (139/67 - 160/79)  BP(mean): --  RR: 17 (23 Nov 2020 05:28) (17 - 17)  SpO2: 95% (23 Nov 2020 05:28) (92% - 95%)  General: NAD  HEENT: MMM  Neck: No JVD, no carotid bruit  Lungs: CTAB  CV: RRR, nl S1/S2, no M/R/G  Abdomen: S/NT/ND, +BS  Extremities: No LE edema, no cyanosis  Neuro: AAOx3, non-focal  Skin: No rash    Labs:             11-22    138  |  102  |  9   ----------------------------<  91  4.0   |  31  |  0.57    Ca    8.6      22 Nov 2020 09:02    TPro  7.2  /  Alb  2.3<L>  /  TBili  0.7  /  DBili  x   /  AST  33  /  ALT  34  /  AlkPhos  95  11-22                        11.9   11.50 )-----------( 281      ( 22 Nov 2020 09:02 )             35.5

## 2021-01-01 NOTE — ED ADULT NURSE NOTE - INTEGUMENTARY WDL
[address]    Patient:  Baby Girl Shimon Crockett PCP:  Cheral Halsted   MRN:  [id number] Hospital Provider:  Billy Stein Physician   Infant Name after D/C:  Rosaline Srinivasan Date of Note:  2021     YOB: 2021  3:51 PM  Birth Wt: Birth Weight: 6 lb 2.6 oz (2.795 kg) Most Recent Wt:  Weight - Scale: 5 lb 14.2 oz (2.67 kg) Percent loss since birth weight:  -4%    Information for the patient's mother:  Blayne Raman [[id number]]   39w1d       Birth Length:  Length: 19\" (48.3 cm) (Filed from Delivery Summary)  Birth Head Circumference:  Birth Head Circumference: 33 cm (12.99\")    Last Serum Bilirubin: No results found for: BILITOT  Last Transcutaneous Bilirubin:   Time Taken: 3556 (21 0552)    Transcutaneous Bilirubin Result: 7     Screening and Immunization:   Hearing Screen:                                                  [place] Metabolic Screen:    PKU Form #: [id number] (21 1615)   Congenital Heart Screen 1:  Date: 21  Time: 1640  Pulse Ox Saturation of Right Hand: 100 %  Pulse Ox Saturation of Foot: 100 %  Difference (Right Hand-Foot): 0 %  Screening  Result: Pass  Congenital Heart Screen 2:  NA     Congenital Heart Screen 3: NA     Immunizations:   Immunization History   Administered Date(s) Administered    Hepatitis B Ped/Adol (Engerix-B, Recombivax HB) 2021         Maternal Data:    Information for the patient's mother:  Blayne Raman [[id number]]   19 y.o. Information for the patient's mother:  Blayne Raman [[id number]]   39w1d       /Para:   Information for the patient's mother:  Blayne Raman [[id number]]   [id number]        Prenatal History & Labs:   Information for the patient's mother:  Blayne Raman [[id number]]     Lab Results   Component Value Date    [address] AB POS 2021    ABOEXTERN AB 2021    RHEXTERN positive 2021    LABANTI NEG 2021    HBSAGI Non-reactive 2020    HEPBEXTERN negative 2021    RUBELABIGG 59.6 04/08/2020    RUBEXTERN immune 2021    RPREXTERN non reactive 2021      HIV:   Information for the patient's mother:  Tereso Nicolas [[id number]]     Lab Results   Component Value Date    HIVEXTERN non reactive 2021    HIVAG/AB Non-Reactive 04/08/2020      COVID-19:   Information for the patient's mother:  Tereso Nicolas [[id number]]     Lab Results   Component Value Date    COVID19 Not Detected 09/08/2020      Admission RPR:   Information for the patient's mother:  Tereso Nicolas [[id number]]     Lab Results   Component Value Date    RPREXTERN non reactive 2021    [address] Non-Reactive 2021       Hepatitis C:   Information for the patient's mother:  Tereso Nicolas [[id number]]     Lab Results   Component Value Date    HEPCABCIAIND <0.02 04/08/2020    HEPCABCIAINT Negative 04/08/2020      GBS status:    Information for the patient's mother:  Tereso Nicolas [[id number]]     Lab Results   Component Value Date    GBSEXTERN negative 2021    GBSCX No Group B Beta Strep isolated 08/26/2020             GBS treatment:  none  GC and Chlamydia:   Information for the patient's mother:  Tereso Nicolas [[id number]]     Lab Results   Component Value Date    GONEXTERN negative 2021    CTRACHEXT negative 2021      Maternal Toxicology:     Information for the patient's mother:  Tereso Nicolas [[id number]]     Lab Results   Component Value Date    [facility] BEHAVIORAL HEALTH POSITIVE 2021    [facility] BE[facility]AVIORAL HEALTH Neg 09/08/2020    [facility] 08/26/2020    BARBSCNU Neg 2021    BARBSCNU Neg 09/08/2020    BARBSCNU Neg 08/26/2020    LABBENZ Neg 2021    LABBENZ Neg 09/08/2020    LABBENZ Neg 08/26/2020    CANSU Neg 2021    CANSU Neg 09/08/2020    CANSU Neg 08/26/2020    BUPRENUR POSITIVE 2021    BUPRENUR POSITIVE 09/08/2020    BUPRENUR POSITIVE 08/26/2020    COCAIMETSCRU Neg 2021    COCAIMETSCRU Neg 09/08/2020    [place] Neg 2020    OPIATESCREENURINE Neg 2021    OPIATESCREENURINE Neg 2020    OPIATESCREENURINE Neg 2020    PHENCYCLIDINESCREENURINE Neg 2021    PHENCYCLIDINESCREENURINE Neg 2020    PHENCYCLIDINESCREENURINE Neg 2020    LABMETH Neg 2021    PROPOX Neg 2021    PROPOX Neg 2020    PROPOX Neg 2020      Information for the patient's mother:  Aaliyah Huizar [[id number]]     Lab Results   Component Value Date    OXYCODONEUR Neg 2021    OXYCODONEUR Neg 2020    OXYCODONEUR Neg 2020      Information for the patient's mother:  Aaliyah Huizar [[id number]]     Past Medical History:   Diagnosis Date    Drug abuse ([facility] [address]     hx methamphetamine use (none since pregnant)-in CRC program on subutex    HGSIL (high grade squamous intraepithelial lesion) on Pap smear of cervix 2020      Other significant maternal history:  None. Maternal ultrasounds:  Normal per mother.  Information:  Information for the patient's mother:  Aaliyah Huizar [[id number]]        : 2021  3:51 PM   (ROM x 6 hours PTD)       Delivery Method: Vaginal, Spontaneous  Rupture date:  2021  Rupture time:  9:40 AM    Additional  Information:  Complications:  None   Information for the patient's mother:  Aaliyah Huizar [[id number]]         Reason for  section (if applicable):NA    Apgars:   APGAR One: 9;  APGAR Five: 9;  APGAR Ten: N/A  Resuscitation: Bulb Suction [20]; Stimulation [25]    Objective:   Reviewed pregnancy & family history as well as nursing notes & vitals. Physical Exam:    Pulse 128   Temp 98.2 °F (36.8 °C)   Resp 40   Ht 19\" (48.3 cm) Comment: Filed from Delivery Summary  Wt 5 lb 14.2 oz (2.67 kg)   HC 33 cm (12.99\") Comment: Filed from Delivery Summary  BMI 11.46 kg/m²     Constitutional: VSS. Alert and appropriate to exam.   No distress. Consolable   Head: Fontanelles are open, soft and flat. No facial anomaly noted.  No significant molding present. Ears:  External ears normal.   Nose: Nostrils without airway obstruction. Nose appears visually straight   Mouth/Throat:  Mucous membranes are moist. No cleft palate palpated. Eyes: Red reflex is present bilaterally on admission exam.   Cardiovascular: Normal rate, regular rhythm, S1 & S2 normal.  Distal  pulses are palpable. No murmur noted. Pulmonary/Chest: Effort normal.  Breath sounds equal and normal. No respiratory distress - no nasal flaring, stridor, grunting or retraction. No chest deformity noted. Abdominal: Soft. Bowel sounds are normal. No tenderness. No distension, mass or organomegaly. Umbilicus appears grossly normal     Genitourinary: Normal female external genitalia. Musculoskeletal: Normal ROM. Neg- [address]. Clavicles & spine intact. Neurological: .slightly increased tone normal for gestation. Suck & root normal. Symmetric and full Lydia. Symmetric grasp & movement. Skin:  Skin is warm & dry. Capillary refill less than 3 seconds. No cyanosis or pallor. No visible jaundice.      Recent Labs:   Recent Results (from the past 120 hour(s))   Drug Screen Multi Urine With Bup    Collection Time: 21  6:20 PM   Result Value Ref Range    Amphetamine Screen, Urine Neg Negative <1000ng/mL    Barbiturate Screen, Ur Neg Negative <200 ng/mL    Benzodiazepine Screen, Urine Neg Negative <200 ng/mL    Cannabinoid Scrn, Ur Neg Negative <50 ng/mL    Cocaine Metabolite Screen, Urine Neg Negative <300 ng/mL    Opiate Scrn, Ur Neg Negative <300 ng/mL    PCP Screen, Urine Neg Negative <25 ng/mL    Methadone Screen, Urine Neg Negative <300 ng/mL    Propoxyphene Scrn, Ur Neg Negative <300 ng/mL    Oxycodone Urine Neg Negative <100 ng/ml    Buprenorphine Urine POSITIVE (A) Negative <5 ng/ml    pH, UA 5.0     Drug Screen Comment: see below    Bilirubin transcutaneous    Collection Time: 21 12:00 AM   Result Value Ref Range    Trans Bilirubin,  POC 7     QC reviewed by:       [place] Medications   Vitamin K and Erythromycin Opthalmic Ointment given at delivery. Assessment:     Patient Active Problem List   Diagnosis Code    Single liveborn infant delivered vaginally Z38.00       Feeding Method: Feeding Method Used: Bottle  Urine output:  6  Stool output:  2  Percent weight change from birth:  -4%    Maternal labs pending: none  Plan:     Questions answered. Routine  care. Maternal subutex use, Infant urine tox +Buprenorphine- will do CORINE scoring every 3 to 4 hours. Scores have been 1-2. Currently consolable and eating well. Will monitor for 72 to 94 hours.      Edyta Sterling MD Color consistent with ethnicity/race, warm, dry intact, resilient.

## 2021-01-04 RX ORDER — NITROFURANTOIN MACROCRYSTAL 50 MG
1 CAPSULE ORAL
Qty: 0 | Refills: 0 | DISCHARGE
Start: 2021-01-04

## 2021-01-05 ENCOUNTER — INPATIENT (INPATIENT)
Facility: HOSPITAL | Age: 86
LOS: 0 days | Discharge: OTHER INSTITUTION | DRG: 689 | End: 2021-01-06
Attending: GENERAL PRACTICE | Admitting: GENERAL PRACTICE
Payer: MEDICARE

## 2021-01-05 VITALS
HEIGHT: 65 IN | TEMPERATURE: 100 F | HEART RATE: 94 BPM | DIASTOLIC BLOOD PRESSURE: 79 MMHG | SYSTOLIC BLOOD PRESSURE: 178 MMHG | RESPIRATION RATE: 18 BRPM | OXYGEN SATURATION: 99 % | WEIGHT: 130.07 LBS

## 2021-01-05 DIAGNOSIS — R41.82 ALTERED MENTAL STATUS, UNSPECIFIED: ICD-10-CM

## 2021-01-05 DIAGNOSIS — Z98.89 OTHER SPECIFIED POSTPROCEDURAL STATES: Chronic | ICD-10-CM

## 2021-01-05 DIAGNOSIS — R53.1 WEAKNESS: ICD-10-CM

## 2021-01-05 DIAGNOSIS — R41.0 DISORIENTATION, UNSPECIFIED: ICD-10-CM

## 2021-01-05 DIAGNOSIS — N39.0 URINARY TRACT INFECTION, SITE NOT SPECIFIED: ICD-10-CM

## 2021-01-05 DIAGNOSIS — E03.9 HYPOTHYROIDISM, UNSPECIFIED: ICD-10-CM

## 2021-01-05 DIAGNOSIS — L89.91 PRESSURE ULCER OF UNSPECIFIED SITE, STAGE 1: ICD-10-CM

## 2021-01-05 DIAGNOSIS — F32.9 MAJOR DEPRESSIVE DISORDER, SINGLE EPISODE, UNSPECIFIED: ICD-10-CM

## 2021-01-05 DIAGNOSIS — I10 ESSENTIAL (PRIMARY) HYPERTENSION: ICD-10-CM

## 2021-01-05 DIAGNOSIS — K75.4 AUTOIMMUNE HEPATITIS: ICD-10-CM

## 2021-01-05 DIAGNOSIS — Z90.710 ACQUIRED ABSENCE OF BOTH CERVIX AND UTERUS: Chronic | ICD-10-CM

## 2021-01-05 DIAGNOSIS — Z29.9 ENCOUNTER FOR PROPHYLACTIC MEASURES, UNSPECIFIED: ICD-10-CM

## 2021-01-05 DIAGNOSIS — R50.9 FEVER, UNSPECIFIED: ICD-10-CM

## 2021-01-05 DIAGNOSIS — Z90.49 ACQUIRED ABSENCE OF OTHER SPECIFIED PARTS OF DIGESTIVE TRACT: Chronic | ICD-10-CM

## 2021-01-05 LAB
ALBUMIN SERPL ELPH-MCNC: 2.7 G/DL — LOW (ref 3.3–5)
ALP SERPL-CCNC: 69 U/L — SIGNIFICANT CHANGE UP (ref 40–120)
ALT FLD-CCNC: 13 U/L — SIGNIFICANT CHANGE UP (ref 12–78)
ANION GAP SERPL CALC-SCNC: 7 MMOL/L — SIGNIFICANT CHANGE UP (ref 5–17)
APPEARANCE UR: CLEAR — SIGNIFICANT CHANGE UP
APTT BLD: 27.4 SEC — LOW (ref 27.5–35.5)
AST SERPL-CCNC: 24 U/L — SIGNIFICANT CHANGE UP (ref 15–37)
BASOPHILS # BLD AUTO: 0.07 K/UL — SIGNIFICANT CHANGE UP (ref 0–0.2)
BASOPHILS NFR BLD AUTO: 0.8 % — SIGNIFICANT CHANGE UP (ref 0–2)
BILIRUB SERPL-MCNC: 0.6 MG/DL — SIGNIFICANT CHANGE UP (ref 0.2–1.2)
BILIRUB UR-MCNC: NEGATIVE — SIGNIFICANT CHANGE UP
BUN SERPL-MCNC: 9 MG/DL — SIGNIFICANT CHANGE UP (ref 7–23)
CALCIUM SERPL-MCNC: 8.5 MG/DL — SIGNIFICANT CHANGE UP (ref 8.5–10.1)
CHLORIDE SERPL-SCNC: 101 MMOL/L — SIGNIFICANT CHANGE UP (ref 96–108)
CO2 SERPL-SCNC: 27 MMOL/L — SIGNIFICANT CHANGE UP (ref 22–31)
COLOR SPEC: YELLOW — SIGNIFICANT CHANGE UP
CREAT SERPL-MCNC: 0.69 MG/DL — SIGNIFICANT CHANGE UP (ref 0.5–1.3)
DIFF PNL FLD: ABNORMAL
EOSINOPHIL # BLD AUTO: 0.03 K/UL — SIGNIFICANT CHANGE UP (ref 0–0.5)
EOSINOPHIL NFR BLD AUTO: 0.3 % — SIGNIFICANT CHANGE UP (ref 0–6)
GLUCOSE SERPL-MCNC: 120 MG/DL — HIGH (ref 70–99)
GLUCOSE UR QL: NEGATIVE — SIGNIFICANT CHANGE UP
HCT VFR BLD CALC: 36.1 % — SIGNIFICANT CHANGE UP (ref 34.5–45)
HGB BLD-MCNC: 12 G/DL — SIGNIFICANT CHANGE UP (ref 11.5–15.5)
IMM GRANULOCYTES NFR BLD AUTO: 0.3 % — SIGNIFICANT CHANGE UP (ref 0–1.5)
INR BLD: 1.14 RATIO — SIGNIFICANT CHANGE UP (ref 0.88–1.16)
KETONES UR-MCNC: NEGATIVE — SIGNIFICANT CHANGE UP
LACTATE SERPL-SCNC: 1.4 MMOL/L — SIGNIFICANT CHANGE UP (ref 0.7–2)
LEUKOCYTE ESTERASE UR-ACNC: NEGATIVE — SIGNIFICANT CHANGE UP
LYMPHOCYTES # BLD AUTO: 0.85 K/UL — LOW (ref 1–3.3)
LYMPHOCYTES # BLD AUTO: 9.7 % — LOW (ref 13–44)
MCHC RBC-ENTMCNC: 29.6 PG — SIGNIFICANT CHANGE UP (ref 27–34)
MCHC RBC-ENTMCNC: 33.2 GM/DL — SIGNIFICANT CHANGE UP (ref 32–36)
MCV RBC AUTO: 88.9 FL — SIGNIFICANT CHANGE UP (ref 80–100)
MONOCYTES # BLD AUTO: 1.01 K/UL — HIGH (ref 0–0.9)
MONOCYTES NFR BLD AUTO: 11.5 % — SIGNIFICANT CHANGE UP (ref 2–14)
NEUTROPHILS # BLD AUTO: 6.76 K/UL — SIGNIFICANT CHANGE UP (ref 1.8–7.4)
NEUTROPHILS NFR BLD AUTO: 77.4 % — HIGH (ref 43–77)
NITRITE UR-MCNC: NEGATIVE — SIGNIFICANT CHANGE UP
NRBC # BLD: 0 /100 WBCS — SIGNIFICANT CHANGE UP (ref 0–0)
PH UR: 8 — SIGNIFICANT CHANGE UP (ref 5–8)
PLATELET # BLD AUTO: 304 K/UL — SIGNIFICANT CHANGE UP (ref 150–400)
POTASSIUM SERPL-MCNC: 3.8 MMOL/L — SIGNIFICANT CHANGE UP (ref 3.5–5.3)
POTASSIUM SERPL-SCNC: 3.8 MMOL/L — SIGNIFICANT CHANGE UP (ref 3.5–5.3)
PROCALCITONIN SERPL-MCNC: <0.05 — SIGNIFICANT CHANGE UP (ref 0–0.04)
PROT SERPL-MCNC: 8 G/DL — SIGNIFICANT CHANGE UP (ref 6–8.3)
PROT UR-MCNC: 15
PROTHROM AB SERPL-ACNC: 13.3 SEC — SIGNIFICANT CHANGE UP (ref 10.6–13.6)
RBC # BLD: 4.06 M/UL — SIGNIFICANT CHANGE UP (ref 3.8–5.2)
RBC # FLD: 13.8 % — SIGNIFICANT CHANGE UP (ref 10.3–14.5)
SARS-COV-2 RNA SPEC QL NAA+PROBE: SIGNIFICANT CHANGE UP
SODIUM SERPL-SCNC: 135 MMOL/L — SIGNIFICANT CHANGE UP (ref 135–145)
SP GR SPEC: 1.01 — SIGNIFICANT CHANGE UP (ref 1.01–1.02)
UROBILINOGEN FLD QL: NEGATIVE — SIGNIFICANT CHANGE UP
WBC # BLD: 8.75 K/UL — SIGNIFICANT CHANGE UP (ref 3.8–10.5)
WBC # FLD AUTO: 8.75 K/UL — SIGNIFICANT CHANGE UP (ref 3.8–10.5)

## 2021-01-05 PROCEDURE — 71045 X-RAY EXAM CHEST 1 VIEW: CPT | Mod: 26

## 2021-01-05 PROCEDURE — 99497 ADVNCD CARE PLAN 30 MIN: CPT | Mod: 25

## 2021-01-05 PROCEDURE — 70450 CT HEAD/BRAIN W/O DYE: CPT | Mod: 26

## 2021-01-05 PROCEDURE — 99285 EMERGENCY DEPT VISIT HI MDM: CPT

## 2021-01-05 PROCEDURE — 99222 1ST HOSP IP/OBS MODERATE 55: CPT

## 2021-01-05 RX ORDER — LEVOTHYROXINE SODIUM 125 MCG
37.5 TABLET ORAL AT BEDTIME
Refills: 0 | Status: DISCONTINUED | OUTPATIENT
Start: 2021-01-05 | End: 2021-01-06

## 2021-01-05 RX ORDER — METOPROLOL TARTRATE 50 MG
50 TABLET ORAL AT BEDTIME
Refills: 0 | Status: DISCONTINUED | OUTPATIENT
Start: 2021-01-06 | End: 2021-01-06

## 2021-01-05 RX ORDER — AZTREONAM 2 G
VIAL (EA) INJECTION
Refills: 0 | Status: DISCONTINUED | OUTPATIENT
Start: 2021-01-05 | End: 2021-01-06

## 2021-01-05 RX ORDER — AZTREONAM 2 G
1000 VIAL (EA) INJECTION ONCE
Refills: 0 | Status: COMPLETED | OUTPATIENT
Start: 2021-01-05 | End: 2021-01-05

## 2021-01-05 RX ORDER — AZTREONAM 2 G
1000 VIAL (EA) INJECTION EVERY 8 HOURS
Refills: 0 | Status: DISCONTINUED | OUTPATIENT
Start: 2021-01-06 | End: 2021-01-06

## 2021-01-05 RX ORDER — ACETAMINOPHEN 500 MG
650 TABLET ORAL ONCE
Refills: 0 | Status: COMPLETED | OUTPATIENT
Start: 2021-01-05 | End: 2021-01-05

## 2021-01-05 RX ORDER — ACETAMINOPHEN 500 MG
650 TABLET ORAL EVERY 6 HOURS
Refills: 0 | Status: DISCONTINUED | OUTPATIENT
Start: 2021-01-05 | End: 2021-01-06

## 2021-01-05 RX ORDER — SODIUM CHLORIDE 9 MG/ML
1000 INJECTION INTRAMUSCULAR; INTRAVENOUS; SUBCUTANEOUS
Refills: 0 | Status: DISCONTINUED | OUTPATIENT
Start: 2021-01-05 | End: 2021-01-06

## 2021-01-05 RX ORDER — ENOXAPARIN SODIUM 100 MG/ML
40 INJECTION SUBCUTANEOUS DAILY
Refills: 0 | Status: DISCONTINUED | OUTPATIENT
Start: 2021-01-05 | End: 2021-01-06

## 2021-01-05 RX ORDER — METOPROLOL TARTRATE 50 MG
5 TABLET ORAL EVERY 6 HOURS
Refills: 0 | Status: DISCONTINUED | OUTPATIENT
Start: 2021-01-05 | End: 2021-01-06

## 2021-01-05 RX ORDER — SODIUM CHLORIDE 9 MG/ML
1850 INJECTION INTRAMUSCULAR; INTRAVENOUS; SUBCUTANEOUS ONCE
Refills: 0 | Status: COMPLETED | OUTPATIENT
Start: 2021-01-05 | End: 2021-01-05

## 2021-01-05 RX ADMIN — Medication 50 MILLIGRAM(S): at 21:33

## 2021-01-05 RX ADMIN — Medication 37.5 MICROGRAM(S): at 22:16

## 2021-01-05 RX ADMIN — Medication 650 MILLIGRAM(S): at 15:53

## 2021-01-05 RX ADMIN — SODIUM CHLORIDE 1850 MILLILITER(S): 9 INJECTION INTRAMUSCULAR; INTRAVENOUS; SUBCUTANEOUS at 15:53

## 2021-01-05 RX ADMIN — SODIUM CHLORIDE 75 MILLILITER(S): 9 INJECTION INTRAMUSCULAR; INTRAVENOUS; SUBCUTANEOUS at 22:15

## 2021-01-05 NOTE — H&P ADULT - NSHPPHYSICALEXAM_GEN_ALL_CORE
T(C): 37.7 (01-05-21 @ 12:31), Max: 37.7 (01-05-21 @ 12:31)  HR: 77 (01-05-21 @ 18:10) (77 - 94)  BP: 122/64 (01-05-21 @ 18:10) (122/64 - 178/79)  RR: 18 (01-05-21 @ 18:10) (18 - 18)  SpO2: 95% (01-05-21 @ 18:10) (95% - 99%)    GENERAL: patient appears well, no acute distress, appropriate, pleasant  EYES: sclera clear, no exudates  ENMT: oropharynx clear without erythema, no exudates, moist mucous membranes  NECK: supple, soft, no thyromegaly noted  LUNGS: good air entry bilaterally, clear to auscultation, symmetric breath sounds, no wheezing or rhonchi appreciated  HEART: soft S1/S2, regular rate and rhythm, no murmurs noted, no lower extremity edema  GASTROINTESTINAL: abdomen is soft, nontender, nondistended, normoactive bowel sounds, no palpable masses  INTEGUMENT: good skin turgor, no lesions noted  MUSCULOSKELETAL: no clubbing or cyanosis, no obvious deformity  NEUROLOGIC: awake, alert, oriented x3, good muscle tone in 4 extremities, no obvious sensory deficits  PSYCHIATRIC: mood is good, affect is congruent, linear and logical thought process  HEME/LYMPH: no palpable supraclavicular nodules, no obvious ecchymosis or petechiae

## 2021-01-05 NOTE — H&P ADULT - PROBLEM SELECTOR PLAN 1
- Patient's daughter reports fevers at home  - Admit to ___  - No fevers documented since presentation the ED, s/p Tylenol suppository 650mg in the ED  - No leukocytosis present, monitor daily CBC  - UA unimpressive, no obvious consolidation on CXR  - COVID PCR (-) on admission but cannot definitively rule out  - F/u ??RVP and repeat COVID PCR on day 4 of admission  - F/u blood and urine cultures  - F/u ESR/CRP  - ID  __ consulted, f/u recommendations - Patient being managed as outpatient for E. coli UTI by PCP Dr. Godinez  - Admit to [facility]  - S/p Tylenol suppository 650mg in the ED, continue q6h PRN for fevers  - No leukocytosis present, monitor daily CBC  - UA unimpressive, likely due to outpatient Macrobid  - No obvious consolidation on CXR  - COVID PCR (-) on admission  - F/u RVP and repeat COVID PCR on day 4 of admission  - F/u blood and urine cultures  - Start Azactam 1g (due to PCN allergy) q8h  - Takes probiotic at home, can restart once able to tolerate PO  - ID/PCP Dr. Godinez consulted, f/u recommendations

## 2021-01-05 NOTE — H&P ADULT - PROBLEM SELECTOR PLAN 2
- Patient with known dementia at baseline  - CT head demonstrates extensive chronic ischemic changes, no acute infarct or hemorrhage  - Aspiration precautions  - ?Informal bedside swallow eval by RN, f/u formal speech and swallow  - Keep patient NPO for now ??except meds and start maintenance IVF @ 60cc/hr x18hrs  - H/o hypothyrdoidism, takes Synthroid 75mcg at home, continue with IV formulation and f/u TFTs  - Neuro Dr. Weber consulted in the ED, f/u recommended MRI - Patient with known mild dementia at baseline, now worsening likely due to UTI  - CT head demonstrates extensive chronic ischemic changes, no acute infarct or hemorrhage  - Aspiration precautions  - Informal bedside swallow eval by RN, f/u formal speech and swallow  - Keep patient NPO for now including meds and start maintenance IVF @ 75cc/hr x18hrs  - H/o hypothyroidism, takes Synthroid 75mcg at home, continue with IV formulation and f/u TFTs  - Neuro Dr. Weber consulted in the ED, f/u recommended MRI - Patient with known mild dementia at baseline, now worsening likely due to UTI  - CT head demonstrates extensive chronic ischemic changes, no acute infarct or hemorrhage  - Aspiration precautions  - Informal bedside swallow eval by RN, f/u formal speech and swallow  - Keep patient NPO for now including meds and start maintenance IVF @ 75cc/hr x18hrs  - H/o hypothyroidism, takes Synthroid 75mcg at home, continue with IV formulation and f/u TFTs  - Neuro Dr. Weber consulted in the ED, f/u recommended MRI to r/o CVA  - Patient takes ASA 81mg at home, restart once able to tolerate PO - Patient with known mild dementia at baseline, now worsening likely due to acute metabolic encephalopathy in setting of UTI  - CT head demonstrates extensive chronic ischemic changes, no acute infarct or hemorrhage  - Aspiration precautions  - Informal bedside swallow eval by RN, f/u formal speech and swallow  - Keep patient NPO for now including meds and start maintenance IVF @ 75cc/hr x18hrs  - H/o hypothyroidism, takes Synthroid 75mcg at home, continue with IV formulation and f/u TFTs  - Neuro Dr. Weber consulted in the ED, f/u recommended MRI to r/o CVA  - Patient takes ASA 81mg at home, restart once able to tolerate PO

## 2021-01-05 NOTE — H&P ADULT - PROBLEM SELECTOR PLAN 9
- Pharm VTE ppx with Lovenox 40mg daily    IMPROVE VTE Individual Risk Assessment          RISK                                                          Points  [  ] Previous VTE                                                3  [  ] Thrombophilia                                             2  [  ] Lower limb paralysis                                   2        (unable to hold up >15 seconds)    [  ] Current Cancer                                             2         (within 6 months)  [  ] Immobilization > 24 hrs                              1  [  ] ICU/CCU stay > 24 hours                             1  [ x ] Age > 60                                                         1    IMPROVE VTE Score: 1

## 2021-01-05 NOTE — H&P ADULT - ATTENDING COMMENTS
Patient seen at bedside in the ED. She is confused, aware of name. not sure of year or location.   History was obtained from daughter.   Recently discharged from [facility] on 12/22 after L manas hip arthroplasty on 11/16/20 s/p fall. Patient was on Xarelto for DVT prophylaxis, now off. She has been minimally mobile since [facility].   Diagnosed with E coli UTI and was started on macrobid yesterday. only received one dose. She was sent in for increased confusion and weakness.   Tmax 100.4 in the ED.     Patient laying comfortably in bed.   EOMI, PERRLA, head ATNC  mmdry, no facial droop   CTAB, no w/r/r  abd soft, NT/ND  no suprapubic tenderness.   healed L hip incision. no erythema or drainage. + stage one sacral ulcer.   no pedal edema  5/5 bilateral lower extremity strength. No focal weakness. follows commands at bedside. no slurring of speech    A/P:  Weakness/low grade temp likely secondary to UTI    - admit to [facility]    - recent UCx EColi, will start azactam given unknown PCN allergy     - repeat UCx, monitor temps     - PT eval     - neuro consulted, plan for MRI brain in AM.    - COVID neg, CXR neg, low suspicion for COVID. will repeat swab in 4 days per protocol    - patient is confused and fatigued at bedside. pending bedside swallow eval. will switch meds to IV for now. reassess in AM    - PT eval     HTN    - reordered lopressor for AM if passed SLP    - IV lopressor prn SBP > 160    Hypothyroid    - cont home synthroid, IV 35 units daily     DVT proph: lovenox 40 units SC daily

## 2021-01-05 NOTE — H&P ADULT - PROBLEM SELECTOR PLAN 3
- Patient c/o weakness and inability to ambulate  - Discharged to [facility] s/p L hip manas arthroplasty in 11/2020  - F/u TFTs  - Neuro Dr. Weber consulted, f/u recommendations  - PT consulted, f/u recommendations

## 2021-01-05 NOTE — ED PROVIDER NOTE - OBJECTIVE STATEMENT
89 y/o F with hx of baseline dementia with increased confusion associated with fevers at home.  Pt poor historian denies any complaints.  pt with recent admission 2 mos ago for femur fx s/p fall for which she went o rehab for.  Pt denies chest pain, sob, abd pain.    PCP:

## 2021-01-05 NOTE — H&P ADULT - NSHPREVIEWOFSYSTEMS_GEN_ALL_CORE
CONSTITUTIONAL: denies fever, chills, fatigue, weakness  HEENT: denies blurred vision, sore throat  SKIN: denies new lesions, rash  CARDIOVASCULAR: denies chest pain, chest pressure, palpitations  RESPIRATORY: denies shortness of breath, sputum production  GASTROINTESTINAL: denies nausea, vomiting, diarrhea, abdominal pain  GENITOURINARY: denies dysuria, discharge  NEUROLOGICAL: denies numbness, headache, focal weakness  MUSCULOSKELETAL: denies new joint pain, muscle aches  HEMATOLOGIC: denies gross bleeding, bruising  LYMPHATICS: denies enlarged lymph nodes, extremity swelling  PSYCHIATRIC: denies recent changes in anxiety, depression  ENDOCRINOLOGIC: denies sweating, cold or heat intolerance CONSTITUTIONAL: admits fever; denies chills, fatigue, weakness  HEENT: denies blurred vision, sore throat  SKIN: denies new lesions, rash  CARDIOVASCULAR: denies chest pain, chest pressure, palpitations  RESPIRATORY: admits dry cough; denies shortness of breath, sputum production  GASTROINTESTINAL: denies nausea, vomiting, diarrhea, abdominal pain  GENITOURINARY: admits malodorous and dark urine; denies dysuria, discharge  NEUROLOGICAL: denies numbness, headache, focal weakness  MUSCULOSKELETAL: admits LLE discomfort  HEMATOLOGIC: denies gross bleeding, bruising  LYMPHATICS: denies enlarged lymph nodes, extremity swelling

## 2021-01-05 NOTE — ED ADULT NURSE NOTE - NURSING ED PRESSURE ULCER LOCATION 2
1st- Patient Instructions:  Patient will need to log into their [employer] account and choose 'Change Primary Prescriber' from the menu. The patient will need to enter the following information: first and last name of their new doctor, doctor's location, and the doctor's phone number.     2nd- Provider Instructions:  1. Log in to [facility]   2. Click on accept patient  3. Type in their [employer] number       
gluteal

## 2021-01-05 NOTE — H&P ADULT - ASSESSMENT
88yo F, with PMH/o baseline dementia, HTN, hypothyroidism, and autoimmune hepatitis, BIBEMS from home for fever, increased AMS, weakness, inability to ambulate, admitted for fever of unknown origin and AMS. 88yo F, with PMH/o baseline dementia, HTN, hypothyroidism, and autoimmune hepatitis, BIBEMS from home for fever, increased AMS, weakness, inability to ambulate, admitted for AMS and fever likely 2/2 UTI.

## 2021-01-05 NOTE — H&P ADULT - PROBLEM SELECTOR PLAN 7
- Pharm VTE ppx with Lovenox 40mg daily    IMPROVE VTE Individual Risk Assessment          RISK                                                          Points  [  ] Previous VTE                                                3  [  ] Thrombophilia                                             2  [  ] Lower limb paralysis                                   2        (unable to hold up >15 seconds)    [  ] Current Cancer                                             2         (within 6 months)  [  ] Immobilization > 24 hrs                              1  [  ] ICU/CCU stay > 24 hours                             1  [ x ] Age > 60                                                         1    IMPROVE VTE Score: 1 - Chronic  - Takes Lexapro 25mg qd at home, continue while inpatient when able to tolerate PO - Chronic  - Not currently receiving treatment  - Monitor daily LFTs

## 2021-01-05 NOTE — H&P ADULT - PROBLEM SELECTOR PLAN 4
- Chronic, initially elevated upon ED presentation but resolved  - Takes __ at home  - Continue with ___ with hold parameters  - Monitor routine hemodynamics - Chronic, initially elevated upon ED presentation but resolved  - Takes metoprolol succ 50mg qhs at home  - Continue with IV Lopessor 5mg PRN for SBP >160 to allow for permissive HTN in setting of possible stroke  - Will order PO home metoprolol succ to start tomorrow when patient hopefully has passed bedside nursing swallow eval  - Monitor routine hemodynamics

## 2021-01-05 NOTE — H&P ADULT - PROBLEM SELECTOR PLAN 8
- Pharm VTE ppx with Lovenox 40mg daily    IMPROVE VTE Individual Risk Assessment          RISK                                                          Points  [  ] Previous VTE                                                3  [  ] Thrombophilia                                             2  [  ] Lower limb paralysis                                   2        (unable to hold up >15 seconds)    [  ] Current Cancer                                             2         (within 6 months)  [  ] Immobilization > 24 hrs                              1  [  ] ICU/CCU stay > 24 hours                             1  [ x ] Age > 60                                                         1    IMPROVE VTE Score: 1 - Chronic  - Takes Lexapro 25mg qd at home, continue while inpatient when able to tolerate PO

## 2021-01-05 NOTE — ED ADULT NURSE NOTE - OBJECTIVE STATEMENT
Pt presents to the ED s/p AMS, fever. Pt is a/o to self, doesn't know her birthday. Pt is confused. Pt presents to the ED s/p AMS, fever. Pt is a/o to self, doesn't know her birthday. Pt is confused. T& P q 2 hrs. Pt moves all of her extremities, can follow simple directions.

## 2021-01-05 NOTE — H&P ADULT - PROBLEM SELECTOR PLAN 6
- Chronic - Chronic  - Not currently receiving treatment  - Monitor daily LFTs - Daughter notes redness of bilateral buttocks as patient is mostly sedentary  - Patient also typically wears diaper ast home  - Q2h repositioning to avoid further skin breakdown

## 2021-01-05 NOTE — H&P ADULT - NSHPSOCIALHISTORY_GEN_ALL_CORE
Lives at home with daughter  Requires assistance with all ADLs  Ambulates minimally with walker  Denies tobacco, EtOH, or drug use  Received both flu and PNA vaccines

## 2021-01-05 NOTE — H&P ADULT - HISTORY OF PRESENT ILLNESS
89yo F, with PMH/o baseline dementia, HTN, hypothyroidism, and autoimmune hepatitis, BIBEMS from home for fever, increased AMS, weakness, inability to ambulate per daughter. Patient is unable to provide further history at this time. Tmax 101 at home. Of note, patient was most recently admitted to [facility] in 11/2020 with L hip fracture s/p fall. She is now s/p L manas hip arthroplasty on 11/16/20 after which patient was started on Xarelto for total of 35 days.    In the ED  VS: T 99.9 HR 77 /79-->122/64 RR 18 SpO2 95% on RA  No significant labs. COVID PCR (-).  UA with negative nitrites/LE, moderate blood, and occasional bacteria.  Ct head: extensive chronic ischemic changes, no acute infarct or hemorrhage  CXR: Mild generalized nonspecific bronchovascular markings unchanged from prior imaging, likely chronic. No consolidation, possible trace L pleural effusion.  EKG: NSR, possible LAE, possible age-undetermined anterior and inferior infarcts    Patient received 1850cc NS and Tylenol supp 650mg. 89yo F, with PMH/o baseline mild dementia per daughter, HTN, hypothyroidism, congenital unilateral kidney, and autoimmune hepatitis, BIBEMS from home for fever, increased AMS, weakness, inability to ambulate per daughter. Patient is unable to provide further history at this time. History provided by daughter, Amina. Daughter noted patient had malodorous and dark urine since returning home from [place] [facility] at [place] on 12/22/20. This prompted a televisit with PCP Dr. Godinez who performed UA and Ucx which found urinary WBC and E. coli. Patient typically wears a diaper but daughter collected urine sample from clean commode at home. Patient was started on Macrobid 100mg BID, only received 1 dose last night and was not able to take 2nd dose this AM due to confusion. Daughter was concerned that patient was having a stroked and called EMS. Upon EMS arrival, patient was found to be febrile with Tmax 100.4. Daughter did note that patient also had elevated temperatures in the week prior, ~100deg. Patient did not receive antipyretic preiot to ED presentation. Of note, patient was most recently admitted to [facility] in 11/2020 with L hip fracture s/p fall. She is now s/p L manas hip arthroplasty on 11/16/20 after which patient was started on Xarelto for total of 35 days. Daughter endorses new dry cough x1d, decreased appetite, residual discomfort of LLE since ortho surgery, and stage 1 buttock ulcer as patient is primarily sedentary. Last BM yesterday. Daughter denies mother complaining of headache, chest pain, SOB, abd pain, N/V/D/C, or skin changes. Daughter denies known sick contacts and travel.    In the ED  VS: T 99.9 HR 77 /79-->122/64 RR 18 SpO2 95% on RA  No significant labs. COVID PCR (-).  UA with negative nitrites/LE, moderate blood, and occasional bacteria.  Ct head: extensive chronic ischemic changes, no acute infarct or hemorrhage  CXR: Mild generalized nonspecific bronchovascular markings unchanged from prior imaging, likely chronic. No consolidation, possible trace L pleural effusion.  EKG: NSR, possible LAE, possible age-undetermined anterior and inferior infarcts    Patient received 1850cc NS and Tylenol supp 650mg.

## 2021-01-06 ENCOUNTER — TRANSCRIPTION ENCOUNTER (OUTPATIENT)
Age: 86
End: 2021-01-06

## 2021-01-06 ENCOUNTER — INPATIENT (INPATIENT)
Facility: HOSPITAL | Age: 86
LOS: 2 days | Discharge: HOME | DRG: 689 | End: 2021-01-09
Attending: INTERNAL MEDICINE | Admitting: INTERNAL MEDICINE
Payer: MEDICARE

## 2021-01-06 VITALS
TEMPERATURE: 98 F | RESPIRATION RATE: 17 BRPM | OXYGEN SATURATION: 98 % | DIASTOLIC BLOOD PRESSURE: 64 MMHG | HEART RATE: 77 BPM | SYSTOLIC BLOOD PRESSURE: 164 MMHG

## 2021-01-06 VITALS
DIASTOLIC BLOOD PRESSURE: 74 MMHG | TEMPERATURE: 99 F | OXYGEN SATURATION: 95 % | RESPIRATION RATE: 18 BRPM | SYSTOLIC BLOOD PRESSURE: 145 MMHG | HEART RATE: 84 BPM

## 2021-01-06 DIAGNOSIS — Z90.49 ACQUIRED ABSENCE OF OTHER SPECIFIED PARTS OF DIGESTIVE TRACT: Chronic | ICD-10-CM

## 2021-01-06 DIAGNOSIS — R41.0 DISORIENTATION, UNSPECIFIED: ICD-10-CM

## 2021-01-06 DIAGNOSIS — E03.9 HYPOTHYROIDISM, UNSPECIFIED: ICD-10-CM

## 2021-01-06 DIAGNOSIS — Z90.710 ACQUIRED ABSENCE OF BOTH CERVIX AND UTERUS: Chronic | ICD-10-CM

## 2021-01-06 DIAGNOSIS — I10 ESSENTIAL (PRIMARY) HYPERTENSION: ICD-10-CM

## 2021-01-06 DIAGNOSIS — N39.0 URINARY TRACT INFECTION, SITE NOT SPECIFIED: ICD-10-CM

## 2021-01-06 DIAGNOSIS — Z29.9 ENCOUNTER FOR PROPHYLACTIC MEASURES, UNSPECIFIED: ICD-10-CM

## 2021-01-06 DIAGNOSIS — Z98.89 OTHER SPECIFIED POSTPROCEDURAL STATES: Chronic | ICD-10-CM

## 2021-01-06 LAB
ALBUMIN SERPL ELPH-MCNC: 2.4 G/DL — LOW (ref 3.3–5)
ALP SERPL-CCNC: 56 U/L — SIGNIFICANT CHANGE UP (ref 40–120)
ALT FLD-CCNC: 12 U/L — SIGNIFICANT CHANGE UP (ref 12–78)
ANION GAP SERPL CALC-SCNC: 6 MMOL/L — SIGNIFICANT CHANGE UP (ref 5–17)
AST SERPL-CCNC: 21 U/L — SIGNIFICANT CHANGE UP (ref 15–37)
BASOPHILS # BLD AUTO: 0.05 K/UL — SIGNIFICANT CHANGE UP (ref 0–0.2)
BASOPHILS NFR BLD AUTO: 0.7 % — SIGNIFICANT CHANGE UP (ref 0–2)
BILIRUB SERPL-MCNC: 0.5 MG/DL — SIGNIFICANT CHANGE UP (ref 0.2–1.2)
BUN SERPL-MCNC: 8 MG/DL — SIGNIFICANT CHANGE UP (ref 7–23)
CALCIUM SERPL-MCNC: 8.4 MG/DL — LOW (ref 8.5–10.1)
CHLORIDE SERPL-SCNC: 105 MMOL/L — SIGNIFICANT CHANGE UP (ref 96–108)
CO2 SERPL-SCNC: 27 MMOL/L — SIGNIFICANT CHANGE UP (ref 22–31)
CREAT SERPL-MCNC: 0.54 MG/DL — SIGNIFICANT CHANGE UP (ref 0.5–1.3)
CULTURE RESULTS: SIGNIFICANT CHANGE UP
EOSINOPHIL # BLD AUTO: 0.12 K/UL — SIGNIFICANT CHANGE UP (ref 0–0.5)
EOSINOPHIL NFR BLD AUTO: 1.7 % — SIGNIFICANT CHANGE UP (ref 0–6)
FERRITIN SERPL-MCNC: 287 NG/ML — HIGH (ref 15–150)
GLUCOSE SERPL-MCNC: 108 MG/DL — HIGH (ref 70–99)
HCT VFR BLD CALC: 34.5 % — SIGNIFICANT CHANGE UP (ref 34.5–45)
HGB BLD-MCNC: 11.1 G/DL — LOW (ref 11.5–15.5)
IMM GRANULOCYTES NFR BLD AUTO: 0.3 % — SIGNIFICANT CHANGE UP (ref 0–1.5)
LDH SERPL L TO P-CCNC: 279 U/L — HIGH (ref 50–242)
LYMPHOCYTES # BLD AUTO: 0.91 K/UL — LOW (ref 1–3.3)
LYMPHOCYTES # BLD AUTO: 12.6 % — LOW (ref 13–44)
MCHC RBC-ENTMCNC: 28.8 PG — SIGNIFICANT CHANGE UP (ref 27–34)
MCHC RBC-ENTMCNC: 32.2 GM/DL — SIGNIFICANT CHANGE UP (ref 32–36)
MCV RBC AUTO: 89.6 FL — SIGNIFICANT CHANGE UP (ref 80–100)
MONOCYTES # BLD AUTO: 0.87 K/UL — SIGNIFICANT CHANGE UP (ref 0–0.9)
MONOCYTES NFR BLD AUTO: 12.1 % — SIGNIFICANT CHANGE UP (ref 2–14)
NEUTROPHILS # BLD AUTO: 5.24 K/UL — SIGNIFICANT CHANGE UP (ref 1.8–7.4)
NEUTROPHILS NFR BLD AUTO: 72.6 % — SIGNIFICANT CHANGE UP (ref 43–77)
NRBC # BLD: 0 /100 WBCS — SIGNIFICANT CHANGE UP (ref 0–0)
PLATELET # BLD AUTO: 268 K/UL — SIGNIFICANT CHANGE UP (ref 150–400)
POTASSIUM SERPL-MCNC: 4 MMOL/L — SIGNIFICANT CHANGE UP (ref 3.5–5.3)
POTASSIUM SERPL-SCNC: 4 MMOL/L — SIGNIFICANT CHANGE UP (ref 3.5–5.3)
PROT SERPL-MCNC: 7.2 G/DL — SIGNIFICANT CHANGE UP (ref 6–8.3)
RBC # BLD: 3.85 M/UL — SIGNIFICANT CHANGE UP (ref 3.8–5.2)
RBC # FLD: 14.1 % — SIGNIFICANT CHANGE UP (ref 10.3–14.5)
SARS-COV-2 IGG SERPL QL IA: NEGATIVE — SIGNIFICANT CHANGE UP
SARS-COV-2 IGM SERPL IA-ACNC: 0.06 INDEX — SIGNIFICANT CHANGE UP
SODIUM SERPL-SCNC: 138 MMOL/L — SIGNIFICANT CHANGE UP (ref 135–145)
SPECIMEN SOURCE: SIGNIFICANT CHANGE UP
T3 SERPL-MCNC: 56 NG/DL — LOW (ref 80–200)
T4 AB SER-ACNC: 6.7 UG/DL — SIGNIFICANT CHANGE UP (ref 4.6–12)
TSH SERPL-MCNC: 0.67 UIU/ML — SIGNIFICANT CHANGE UP (ref 0.36–3.74)
WBC # BLD: 7.21 K/UL — SIGNIFICANT CHANGE UP (ref 3.8–10.5)
WBC # FLD AUTO: 7.21 K/UL — SIGNIFICANT CHANGE UP (ref 3.8–10.5)

## 2021-01-06 PROCEDURE — 99223 1ST HOSP IP/OBS HIGH 75: CPT | Mod: AI

## 2021-01-06 PROCEDURE — 70551 MRI BRAIN STEM W/O DYE: CPT | Mod: 26

## 2021-01-06 PROCEDURE — 99239 HOSP IP/OBS DSCHRG MGMT >30: CPT

## 2021-01-06 PROCEDURE — 76775 US EXAM ABDO BACK WALL LIM: CPT | Mod: 26

## 2021-01-06 RX ORDER — CEFTRIAXONE 500 MG/1
1 INJECTION, POWDER, FOR SOLUTION INTRAMUSCULAR; INTRAVENOUS
Qty: 0 | Refills: 0 | DISCHARGE
Start: 2021-01-06

## 2021-01-06 RX ORDER — AZTREONAM 2 G
50 VIAL (EA) INJECTION
Qty: 0 | Refills: 0 | DISCHARGE
Start: 2021-01-06

## 2021-01-06 RX ORDER — CEFTRIAXONE 500 MG/1
1000 INJECTION, POWDER, FOR SOLUTION INTRAMUSCULAR; INTRAVENOUS EVERY 24 HOURS
Refills: 0 | Status: DISCONTINUED | OUTPATIENT
Start: 2021-01-06 | End: 2021-01-09

## 2021-01-06 RX ORDER — CEFTRIAXONE 500 MG/1
1000 INJECTION, POWDER, FOR SOLUTION INTRAMUSCULAR; INTRAVENOUS ONCE
Refills: 0 | Status: DISCONTINUED | OUTPATIENT
Start: 2021-01-06 | End: 2021-01-06

## 2021-01-06 RX ORDER — CEFTRIAXONE 500 MG/1
1000 INJECTION, POWDER, FOR SOLUTION INTRAMUSCULAR; INTRAVENOUS EVERY 24 HOURS
Refills: 0 | Status: DISCONTINUED | OUTPATIENT
Start: 2021-01-07 | End: 2021-01-06

## 2021-01-06 RX ORDER — ASPIRIN/CALCIUM CARB/MAGNESIUM 324 MG
81 TABLET ORAL DAILY
Refills: 0 | Status: DISCONTINUED | OUTPATIENT
Start: 2021-01-06 | End: 2021-01-09

## 2021-01-06 RX ORDER — ENOXAPARIN SODIUM 100 MG/ML
40 INJECTION SUBCUTANEOUS DAILY
Refills: 0 | Status: DISCONTINUED | OUTPATIENT
Start: 2021-01-06 | End: 2021-01-09

## 2021-01-06 RX ORDER — ESCITALOPRAM OXALATE 10 MG/1
1 TABLET, FILM COATED ORAL
Qty: 0 | Refills: 0 | DISCHARGE

## 2021-01-06 RX ORDER — CEFTRIAXONE 500 MG/1
INJECTION, POWDER, FOR SOLUTION INTRAMUSCULAR; INTRAVENOUS
Refills: 0 | Status: DISCONTINUED | OUTPATIENT
Start: 2021-01-06 | End: 2021-01-06

## 2021-01-06 RX ORDER — LACTOBACILLUS ACIDOPHILUS 100MM CELL
1 CAPSULE ORAL
Refills: 0 | Status: DISCONTINUED | OUTPATIENT
Start: 2021-01-06 | End: 2021-01-09

## 2021-01-06 RX ORDER — LEVOTHYROXINE SODIUM 125 MCG
75 TABLET ORAL DAILY
Refills: 0 | Status: DISCONTINUED | OUTPATIENT
Start: 2021-01-06 | End: 2021-01-09

## 2021-01-06 RX ORDER — SODIUM CHLORIDE 9 MG/ML
75 INJECTION INTRAMUSCULAR; INTRAVENOUS; SUBCUTANEOUS
Qty: 0 | Refills: 0 | DISCHARGE
Start: 2021-01-06

## 2021-01-06 RX ORDER — METOPROLOL TARTRATE 50 MG
50 TABLET ORAL DAILY
Refills: 0 | Status: DISCONTINUED | OUTPATIENT
Start: 2021-01-06 | End: 2021-01-09

## 2021-01-06 RX ORDER — ENOXAPARIN SODIUM 100 MG/ML
40 INJECTION SUBCUTANEOUS
Qty: 0 | Refills: 0 | DISCHARGE
Start: 2021-01-06

## 2021-01-06 RX ADMIN — Medication 0.5 MILLIGRAM(S): at 09:25

## 2021-01-06 RX ADMIN — CEFTRIAXONE 100 MILLIGRAM(S): 500 INJECTION, POWDER, FOR SOLUTION INTRAMUSCULAR; INTRAVENOUS at 16:49

## 2021-01-06 RX ADMIN — Medication 650 MILLIGRAM(S): at 08:54

## 2021-01-06 RX ADMIN — Medication 50 MILLIGRAM(S): at 05:34

## 2021-01-06 RX ADMIN — Medication 1 TABLET(S): at 18:29

## 2021-01-06 RX ADMIN — Medication 50 MILLIGRAM(S): at 22:41

## 2021-01-06 RX ADMIN — ENOXAPARIN SODIUM 40 MILLIGRAM(S): 100 INJECTION SUBCUTANEOUS at 18:35

## 2021-01-06 NOTE — DISCHARGE NOTE PROVIDER - NSDCMRMEDTOKEN_GEN_ALL_CORE_FT
Acidophilus oral capsule: 1 cap(s) orally once a day  aspirin 81 mg oral tablet: 1 tab(s) orally once a day  Lexapro 5 mg oral tablet: 1 tab(s) orally once a day  Macrobid 100 mg oral capsule: 1 cap(s) orally 2 times a day  metoprolol succinate 50 mg oral tablet, extended release: 1 tab(s) orally once a day  Synthroid 75 mcg (0.075 mg) oral tablet: 1 tab(s) orally once a day  Vitamin D3: 1 tab(s) orally once a day   Acidophilus oral capsule: 1 cap(s) orally once a day  aspirin 81 mg oral tablet: 1 tab(s) orally once a day  aztreonam: 50 milliliter(s) intravenously over 60 minutes every 8 hours  enoxaparin: 40 milligram(s) subcutaneous once a day  Lexapro 5 mg oral tablet: 1 tab(s) orally once a day  Macrobid 100 mg oral capsule: 1 cap(s) orally 2 times a day  metoprolol succinate 50 mg oral tablet, extended release: 1 tab(s) orally once a day  sodium chloride 0.9% injectable solution: 75 milliliter(s) by continuous intravenous infusion now  Synthroid 75 mcg (0.075 mg) oral tablet: 1 tab(s) orally once a day  Vitamin D3: 1 tab(s) orally once a day   Acidophilus oral capsule: 1 cap(s) orally once a day  aspirin 81 mg oral tablet: 1 tab(s) orally once a day  cefTRIAXone: 1 gram(s) intravenous once a day  enoxaparin: 40 milligram(s) subcutaneous once a day  metoprolol succinate 50 mg oral tablet, extended release: 1 tab(s) orally once a day  sodium chloride 0.9% injectable solution: 75 milliliter(s) by continuous intravenous infusion now  Synthroid 75 mcg (0.075 mg) oral tablet: 1 tab(s) orally once a day  Vitamin D3: 1 tab(s) orally once a day

## 2021-01-06 NOTE — SWALLOW BEDSIDE ASSESSMENT ADULT - COMMENTS
Per charting, the patient is an "88yo F, with PMH/o baseline dementia, HTN, hypothyroidism, and autoimmune hepatitis, BIBEMS from home for fever, increased AMS, weakness, inability to ambulate, admitted for AMS and fever likely 2/2 UTI."    CT HEAD 1/5: "extensive chronic ischemic changes with volume loss. No CT evidence of an acute infarct or hemorrhage."    XR CHEST 1/5: "No active infiltrates. Possible trace left pleural effusion."    Consult received and chart reviewed. Patient seen at bedside this morning for an assessment of swallow function, at which time she was awake/alert and oriented to self and place. The patient was able to follow simple directives and appropriately answer yes/no questions presented verbally by clinician.

## 2021-01-06 NOTE — H&P ADULT - PROBLEM SELECTOR PLAN 2
will try to obtain cx from Dr Godinez's office.   CASSANDRA vaz at [facility] noted - start on Rocephin and monitor patient for further fever.  Will be seen here tomorrow.

## 2021-01-06 NOTE — DISCHARGE NOTE PROVIDER - CARE PROVIDER_API CALL
Tiburcio Godinez)  Infectious Disease; Internal Medicine  [address] [place] [address] [id number]  Phone: [phone]  Fax: [phone]  Follow Up Time:

## 2021-01-06 NOTE — H&P ADULT - NSHPPHYSICALEXAM_GEN_ALL_CORE
PHYSICAL EXAM:  Vital Signs Last 24 Hrs  T(C): 36.9 (06 Jan 2021 13:30), Max: 38 (06 Jan 2021 08:53)  T(F): 98.5 (06 Jan 2021 13:30), Max: 100.4 (06 Jan 2021 08:53)  HR: 77 (06 Jan 2021 15:40) (69 - 94)  BP: 164/64 (06 Jan 2021 13:30) (122/64 - 168/66)  BP(mean): --  RR: 17 (06 Jan 2021 13:30) (17 - 18)  SpO2: 98% (06 Jan 2021 15:40) (93% - 98%)    GENERAL: NAD, well-groomed, well-developed  HEAD:  Atraumatic, Normocephalic  EYES: conjunctiva and sclera clear  ENMT:  Moist mucous membranes  NECK: Supple, No JVD  NERVOUS SYSTEM:  Awake and alert, moving all extremities  CHEST/LUNG: Clear to auscultation bilaterally;   HEART: Regular rate and rhythm;   ABDOMEN: Soft, Nontender, Nondistended; Bowel sounds present  EXTREMITIES:  2+ Peripheral Pulses, No clubbing, cyanosis, or edema  SKIN:  stage 1 sacrum  healing left hip incision

## 2021-01-06 NOTE — CONSULT NOTE ADULT - SUBJECTIVE AND OBJECTIVE BOX
[facility], Division of Infectious Diseases  BARBARA Vaughan, DWIGHT See  [phone]    JOESPH MAYBERRY  89y, Female  [id number]    HPI--  HPI:  89yo F, with PMH/o baseline mild dementia per daughter, HTN, hypothyroidism, congenital unilateral kidney, and autoimmune hepatitis, BIBEMS from home for fever, increased AMS, weakness, inability to ambulate per daughter. Patient is unable to provide further history at this time. History provided by daughter, Amina. Daughter noted patient had malodorous and dark urine since returning home from [place] [facility] at [place] on 20. This prompted a televisit with PCP Dr. Godinez who performed UA and Ucx which found urinary WBC and E. coli. Patient typically wears a diaper but daughter collected urine sample from clean commode at home. Patient was started on Macrobid 100mg BID, only received 1 dose last night and was not able to take 2nd dose this AM due to confusion. Daughter was concerned that patient was having a stroked and called EMS. Upon EMS arrival, patient was found to be febrile with Tmax 100.4. Daughter did note that patient also had elevated temperatures in the week prior, ~100deg. Patient did not receive antipyretic preiot to ED presentation. Of note, patient was most recently admitted to [facility] in 2020 with L hip fracture s/p fall. She is now s/p L manas hip arthroplasty on 20 after which patient was started on Xarelto for total of 35 days. Daughter endorses new dry cough x1d, decreased appetite, residual discomfort of LLE since ortho surgery, and stage 1 buttock ulcer as patient is primarily sedentary. Last BM yesterday. Daughter denies mother complaining of headache, chest pain, SOB, abd pain, N/V/D/C, or skin changes. Daughter denies known sick contacts and travel.    In the ED  VS: T 99.9 HR 77 /79-->122/64 RR 18 SpO2 95% on RA  No significant labs. COVID PCR (-).  UA with negative nitrites/LE, moderate blood, and occasional bacteria.  Ct head: extensive chronic ischemic changes, no acute infarct or hemorrhage  CXR: Mild generalized nonspecific bronchovascular markings unchanged from prior imaging, likely chronic. No consolidation, possible trace L pleural effusion.  EKG: NSR, possible LAE, possible age-undetermined anterior and inferior infarcts    Patient received 1850cc NS and Tylenol supp 650mg.    Pt seen and examined at bedside in the ED.   Unable to provide much hx as noted above.   Pt did take one dose macrobid.       Chart reviewed. No previous cx to go off from.   Penicillin allergy noted in chart; no cephalosporin allergy documented.        Active Medications--  acetaminophen  Suppository .. 650 milliGRAM(s) Rectal every 6 hours PRN  aztreonam  IVPB 1000 milliGRAM(s) IV Intermittent every 8 hours  aztreonam  IVPB      enoxaparin Injectable 40 milliGRAM(s) SubCutaneous daily  levothyroxine Injectable 37.5 MICROGram(s) IV Push at bedtime  metoprolol succinate ER 50 milliGRAM(s) Oral at bedtime  metoprolol tartrate Injectable 5 milliGRAM(s) IV Push every 6 hours PRN  sodium chloride 0.9%. 1000 milliLiter(s) IV Continuous <Continuous>    Antimicrobials:   aztreonam  IVPB 1000 milliGRAM(s) IV Intermittent every 8 hours  aztreonam  IVPB        Immunologic:     ROS:  CONSTITUTIONAL: No fevers or chills. No weakness or headache. No weight changes.  EYES/ENT: No visual or hearing changes. No sore throat or throat pain .  NECK: No pain or stiffness  RESPIRATORY: No cough, wheezing, or hemoptysis. No shortness of breath  CARDIOVASCULAR: No chest pain or palpitations  GASTROINTESTINAL: No abdominal pain. No nausea or vomiting. No diarrhea or constipation.  GENITOURINARY: No dysuria, frequency or hematuria  NEUROLOGICAL: No numbness or weakness  SKIN: No itching or rashes  PSYCHIATRIC: Pleasant. Appropriate affect    Allergies: latex (Rash)  penicillin (Rash)  sulfa drugs (Rash)    PMH -- Autoimmune disease    Hepatitis    Hypothyroid    Essential hypertension      PSH -- H/O abdominal hysterectomy    History of cholecystectomy    S/P appy      FH -- Family history of stroke    Family history of heart disease      Social History --  EtOH: denies   Tobacco: denies   Drug Use: denies     Travel/Environmental/Occupational History:    Physical Exam--  Vital Signs Last 24 Hrs  T(F): 99 (2021 11:00), Max: 100.9 (2021 13:15)  HR: 84 (2021 11:00) (69 - 94)  BP: 145/74 (2021 11:00) (122/64 - 178/79)  RR: 18 (2021 11:00) (17 - 18)  SpO2: 95% (2021 11:00) (93% - 99%)  General: nontoxic-appearing, no acute distress  HEENT: NC/AT, EOMI, anicteric, conjunctiva pink and moist, oropharynx clear, dentition fair  Neck: Not rigid. No sense of mass. No LAD  Lungs: Clear bilaterally without rales, wheezing or rhonchi  Heart: Regular rate and rhythm. No murmur, rub or gallop.  Abdomen: Soft. Nondistended. Nontender. Bowel sounds present. No organomegaly.  Back: No spinal tenderness. No costovertebral angle tenderness.  Extremities: No cyanosis or clubbing. No edema.   Skin: Warm. Dry. Good turgor. No rash. No vasculitic stigmata.      Laboratory & Imaging Data--  CBC:                       11.1   7.21  )-----------( 268      ( 2021 07:24 )             34.5     CMP:     138  |  105  |  8   ----------------------------<  108<H>  4.0   |  27  |  0.54    Ca    8.4<L>      2021 07:24    TPro  7.2  /  Alb  2.4<L>  /  TBili  0.5  /  DBili  x   /  AST  21  /  ALT  12  /  AlkPhos  56  -06    LIVER FUNCTIONS - ( 2021 07:24 )  Alb: 2.4 g/dL / Pro: 7.2 g/dL / ALK PHOS: 56 U/L / ALT: 12 U/L / AST: 21 U/L / GGT: x           Urinalysis Basic - ( 2021 14:36 )    Color: Yellow / Appearance: Clear / S.015 / pH: x  Gluc: x / Ketone: Negative  / Bili: Negative / Urobili: Negative   Blood: x / Protein: 15 / Nitrite: Negative   Leuk Esterase: Negative / RBC: 3-5 /HPF / WBC 0-2   Sq Epi: x / Non Sq Epi: Occasional / Bacteria: Occasional        Microbiology: reviewed      Radiology: reviewed  < from: MR Head No Cont (21 @ 09:47) >    EXAM:  MR BRAIN                            PROCEDURE DATE:  2021          INTERPRETATION:  MRI brain without contrast    History altered mental status, CVA    Comparison CT performed the prior day    There is moderately advanced chronic microvascular ischemic change in the periventricular white matter with associated predominantly central atrophy. There is no mass effect, cortical edema or hydrocephalus. There is no evidence of acute infarct or previous parenchymal hemorrhage. The orbitaland sellar contents and cerebellar tonsils are within normal limits.    IMPRESSION:  No acute findings            URBANO CLARK MD; Attending Radiologist  This document has been electronically signed. 2021  9:51AM    < end of copied text >  < from: CT Head No Cont (21 @ 13:22) >    EXAM:  CT BRAIN                            PROCEDURE DATE:  2021          INTERPRETATION:  INDICATION:  Altered mental status  TECHNIQUE:  A non contrast 2.5mm axial CT study of the brain was performed from skull base to vertex. Coronal and sagittal reformations were generated from the axial data.  COMPARISON EXAMINATION:  CT 2020    FINDINGS:    HEMISPHERES:  Advanced small vessel ischemic changes are diffusely noted throughout the basal ganglia, white matter, and thalami of both hemispheres. Similar findings were noted on prior CT. There is no CT evidence of an acute territorial infarct or hemorrhage.  VENTRICLES:  Midline with ex vacuo enlargement  POSTERIOR FOSSA:  Scattered mild chronic ischemic changes are noted.  EXTRACEREBRAL SPACES:  No subdural or epidural collections are noted.  SKULL BASE AND CALVARIUM:  Appears intact.  No fracture or destructive lesion is identified.  SINUSES AND MASTOIDS:  Clear.  MISCELLANEOUS:  No orbital or suprasellar abnormality noted.    IMPRESSION:    1)  extensive chronic ischemic changes with volume loss. No CT evidence of an acute infarct or hemorrhage.  2)  follow-up MR imaging may be considered for further assessment.              RADHA DOTY MD; Attending Radiologist  This document has been electronically signed. 2021  1:40PM    < end of copied text >  < from: Xray Chest 1 View- PORTABLE-Urgent (21 @ 13:01) >    EXAM:  XR CHEST PORTABLE URGENT 1V                            PROCEDURE DATE:  2021          INTERPRETATION:  Fever.    AP chest. Prior 2020.    Rotated to left. No change heart mediastinum. Mild generalized nonspecific accentuation bronchovascular markings similar to prior likely chronic. No focal consolidation. Possible trace left pleural effusion.    Impression: No active infiltrates. Possible trace left pleural effusion.            GUY OROZCO MD; Attending Radiologist  Thisdocument has been electronically signed. 2021  1:08PM    < end of copied text >

## 2021-01-06 NOTE — CONSULT NOTE ADULT - ASSESSMENT
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   Was recently dx w/ UTI as outpatient, unable to take medications.   Recent admission for L hip fracture s/p L manas hip arthroplasty on 11/16/20.     Acute cystitis  Fever  -UA mildly positive--likely in the setting of already being started on Abx  -Pending UCx  -Pending BCx x2  -obtain renal sono to evalaute for upper urinary tract disease  No previous UCx to guide therapy  ***THIS IS NOT A COMPLETE NOTE***    Lethargy/AMS  lethargy/AMS likely 2/2 acute infection  CVA r/o per imaging as above    Penicillin Allergy  -rash to penicillin noted, however low cross reactivity w/ cephalosporins  -will review medication allergies w/ daughter     Pt is a 89W w/ PMHx of mild dementia per daughter, HTN, hypothyroidism, congenital unilateral kidney, and autoimmune hepatitis, BIBEMS  for fever, AMS and weakness.   Was recently dx w/ UTI as outpatient, unable to take medications.   Recent admission for L hip fracture s/p L manas hip arthroplasty on 11/16/20.     Acute cystitis  Fever  -UA mildly positive--likely in the setting of already being started on Abx  -Pending UCx  -Pending BCx x2  -obtain renal sono to evalaute for upper urinary tract disease  No previous UCx to guide therapy  -d/c aztreonam  -will switch to ceftriaxone 1gm q24h  Will determine duration of therapy pending above    Lethargy/AMS  -lethargy/AMS likely 2/2 acute infection  -acute CVA r/o per imaging as above  -maintain aspiration precautions    Penicillin Allergy  -rash to penicillin noted, however low cross reactivity w/ cephalosporins

## 2021-01-06 NOTE — H&P ADULT - PROBLEM SELECTOR PLAN 1
poss TIA,  CVA work up started at [facility].  Seen by Neuro, Swallow eval, started on Aspirin.  PT eval ordered.  will monitor on tele x 24 hours and follow up with Neuro.

## 2021-01-06 NOTE — H&P ADULT - HISTORY OF PRESENT ILLNESS
89F with PMH baseline mild dementia, HTN, hypothyroidism, congenital single kidney, and autoimmune hepatitis, Daughter called 911 on 1/5/21 and brought to [facility] from home for fever, increased AMS, weakness, inability to ambulate per daughter. Patient is unable to provide further history. Daughter noted patient had malodorous and dark urine since returning home from [place] [facility] at [place] on 12/22/20. This prompted a televisit with PCP Dr. Godinez who performed UA and Ucx which was dropped off at the lab on 12/31 found urinary WBC and E. coli.   Patient was started on Macrobid 100mg BID, only received 1 dose 1/4  and was not able to take 2nd dose AM 1/5 due to increase in confusion and inability to walk. Daughter was concerned that patient was having a stroked and called EMS. Upon EMS arrival, patient was found to be febrile with Tmax 100.4.   Of note, patient was most recently admitted to [facility] in 11/2020 with L hip fracture s/p fall. She is now s/p Left manas hip arthroplasty on 11/16/20 after which patient was started on Xarelto for total of 35 days. She was discharged to [facility] on 11/23/20.  [facility] [place] patient was evaluated by neurology - CT extensive chronic ischemic changes with volume loss. No CT evidence of an acute infarct or hemorrhage.  MRI brain did not show infarct.    Speech and swallow [facility]  Also seen by ID - UA and Urine cx may be negative due to partially treated UTI.  Recommended Rocephin 1gm IV daily and monitor for now.   Currently patient comfortable in bed, denies pain but is unable to provide any history.

## 2021-01-06 NOTE — SWALLOW BEDSIDE ASSESSMENT ADULT - SWALLOW EVAL: DIAGNOSIS
1. The patient demonstrated functional oral management of puree, honey thick liquid, nectar thick liquid and thin liquid textures marked by adequate bolus collection, transfer and posterior transport. 2. Mild oral dysphagia for mechanical soft and regular solids marked by prolonged manipulation/increased mastication time resulting in delayed bolus collection, transfer and posterior transport. Lingual residue noted subsequent to deglutition with regular solids which cleared with multiple liquids washes (X2). 3. Pharyngeal skills characterized by initiation of swallow trigger with + hyolaryngeal elevation upon digital palpation without evidence of airway penetration. 4. Recommend dysphagia 2 mechanical soft with thin liquids + aspiration precautions.

## 2021-01-06 NOTE — H&P ADULT - NSHPLABSRESULTS_GEN_ALL_CORE
Labs:                        11.1   7.21  )-----------( 268      ( 2021 07:24 )             34.5       138  |  105  |  8   ----------------------------<  108<H>  4.0   |  27  |  0.54    Ca    8.4<L>      2021 07:24    TPro  7.2  /  Alb  2.4<L>  /  TBili  0.5  /  DBili  x   /  AST  21  /  ALT  12  /  AlkPhos  56          Urinalysis Basic - ( 2021 14:36 )  Color: Yellow / Appearance: Clear / S.015 / pH: x  Gluc: x / Ketone: Negative  / Bili: Negative / Urobili: Negative   Blood: x / Protein: 15 / Nitrite: Negative   Leuk Esterase: Negative / RBC: 3-5 /HPF / WBC 0-2   Sq Epi: x / Non Sq Epi: Occasional / Bacteria: Occasional    PT/INR - ( 2021 14:36 )   PT: 13.3 sec;   INR: 1.14 ratio    PTT - ( 2021 14:36 )  PTT:27.4 sec    Lactate Trend   @ 14:36 Lactate:1.4     CAPILLARY BLOOD GLUCOSE    EKG: Normal sinus rhythm  Possible Left atrial enlargement  Possible Inferior infarct , age undetermined  Possible Anterior infarct , age undetermined  Abnormal ECG    Personally Reviewed:  [ ] YES     Imaging:   < from: CT Head No Cont (21 @ 13:22) >  IMPRESSION:  1)  extensive chronic ischemic changes with volume loss. No CT evidence of an acute infarct or hemorrhage.  2)  follow-up MR imaging may be considered for further assessment.  < end of copied text >    < from: MR Head No Cont (21 @ 09:47) >  There is moderately advanced chronic microvascular ischemic change in the periventricular white matter with associated predominantly central atrophy. There is no mass effect, cortical edema or hydrocephalus. There is no evidence of acute infarct or previous parenchymal hemorrhage. The orbital and sellar contents and cerebellar tonsils are within normal limits.    IMPRESSION:  No acute findings  < end of copied text >    < from: Xray Chest 1 View- PORTABLE-Urgent (21 @ 13:01) >  Impression: No active infiltrates. Possible trace left pleural effusion.  < end of copied text >      Personally Reviewed:  [ x] YES

## 2021-01-06 NOTE — DISCHARGE NOTE PROVIDER - HOSPITAL COURSE
FROM ADMISSION H+P:   HPI:  88yo F, with PMH/o baseline mild dementia per daughter, HTN, hypothyroidism, congenital unilateral kidney, and autoimmune hepatitis, BIBEMS from home for fever, increased AMS, weakness, inability to ambulate per daughter. Patient is unable to provide further history at this time. History provided by daughter, Amina. Daughter noted patient had malodorous and dark urine since [facility] [place] [facility]. This prompted a televisit with PCP Dr. Godinez who performed UA and Ucx which found urinary WBC and E. coli. Patient typically wears a diaper but daughter collected urine sample from clean commode at home. Patient was started on Macrobid 100mg BID, only received 1 dose last night and was not able to take 2nd dose this AM due to confusion. Daughter was concerned that patient was having a stroked and called EMS. Upon EMS arrival, patient was found to be febrile with Tmax 100.4. Daughter did note that patient also had elevated temperatures in the week prior, ~100deg. Patient did not receive antipyretic preiot to ED presentation. Of note, patient was most recently admitted to [facility] in 11/2020 with L hip fracture s/p fall. She is now s/p L manas hip arthroplasty on 11/16/20 after which patient was started on Xarelto for total of 35 days. Daughter endorses new dry cough x1d, decreased appetite, residual discomfort of LLE since ortho surgery, and stage 1 buttock ulcer as patient is primarily sedentary. Last BM yesterday. Daughter denies mother complaining of headache, chest pain, SOB, abd pain, N/V/D/C, or skin changes. Daughter denies known sick contacts and travel.    In the ED  VS: T 99.9 HR 77 /79-->122/64 RR 18 SpO2 95% on RA  No significant labs. COVID PCR (-).  UA with negative nitrites/LE, moderate blood, and occasional bacteria.  Ct head: extensive chronic ischemic changes, no acute infarct or hemorrhage  CXR: Mild generalized nonspecific bronchovascular markings unchanged from prior imaging, likely chronic. No consolidation, possible trace L pleural effusion.  EKG: NSR, possible LAE, possible age-undetermined anterior and inferior infarcts    Patient received 1850cc NS and Tylenol supp 650mg. (05 Jan 2021 20:00)      ---  HOSPITAL COURSE: Pt found to have altered mental status. She is being treated for a UTI and was evaluated for CVA rule out.   ---  CONSULTANTS:        FROM ADMISSION H+P:   HPI:  88yo F, with PMH/o baseline mild dementia per daughter, HTN, hypothyroidism, congenital unilateral kidney, and autoimmune hepatitis, BIBEMS from home for fever, increased AMS, weakness, inability to ambulate per daughter. Patient is unable to provide further history at this time. History provided by daughter, Amina. Daughter noted patient had malodorous and dark urine since [facility] [place] [facility]. This prompted a televisit with PCP Dr. Godinez who performed UA and Ucx which found urinary WBC and E. coli. Patient typically wears a diaper but daughter collected urine sample from clean commode at home. Patient was started on Macrobid 100mg BID, only received 1 dose last night and was not able to take 2nd dose this AM due to confusion. Daughter was concerned that patient was having a stroked and called EMS. Upon EMS arrival, patient was found to be febrile with Tmax 100.4. Daughter did note that patient also had elevated temperatures in the week prior, ~100deg. Patient did not receive antipyretic preiot to ED presentation. Of note, patient was most recently admitted to [facility] in 11/2020 with L hip fracture s/p fall. She is now s/p L manas hip arthroplasty on 11/16/20 after which patient was started on Xarelto for total of 35 days. Daughter endorses new dry cough x1d, decreased appetite, residual discomfort of LLE since ortho surgery, and stage 1 buttock ulcer as patient is primarily sedentary. Last BM yesterday. Daughter denies mother complaining of headache, chest pain, SOB, abd pain, N/V/D/C, or skin changes. Daughter denies known sick contacts and travel.    In the ED  VS: T 99.9 HR 77 /79-->122/64 RR 18 SpO2 95% on RA  No significant labs. COVID PCR (-).  UA with negative nitrites/LE, moderate blood, and occasional bacteria.  Ct head: extensive chronic ischemic changes, no acute infarct or hemorrhage  CXR: Mild generalized nonspecific bronchovascular markings unchanged from prior imaging, likely chronic. No consolidation, possible trace L pleural effusion.  EKG: NSR, possible LAE, possible age-undetermined anterior and inferior infarcts    Patient received 1850cc NS and Tylenol supp 650mg. (05 Jan 2021 20:00)      ---  HOSPITAL COURSE: Pt found to have altered mental status. She is being treated for a UTI and was evaluated for CVA with an MRI that showed no acute changes. She had a bedside speech and swallow done and was placed on a mechanical soft diet. Pt stable for transfer to [place] for bed availability.            FROM ADMISSION H+P:   HPI:  88yo F, with PMH/o baseline mild dementia per daughter, HTN, hypothyroidism, congenital unilateral kidney, and autoimmune hepatitis, BIBEMS from home for fever, increased AMS, weakness, inability to ambulate per daughter. Patient is unable to provide further history at this time. History provided by daughter, Amina. Daughter noted patient had malodorous and dark urine since [facility] [place] [facility]. This prompted a televisit with PCP Dr. Godinez who performed UA and Ucx which found urinary WBC and E. coli. Patient typically wears a diaper but daughter collected urine sample from clean commode at home. Patient was started on Macrobid 100mg BID, only received 1 dose last night and was not able to take 2nd dose this AM due to confusion. Daughter was concerned that patient was having a stroked and called EMS. Upon EMS arrival, patient was found to be febrile with Tmax 100.4. Daughter did note that patient also had elevated temperatures in the week prior, ~100deg. Patient did not receive antipyretic preiot to ED presentation. Of note, patient was most recently admitted to [facility] in 11/2020 with L hip fracture s/p fall. She is now s/p L manas hip arthroplasty on 11/16/20 after which patient was started on Xarelto for total of 35 days. Daughter endorses new dry cough x1d, decreased appetite, residual discomfort of LLE since ortho surgery, and stage 1 buttock ulcer as patient is primarily sedentary. Last BM yesterday. Daughter denies mother complaining of headache, chest pain, SOB, abd pain, N/V/D/C, or skin changes. Daughter denies known sick contacts and travel.    In the ED  VS: T 99.9 HR 77 /79-->122/64 RR 18 SpO2 95% on RA  No significant labs. COVID PCR (-).  UA with negative nitrites/LE, moderate blood, and occasional bacteria.  Ct head: extensive chronic ischemic changes, no acute infarct or hemorrhage  CXR: Mild generalized nonspecific bronchovascular markings unchanged from prior imaging, likely chronic. No consolidation, possible trace L pleural effusion.  EKG: NSR, possible LAE, possible age-undetermined anterior and inferior infarcts    Patient received 1850cc NS and Tylenol supp 650mg. (05 Jan 2021 20:00)    ---  HOSPITAL COURSE: Pt found to have altered mental status. She is being treated for a UTI and was evaluated for CVA with an MRI that showed no acute changes. She had a bedside speech and swallow done and was placed on a mechanical soft diet. Pt stable for transfer to [place] for bed availability.            FROM ADMISSION H+P:   HPI:  88yo F, with PMH/o baseline mild dementia per daughter, HTN, hypothyroidism, congenital unilateral kidney, and autoimmune hepatitis, BIBEMS from home for fever, increased AMS, weakness, inability to ambulate per daughter. Patient is unable to provide further history at this time. History provided by daughter, Amina. Daughter noted patient had malodorous and dark urine since [facility] [place] [facility]. This prompted a televisit with PCP Dr. Godinez who performed UA and Ucx which found urinary WBC and E. coli. Patient typically wears a diaper but daughter collected urine sample from clean commode at home. Patient was started on Macrobid 100mg BID, only received 1 dose last night and was not able to take 2nd dose this AM due to confusion. Daughter was concerned that patient was having a stroked and called EMS. Upon EMS arrival, patient was found to be febrile with Tmax 100.4. Daughter did note that patient also had elevated temperatures in the week prior, ~100deg. Patient did not receive antipyretic preiot to ED presentation. Of note, patient was most recently admitted to [facility] in 11/2020 with L hip fracture s/p fall. She is now s/p L manas hip arthroplasty on 11/16/20 after which patient was started on Xarelto for total of 35 days. Daughter endorses new dry cough x1d, decreased appetite, residual discomfort of LLE since ortho surgery, and stage 1 buttock ulcer as patient is primarily sedentary. Last BM yesterday. Daughter denies mother complaining of headache, chest pain, SOB, abd pain, N/V/D/C, or skin changes. Daughter denies known sick contacts and travel.    In the ED  VS: T 99.9 HR 77 /79-->122/64 RR 18 SpO2 95% on RA  No significant labs. COVID PCR (-).  UA with negative nitrites/LE, moderate blood, and occasional bacteria.  Ct head: extensive chronic ischemic changes, no acute infarct or hemorrhage  CXR: Mild generalized nonspecific bronchovascular markings unchanged from prior imaging, likely chronic. No consolidation, possible trace L pleural effusion.  EKG: NSR, possible LAE, possible age-undetermined anterior and inferior infarcts    Patient received 1850cc NS and Tylenol supp 650mg. (05 Jan 2021 20:00)    ---  HOSPITAL COURSE: Pt found to have altered mental status. She is being treated for a UTI and was evaluated for CVA with an MRI that showed no acute changes. She had a bedside speech and swallow done and was placed on a mechanical soft diet. Daughter Amina contacted and informed about possible transfer to [place]. She agreeable to transfer. Pt stable for transfer to [place] for bed availability.            FROM ADMISSION H+P:   HPI:  89yo F, with PMH/o baseline mild dementia per daughter, HTN, hypothyroidism, congenital unilateral kidney, and autoimmune hepatitis, BIBEMS from home for fever, increased AMS, weakness, inability to ambulate per daughter. Patient is unable to provide further history at this time. History provided by daughter, Amina. Daughter noted patient had malodorous and dark urine [facility] [place] [facility] This prompted a televisit with PCP Dr. Godinez who performed UA and Ucx which found urinary WBC and E. coli. Patient typically wears a diaper but daughter collected urine sample from clean commode at home. Patient was started on Macrobid 100mg BID, only received 1 dose last night and was not able to take 2nd dose this AM due to confusion. Daughter was concerned that patient was having a stroked and called EMS. Upon EMS arrival, patient was found to be febrile with Tmax 100.4. Daughter did note that patient also had elevated temperatures in the week prior, ~100deg. Patient did not receive antipyretic preiot to ED presentation. Of note, patient was most recently admitted to [facility] in 11/2020 with L hip fracture s/p fall. She is now s/p L manas hip arthroplasty on 11/16/20 after which patient was started on Xarelto for total of 35 days. Daughter endorses new dry cough x1d, decreased appetite, residual discomfort of LLE since ortho surgery, and stage 1 buttock ulcer as patient is primarily sedentary. Last BM yesterday. Daughter denies mother complaining of headache, chest pain, SOB, abd pain, N/V/D/C, or skin changes. Daughter denies known sick contacts and travel.      ---  HOSPITAL COURSE: Pt found to have altered mental status acute metabolic encephalopathy    sec to  UTI     and was evaluated for CVA with an MRI that showed no acute changes  no acute cva . She had a bedside speech and swallow done and was placed on a mechanical soft diet. Daughter Amina contacted and informed about possible transfer to [place]. She agreeable to transfer. Pt stable for transfer to [place] for bed availability.   Vital Signs Last 24 Hrs  T(C): 37.2 (06 Jan 2021 11:00), Max: 38.3 (05 Jan 2021 13:15)  T(F): 99 (06 Jan 2021 11:00), Max: 100.9 (05 Jan 2021 13:15)  HR: 84 (06 Jan 2021 11:00) (69 - 94)  BP: 145/74 (06 Jan 2021 11:00) (122/64 - 178/79)  BP(mean): --  RR: 18 (06 Jan 2021 11:00) (17 - 18)  SpO2: 95% (06 Jan 2021 11:00) (93% - 99%)   GEN no distress , HEENT nt/nc , perrla , CVS s1s2 no tachy , CHEST bl air entry   present , no rale   , CNS aao/2 , no focal deficit , GI soft , bs present ,  intact , EXT no edema, pedal pulse present , SKIN no rash.

## 2021-01-06 NOTE — ED ADULT NURSE REASSESSMENT NOTE - NS ED NURSE REASSESS COMMENT FT1
Attemp to give report to Roslyn RN receiving patient to room 221. Was told the receiving RN will call back.

## 2021-01-06 NOTE — DISCHARGE NOTE PROVIDER - NSDCCPCAREPLAN_GEN_ALL_CORE_FT
PRINCIPAL DISCHARGE DIAGNOSIS  Diagnosis: Confusion  Assessment and Plan of Treatment: You came in with confusion      SECONDARY DISCHARGE DIAGNOSES  Diagnosis: Weakness  Assessment and Plan of Treatment:      PRINCIPAL DISCHARGE DIAGNOSIS  Diagnosis: Confusion  Assessment and Plan of Treatment: You came in with confusion associated with a fever. You had an MRI of your brain, which did not show any changes. You were found to have a UTI, which is likely to cause of your confusion.      SECONDARY DISCHARGE DIAGNOSES  Diagnosis: UTI (urinary tract infection)  Assessment and Plan of Treatment: You were found ot have a UTI which likely contributed ot your confusion. Please continue to take antibiotics as prescribed.    Diagnosis: Weakness  Assessment and Plan of Treatment: Your weakness if likely due to your recent hip sugery, mental status changes and UTI.  Continue antibiotics for your UTI and work with PT.    Diagnosis: Stage 1 decubitus ulcer  Assessment and Plan of Treatment: This is likely due to your weakness and decreased mobility.   Please continue to reposition to prevent further skin breakdown      Diagnosis: Hypothyroid  Assessment and Plan of Treatment: You have hypothyroid   Please continue your synthroid as prescribed    Diagnosis: Essential hypertension  Assessment and Plan of Treatment: You have a history of high blood pressure for which you should continue to take metoprolol 50mg at night.    Diagnosis: Depression  Assessment and Plan of Treatment: You have a history of depression for which you should continue to take lexapro 20mg daily.     PRINCIPAL DISCHARGE DIAGNOSIS  Diagnosis: Confusion  Assessment and Plan of Treatment: You came in with confusion associated with a fever. You had an MRI of your brain, which did not show any changes. You were found to have a UTI, which is likely to cause of your confusion , fu urine cult pending if neg dc abx      SECONDARY DISCHARGE DIAGNOSES  Diagnosis: Depression  Assessment and Plan of Treatment: You have a history of depression for which you should continue to take lexapro 20mg daily.    Diagnosis: Essential hypertension  Assessment and Plan of Treatment: You have a history of high blood pressure for which you should continue to take metoprolol 50mg at night.    Diagnosis: Hypothyroid  Assessment and Plan of Treatment: You have hypothyroid   Please continue your synthroid as prescribed    Diagnosis: UTI (urinary tract infection)  Assessment and Plan of Treatment: You were found ot have a UTI which likely contributed ot your confusion. Please continue to take antibiotics as prescribed.    Diagnosis: Stage 1 decubitus ulcer  Assessment and Plan of Treatment: This is likely due to your weakness and decreased mobility.   Please continue to reposition to prevent further skin breakdown

## 2021-01-06 NOTE — CONSULT NOTE ADULT - ATTENDING COMMENTS
Infectious Diseases will continue to follow. Please call with any questions.   Marilyn Navarrete M.D.  [facility], Division of Infectious Diseases [phone]  For over the weekend and after hours, please call [phone]

## 2021-01-06 NOTE — ED ADULT NURSE REASSESSMENT NOTE - NS ED NURSE REASSESS COMMENT FT1
Attempt to give [place] RN report for over 1 hour. Attempt to call back two times with no success. Transfer in progress, patient being taken to [place] at this time. Charge RN Rossy made aware.

## 2021-01-06 NOTE — H&P ADULT - NSHPSOCIALHISTORY_GEN_ALL_CORE
lives with daughter Amina who is primary care taker  Requires assistance with all ADLs  Ambulates minimally with walker  Denies tobacco, EtOH, or drug use  Received both flu and PNA vaccines

## 2021-01-06 NOTE — H&P ADULT - ASSESSMENT
89F with PMH baseline mild dementia, HTN, hypothyroidism, congenital single kidney, and autoimmune hepatitis, presented with increased confusion and weakness due to possible metabolic encephalopathy due to UTI vs. TIA

## 2021-01-07 LAB
ANION GAP SERPL CALC-SCNC: 9 MMOL/L — SIGNIFICANT CHANGE UP (ref 5–17)
BUN SERPL-MCNC: 9 MG/DL — SIGNIFICANT CHANGE UP (ref 7–23)
CALCIUM SERPL-MCNC: 8.5 MG/DL — SIGNIFICANT CHANGE UP (ref 8.4–10.5)
CHLORIDE SERPL-SCNC: 102 MMOL/L — SIGNIFICANT CHANGE UP (ref 96–108)
CO2 SERPL-SCNC: 25 MMOL/L — SIGNIFICANT CHANGE UP (ref 22–31)
CREAT SERPL-MCNC: 0.57 MG/DL — SIGNIFICANT CHANGE UP (ref 0.5–1.3)
GLUCOSE SERPL-MCNC: 103 MG/DL — HIGH (ref 70–99)
HCT VFR BLD CALC: 32.7 % — LOW (ref 34.5–45)
HGB BLD-MCNC: 10.6 G/DL — LOW (ref 11.5–15.5)
MCHC RBC-ENTMCNC: 29.1 PG — SIGNIFICANT CHANGE UP (ref 27–34)
MCHC RBC-ENTMCNC: 32.4 GM/DL — SIGNIFICANT CHANGE UP (ref 32–36)
MCV RBC AUTO: 89.8 FL — SIGNIFICANT CHANGE UP (ref 80–100)
NRBC # BLD: 0 /100 WBCS — SIGNIFICANT CHANGE UP (ref 0–0)
PLATELET # BLD AUTO: 255 K/UL — SIGNIFICANT CHANGE UP (ref 150–400)
POTASSIUM SERPL-MCNC: 3.2 MMOL/L — LOW (ref 3.5–5.3)
POTASSIUM SERPL-SCNC: 3.2 MMOL/L — LOW (ref 3.5–5.3)
RBC # BLD: 3.64 M/UL — LOW (ref 3.8–5.2)
RBC # FLD: 13.7 % — SIGNIFICANT CHANGE UP (ref 10.3–14.5)
SARS-COV-2 IGG SERPL QL IA: NEGATIVE — SIGNIFICANT CHANGE UP
SARS-COV-2 IGM SERPL IA-ACNC: 0.06 INDEX — SIGNIFICANT CHANGE UP
SODIUM SERPL-SCNC: 136 MMOL/L — SIGNIFICANT CHANGE UP (ref 135–145)
T4 FREE SERPL-MCNC: 1.5 NG/DL — SIGNIFICANT CHANGE UP (ref 0.9–1.8)
TSH SERPL-MCNC: 0.95 UIU/ML — SIGNIFICANT CHANGE UP (ref 0.27–4.2)
WBC # BLD: 8.33 K/UL — SIGNIFICANT CHANGE UP (ref 3.8–10.5)
WBC # FLD AUTO: 8.33 K/UL — SIGNIFICANT CHANGE UP (ref 3.8–10.5)

## 2021-01-07 PROCEDURE — 99233 SBSQ HOSP IP/OBS HIGH 50: CPT

## 2021-01-07 RX ORDER — POTASSIUM CHLORIDE 20 MEQ
40 PACKET (EA) ORAL ONCE
Refills: 0 | Status: COMPLETED | OUTPATIENT
Start: 2021-01-07 | End: 2021-01-07

## 2021-01-07 RX ADMIN — ENOXAPARIN SODIUM 40 MILLIGRAM(S): 100 INJECTION SUBCUTANEOUS at 11:31

## 2021-01-07 RX ADMIN — Medication 1 TABLET(S): at 06:01

## 2021-01-07 RX ADMIN — Medication 50 MILLIGRAM(S): at 11:31

## 2021-01-07 RX ADMIN — CEFTRIAXONE 100 MILLIGRAM(S): 500 INJECTION, POWDER, FOR SOLUTION INTRAMUSCULAR; INTRAVENOUS at 13:13

## 2021-01-07 RX ADMIN — Medication 40 MILLIEQUIVALENT(S): at 11:31

## 2021-01-07 RX ADMIN — Medication 75 MICROGRAM(S): at 06:01

## 2021-01-07 RX ADMIN — Medication 1 TABLET(S): at 17:21

## 2021-01-07 RX ADMIN — Medication 81 MILLIGRAM(S): at 11:31

## 2021-01-07 NOTE — DIETITIAN INITIAL EVALUATION ADULT. - PROBLEM SELECTOR PLAN 2
will try to obtain cx from Dr Godinez's office.   CASSANDRA vaz at [facility] noted - start on Rocephin and monitor patient for further fever.  Will be seen here tomorrow.

## 2021-01-07 NOTE — PROGRESS NOTE ADULT - SUBJECTIVE AND OBJECTIVE BOX
Patient is a 89y old  Female who presents with a chief complaint of confusion    INTERVAL HPI/OVERNIGHT EVENTS: sleepy this morning, arousable, denies pain.     MEDICATIONS  (STANDING):  aspirin enteric coated 81 milliGRAM(s) Oral daily  cefTRIAXone   IVPB 1000 milliGRAM(s) IV Intermittent every 24 hours  enoxaparin Injectable 40 milliGRAM(s) SubCutaneous daily  lactobacillus acidophilus 1 Tablet(s) Oral two times a day  levothyroxine 75 MICROGram(s) Oral daily  metoprolol succinate ER 50 milliGRAM(s) Oral daily  potassium chloride    Tablet ER 40 milliEquivalent(s) Oral once    MEDICATIONS  (PRN):      Allergies  latex (Rash)  penicillin (Rash)  sulfa drugs (Rash)    REVIEW OF SYSTEMS:  unable to obtain    Vital Signs Last 24 Hrs  T(C): 37.2 (07 Jan 2021 06:15), Max: 37.9 (07 Jan 2021 05:40)  T(F): 98.9 (07 Jan 2021 06:15), Max: 100.3 (07 Jan 2021 05:40)  HR: 60 (07 Jan 2021 06:15) (60 - 97)  BP: 145/65 (07 Jan 2021 05:40) (145/65 - 173/84)  BP(mean): --  RR: 18 (07 Jan 2021 05:40) (17 - 18)  SpO2: 97% (07 Jan 2021 05:40) (94% - 99%)    PHYSICAL EXAM:  GENERAL: NAD  HEAD:  Atraumatic, Normocephalic  EYES: conjunctiva and sclera clear  NECK: Supple, No JVD  NERVOUS SYSTEM:  Awake and alert, answering simple questions, moving extremities.   CHEST/LUNG: Clear to auscultation bilaterally;   HEART: Regular rate and rhythm  ABDOMEN: Soft, Nontender, Nondistended; Bowel sounds present  EXTREMITIES:  2+ Peripheral Pulses, No clubbing, cyanosis, or edema      LABS:                        10.6   8.33  )-----------( 255      ( 07 Jan 2021 07:43 )             32.7     07 Jan 2021 07:43    136    |  102    |  9      ----------------------------<  103    3.2     |  25     |  0.57     Ca    8.5        07 Jan 2021 07:43      PT/INR - ( 05 Jan 2021 14:36 )   PT: 13.3 sec;   INR: 1.14 ratio    PTT - ( 05 Jan 2021 14:36 )  PTT:27.4 sec    CAPILLARY BLOOD GLUCOSE          RADIOLOGY & ADDITIONAL TESTS:    Imaging Personally Reviewed:  [ ] YES     Consultant(s) Notes Reviewed:      Care Discussed with Consultants/Other Providers:     Advanced Directives: [ ] DNR  [ ] No feeding tube  [ ] MOLST in chart  [ ] MOLST completed today  [ ] Unknown

## 2021-01-07 NOTE — SWALLOW BEDSIDE ASSESSMENT ADULT - SWALLOW EVAL: PROGNOSIS
DIAGNOSIS CONT: thick, and thin liquid textures marked by a suspected delayed pharyngeal swallow trigger with hyolaryngeal elevation noted upon digital palpation resulting in immediate, reflexive weak throat clearing/coughing response indicative of airway penetration.                                                                                                                                                                                                                                               PROGNOSIS: Pending MBSS

## 2021-01-07 NOTE — DIETITIAN INITIAL EVALUATION ADULT. - OTHER INFO
89F with PMH baseline mild dementia, HTN, hypothyroidism, congenital single kidney, and autoimmune hepatitis, presented with increased confusion and weakness due to possible metabolic encephalopathy due to UTI vs. TIA.    Pt seen for SLP evaluation 1/6, recommending mech soft consistency with thin liquids.  Pt minimally verbal at time of visit, appears to be able to answer basic yes/no questions about self appropriately.  Pt's daughter called [phone]) to obtain nutrition related hx.  Pt with previous hospital admission in November and was discharged to rehab; noted pt returned home where she lives with dtr in late Dec 2020.  Dtr reports pt usually consumes 3x meals per day, although portions are generally smaller.  Food preferences obtained to optimize po intake - diet office informed.  Dtr reports pt usually consumes 1x Ensure supplement daily (prefers vanilla flavor).  Dtr reports pt's usual body weight is 140-145#, stated recent wt from rehab ~138#, indicating up to 4.8% x >1 month (not significant).  Noted stage I to sacrum.  Recommend adding ensure enlive bid to supplement variable po intake, deter wt loss; will also trial magic cup fortified ice cream bid.

## 2021-01-07 NOTE — SWALLOW BEDSIDE ASSESSMENT ADULT - PHARYNGEAL PHASE
Delayed pharyngeal swallow/Decreased laryngeal elevation Delayed pharyngeal swallow/Decreased laryngeal elevation/Cough post oral intake/Throat clear post oral intake

## 2021-01-07 NOTE — SWALLOW BEDSIDE ASSESSMENT ADULT - SWALLOW EVAL: RECOMMENDED FEEDING/EATING TECHNIQUES
allow for swallow between intakes/alternate food with liquid/check mouth frequently for oral residue/pocketing/crush medication (when feasible)/maintain upright posture during/after eating for 30 mins/no straws/oral hygiene/position upright (90 degrees)/small sips/bites

## 2021-01-07 NOTE — PROGRESS NOTE ADULT - SUBJECTIVE AND OBJECTIVE BOX
[facility], Division of Infectious Diseases  BARBARA Vaughan Y. Patel, S. Shah  [phone]    Name: JOESPH MAYBERRY  Age: 89y  Gender: Female  MRN: [id number]  Note Date: 21    Interval History:  Patient seen and examined at bedside this morning  No acute overnight events. Slight low grade fever this .3  more awake, alert than yesterday, feeling better  Notes reviewed    Antibiotics:  cefTRIAXone   IVPB 1000 milliGRAM(s) IV Intermittent every 24 hours      Medications:  aspirin enteric coated 81 milliGRAM(s) Oral daily  cefTRIAXone   IVPB 1000 milliGRAM(s) IV Intermittent every 24 hours  enoxaparin Injectable 40 milliGRAM(s) SubCutaneous daily  lactobacillus acidophilus 1 Tablet(s) Oral two times a day  levothyroxine 75 MICROGram(s) Oral daily  metoprolol succinate ER 50 milliGRAM(s) Oral daily      Review of Systems:  A 10-point review of systems was obtained.     Pertinent positives and negatives--  Constitutional: No fevers. No Chills. No Rigors.   Cardiovascular: No chest pain. No palpitations.  Respiratory: No shortness of breath. No cough.  Gastrointestinal: No nausea or vomiting. No diarrhea or constipation.   Psychiatric: Pleasant. Appropriate affect.    Review of systems otherwise negative except as previously noted.    Allergies: latex (Rash)  penicillin (Rash)  sulfa drugs (Rash)    For details regarding the patient's past medical history, social history, family history, and other miscellaneous elements, please refer the initial infectious diseases consultation and/or the admitting history and physical examination for this admission.    Objective:  Vitals:   T(C): 37 (21 @ 10:55), Max: 37.9 (21 @ 05:40)  HR: 70 (21 @ 10:55) (60 - 97)  BP: 162/69 (21 @ 10:55) (145/65 - 173/84)  RR: 18 (21 @ 10:55) (17 - 18)  SpO2: 98% (21 @ 10:55) (94% - 99%)    Physical Examination:  General: no acute distress  HEENT: NC/AT, EOMI, anicteric, no oral lesions  Neck: supple, no palpable LAD  Cardio: S1, S2 heard, RRR, no murmurs  Resp: breath sounds heard bilaterally, no rales, wheezes or rhonchi  Abd: soft, NT, ND, + bowel sounds  Neuro: AAOx3, no obvious focal deficits  Ext: no edema or cyanosis  Skin: warm, dry, no visible rash    Laboratory Studies:  CBC:                       10.6   8.33  )-----------( 255      ( 2021 07:43 )             32.7     CMP:     136  |  102  |  9   ----------------------------<  103<H>  3.2<L>   |  25  |  0.57    Ca    8.5      2021 07:43    TPro  7.2  /  Alb  2.4<L>  /  TBili  0.5  /  DBili  x   /  AST  21  /  ALT  12  /  AlkPhos  56      LIVER FUNCTIONS - ( 2021 07:24 )  Alb: 2.4 g/dL / Pro: 7.2 g/dL / ALK PHOS: 56 U/L / ALT: 12 U/L / AST: 21 U/L / GGT: x           Urinalysis Basic - ( 2021 14:36 )    Color: Yellow / Appearance: Clear / S.015 / pH: x  Gluc: x / Ketone: Negative  / Bili: Negative / Urobili: Negative   Blood: x / Protein: 15 / Nitrite: Negative   Leuk Esterase: Negative / RBC: 3-5 /HPF / WBC 0-2   Sq Epi: x / Non Sq Epi: Occasional / Bacteria: Occasional      Microbiology: reviewed    Culture - Urine (collected 21 @ 18:54)  Source: .Urine Clean Catch (Midstream)  Final Report (21 @ 15:27):    <10,000 CFU/mL Normal Urogenital Modesta    Culture - Blood (collected 21 @ 18:51)  Source: .Blood Blood-Peripheral  Preliminary Report (21 @ 19:01):    No growth to date.    Culture - Blood (collected 21 @ 18:51)  Source: .Blood Blood-Peripheral  Preliminary Report (21 @ 19:01):    No growth to date.        Radiology: reviewed  < from: US Renal (21 @ 15:15) >    EXAM:  US KIDNEY(S)                                  PROCEDURE DATE:  2021          INTERPRETATION:  CLINICAL INFORMATION: UTI    COMPARISON: None available.    TECHNIQUE: Sonography of the kidneys . Study is limited due to patient's inability to cooperate.    FINDINGS:    Right kidney: Surgically absent. No abnormality in the surgical bed    Left kidney: 11.3 cm. No renal mass, hydronephrosis or calculi.    IMPRESSION:    Limited study demonstrates unremarkable appearance of the left kidney and right nephrectomy bed              SENG UP MD; Attending Radiologist  This document has been electronically signed. 2021  3:54PM    < end of copied text >

## 2021-01-07 NOTE — SWALLOW BEDSIDE ASSESSMENT ADULT - ASR SWALLOW REFERRAL
Consider a nutritional consult to ensure adequate daily caloric intake given current dysphagia state./Registered Dietitian

## 2021-01-07 NOTE — PROGRESS NOTE ADULT - ASSESSMENT
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   Was recently dx w/ UTI as outpatient, unable to take medications.   Recent admission for L hip fracture s/p L manas hip arthroplasty on 11/16/20.     Acute cystitis  Fevers  Leukocytosis-resolved  -UA mildly positive--likely in the setting of already being started on Abx  -Ucx/BCx NGTD  -imaging reviewed, renal sono w/o evidence of upper urinary tract disease  Was able to review outpt cx; pansensitive E.coli  -fever continues to remain low grade, likely 2/2 acute infection and in line w/ course of disease  -c/w ceftriaxone 1gm q24h while inpatient  When patient is ready for discharge can switch to PO cefpodoxime 100mg BID to complete total 7 day course until 1/12/2020    Lethargy/AMS  -lethargy/AMS likely 2/2 acute infection  -acute CVA r/o per imaging as above  -maintain aspiration precautions    Penicillin Allergy  -rash to penicillin noted, however low cross reactivity w/ cephalosporins  Pt tolerating ceftriaxone well, ok to use in future

## 2021-01-07 NOTE — SWALLOW BEDSIDE ASSESSMENT ADULT - SWALLOW EVAL: DIAGNOSIS
1. The patient demonstrated a mild oral dysphagia for puree and honey thick liquids marked by delayed bolus collection, transfer, and posterior transport. 2. The patient demonstrated a mild-moderate oral dysphagia for soft solids marked by prolonged oral manipulation and decreased mastication abilities resulting in delayed posterior transport in the oral cavity. Mild lingual residue visualized subsequent to deglutition which reduced with utilization of a liquid wash x2. 3. The patient demonstrated a mild-moderate oral dysphagia for nectar thick and thin liquid textures marked by delayed bolus collection and transfer with suspected posterior loss over the base of tongue. 4. The patient demonstrated a mild pharyngeal dysphagia for puree and honey thick liquids marked by a suspected delayed pharyngeal swallow trigger with hyolaryngeal elevation noted upon digital palpation w/o evidence of airway penetration. 5. The patient demonstrated a severe pharyngeal dysphagia for soft solid, nectar

## 2021-01-07 NOTE — SWALLOW BEDSIDE ASSESSMENT ADULT - ASR SWALLOW RECOMMEND DIAG
An MBSS is recommended to objectively assess oral-pharyngeal swallow function to determine safest and least restrictive PO diet./VFSS/MBS

## 2021-01-07 NOTE — PROGRESS NOTE ADULT - SUBJECTIVE AND OBJECTIVE BOX
Neurology follow up note    JOESPH [id number]      Interval History:    Patient feels ok no new complaints.    MEDICATIONS    aspirin enteric coated 81 milliGRAM(s) Oral daily  cefTRIAXone   IVPB 1000 milliGRAM(s) IV Intermittent every 24 hours  enoxaparin Injectable 40 milliGRAM(s) SubCutaneous daily  lactobacillus acidophilus 1 Tablet(s) Oral two times a day  levothyroxine 75 MICROGram(s) Oral daily  metoprolol succinate ER 50 milliGRAM(s) Oral daily      Allergies    latex (Rash)  penicillin (Rash)  sulfa drugs (Rash)    Intolerances            Vital Signs Last 24 Hrs  T(C): 37 (2021 10:55), Max: 37.9 (2021 05:40)  T(F): 98.6 (2021 10:55), Max: 100.3 (2021 05:40)  HR: 70 (2021 10:55) (60 - 97)  BP: 162/69 (2021 10:55) (145/65 - 173/84)  BP(mean): --  RR: 18 (2021 10:55) (17 - 18)  SpO2: 98% (2021 10:55) (94% - 99%)           REVIEW OF SYSTEMS:  Constitutional:  The patient denies fever, chills, or night sweats.  Head:  No headaches.  Eyes:  No double vision or blurry vision.  Ears:  No ringing in the ears.  Neck:  No neck pain.  Respiratory:  No shortness of breath.  Cardiovascular:  No chest pain.  Abdomen:  No nausea, vomiting, or abdominal pain.  Extremities/Neurological:  No numbness or tingling.  Musculoskeletal:  Positive history of joint pain, also foot pain.    PHYSICAL EXAMINATION: HEENT:  Head:  Normocephalic, atraumatic.  Eyes:  No scleral icterus.  Ears:  Hearing bilaterally intact.  NECK:  Supple.  RESPIRATORY:  Decreased breath sounds bilaterally, but gives poor effort.  CARDIOVASCULAR:  S1 and S2 heard.  ABDOMEN:  Soft and nontender.  EXTREMITIES:  No clubbing or cyanosis were noted.      NEUROLOGIC:  The patient is awake and alert.  able to name objects  Recall was 0/3, was able to name simple objects.  Extraocular movements were intact.  Pupils were equal, round, and reactive bilaterally 3 mm to 2 mm.  Speech was fluent.  Smile was symmetric.  Motor:  Bilateral upper were 4/5, bilateral lower were 3/5.  Sensory:  Bilateral upper and lower intact to light touch.         LABS:  CBC Full  -  ( 2021 07:43 )  WBC Count : 8.33 K/uL  RBC Count : 3.64 M/uL  Hemoglobin : 10.6 g/dL  Hematocrit : 32.7 %  Platelet Count - Automated : 255 K/uL  Mean Cell Volume : 89.8 fl  Mean Cell Hemoglobin : 29.1 pg  Mean Cell Hemoglobin Concentration : 32.4 gm/dL  Auto Neutrophil # : x  Auto Lymphocyte # : x  Auto Monocyte # : x  Auto Eosinophil # : x  Auto Basophil # : x  Auto Neutrophil % : x  Auto Lymphocyte % : x  Auto Monocyte % : x  Auto Eosinophil % : x  Auto Basophil % : x    Urinalysis Basic - ( 2021 14:36 )    Color: Yellow / Appearance: Clear / S.015 / pH: x  Gluc: x / Ketone: Negative  / Bili: Negative / Urobili: Negative   Blood: x / Protein: 15 / Nitrite: Negative   Leuk Esterase: Negative / RBC: 3-5 /HPF / WBC 0-2   Sq Epi: x / Non Sq Epi: Occasional / Bacteria: Occasional      -    136  |  102  |  9   ----------------------------<  103<H>  3.2<L>   |  25  |  0.57    Ca    8.5      2021 07:43    TPro  7.2  /  Alb  2.4<L>  /  TBili  0.5  /  DBili  x   /  AST  21  /  ALT  12  /  AlkPhos  56  -06    Hemoglobin A1C:     LIVER FUNCTIONS - ( 2021 07:24 )  Alb: 2.4 g/dL / Pro: 7.2 g/dL / ALK PHOS: 56 U/L / ALT: 12 U/L / AST: 21 U/L / GGT: x           Vitamin B12   PT/INR - ( 2021 14:36 )   PT: 13.3 sec;   INR: 1.14 ratio         PTT - ( 2021 14:36 )  PTT:27.4 sec      RADIOLOGY  ANALYSIS AND PLAN:  An 89-year-old with a history of altered mental status and ataxia.  For episode of altered mental status, suspect most likely metabolic encephalopathy, possibly secondary to underlying viral type syndrome, questionable any side effects of Macrobid, also possibly secondary to progressive mild cognitive impairment versus subtle dementia.  MRI is negative for cerebrovascular accident.  Infection workup as needed.  For mild cognitive impairment, supportive therapy.  For history of ataxia secondary to age-related changes, deconditioning.  For hypothyroidism, continue the patient on Synthroid.  For hypertension, continue the patient on her blood pressure medication.  The patient's functional and cognitive status for dementia was evaluated to the best of my ability.  Safety concerns were provided to the family.  Advance care directives were discussed with the family.  The daughter's name is Amina, her telephone number is [phone].  Her cell number is [phone]    Greater than 45 minutes of time was spent with the patient, plan of care, reviewing data, speaking to the family and  50% of the visit was spent counseling and/or coordinating care with multidisciplinary healthcare team

## 2021-01-07 NOTE — PROGRESS NOTE ADULT - ATTENDING COMMENTS
Infectious Diseases will continue to follow. Please call with any questions.   Marilyn Navarrete M.D.  [facility], Division of Infectious Diseases [phone]  For over the weekend and after hours, please call [phone]

## 2021-01-07 NOTE — SWALLOW BEDSIDE ASSESSMENT ADULT - COMMENTS
Consult received and chart reviewed. The patient was seen at bedside this afternoon for an assessment of swallow function, at which time she was awake and cooperative. Patient currently receiving supplemental O2 via NC in place. The patient was seen by this department on 1/6/20 (please see full report for details) at [facility] before being transferred to [place], at which time a mechanical soft solid with thin liquid diet was recommended.    Per charting, the patient is an "89F with PMH baseline mild dementia, HTN, hypothyroidism, congenital single kidney, and autoimmune hepatitis, presented with increased confusion and weakness due to possible metabolic encephalopathy due to UTI vs. TIA."    WBC is WFL. MRI of the head revealed, "No acute findings." Recent CXR revealed, "No active infiltrates. Possible trace left pleural effusion."    Discussed results and recommendations from this evaluation with the patient, RN, and MD on unit. Consult received and chart reviewed. The patient was seen at bedside this afternoon for an assessment of swallow function, at which time she was awake and cooperative. Patient currently receiving supplemental O2 via NC in place. The patient was seen by this department on 1/6/21 (please see full report for details) at [facility] before being transferred to [place], at which time a mechanical soft solid with thin liquid diet was recommended.    Per charting, the patient is an "89F with PMH baseline mild dementia, HTN, hypothyroidism, congenital single kidney, and autoimmune hepatitis, presented with increased confusion and weakness due to possible metabolic encephalopathy due to UTI vs. TIA."    WBC is WFL. MRI of the head revealed, "No acute findings." Recent CXR revealed, "No active infiltrates. Possible trace left pleural effusion."    Discussed results and recommendations from this evaluation with the patient, RN, and MD on unit.

## 2021-01-08 ENCOUNTER — TRANSCRIPTION ENCOUNTER (OUTPATIENT)
Age: 86
End: 2021-01-08

## 2021-01-08 LAB
ANION GAP SERPL CALC-SCNC: 8 MMOL/L — SIGNIFICANT CHANGE UP (ref 5–17)
BUN SERPL-MCNC: 14 MG/DL — SIGNIFICANT CHANGE UP (ref 7–23)
CALCIUM SERPL-MCNC: 8.5 MG/DL — SIGNIFICANT CHANGE UP (ref 8.4–10.5)
CHLORIDE SERPL-SCNC: 105 MMOL/L — SIGNIFICANT CHANGE UP (ref 96–108)
CO2 SERPL-SCNC: 26 MMOL/L — SIGNIFICANT CHANGE UP (ref 22–31)
CREAT SERPL-MCNC: 0.54 MG/DL — SIGNIFICANT CHANGE UP (ref 0.5–1.3)
GLUCOSE SERPL-MCNC: 107 MG/DL — HIGH (ref 70–99)
MAGNESIUM SERPL-MCNC: 1.8 MG/DL — SIGNIFICANT CHANGE UP (ref 1.6–2.6)
POTASSIUM SERPL-MCNC: 3.3 MMOL/L — LOW (ref 3.5–5.3)
POTASSIUM SERPL-SCNC: 3.3 MMOL/L — LOW (ref 3.5–5.3)
SODIUM SERPL-SCNC: 139 MMOL/L — SIGNIFICANT CHANGE UP (ref 135–145)

## 2021-01-08 PROCEDURE — 74230 X-RAY XM SWLNG FUNCJ C+: CPT | Mod: 26

## 2021-01-08 PROCEDURE — 73502 X-RAY EXAM HIP UNI 2-3 VIEWS: CPT | Mod: 26,RT

## 2021-01-08 PROCEDURE — 99232 SBSQ HOSP IP/OBS MODERATE 35: CPT

## 2021-01-08 RX ORDER — CEFPODOXIME PROXETIL 100 MG
1 TABLET ORAL
Qty: 8 | Refills: 0
Start: 2021-01-08 | End: 2021-01-11

## 2021-01-08 RX ADMIN — Medication 81 MILLIGRAM(S): at 13:31

## 2021-01-08 RX ADMIN — Medication 1 TABLET(S): at 05:59

## 2021-01-08 RX ADMIN — Medication 50 MILLIGRAM(S): at 05:59

## 2021-01-08 RX ADMIN — Medication 75 MICROGRAM(S): at 05:59

## 2021-01-08 RX ADMIN — Medication 1 TABLET(S): at 18:06

## 2021-01-08 RX ADMIN — CEFTRIAXONE 100 MILLIGRAM(S): 500 INJECTION, POWDER, FOR SOLUTION INTRAMUSCULAR; INTRAVENOUS at 16:23

## 2021-01-08 RX ADMIN — ENOXAPARIN SODIUM 40 MILLIGRAM(S): 100 INJECTION SUBCUTANEOUS at 13:31

## 2021-01-08 NOTE — DISCHARGE NOTE NURSING/CASE MANAGEMENT/SOCIAL WORK - NSSCNAMETXT_GEN_ALL_CORE
[facility]   Please contact the home care agency at  [phone] if you have not heard from them by 12 noon on the day after your hospital discharge.   Nurse to visit the day after hospital discharge; Rehabilitation Therapists to follow  [facility] [phone]  if you have not heard from them by 12 noon on the day after your hospital discharge.   Nurse to visit the day after hospital discharge; Rehabilitation Therapists to follow

## 2021-01-08 NOTE — DISCHARGE NOTE NURSING/CASE MANAGEMENT/SOCIAL WORK - NSSCTYPOFSERV_GEN_ALL_CORE
CONSTITUTIONAL: Well-appearing; well-nourished; in no apparent distress.   HEAD: Normocephalic; atraumatic.   EYES:  conjunctiva and sclera clear  ENT: normal nose; no rhinorrhea; normal pharynx with no erythema or lesions.   NECK: Supple; non-tender;   CARDIOVASCULAR: Normal S1, S2; no murmurs, rubs, or gallops. tachycardic  RESPIRATORY: Breathing easily; breath sounds clear and equal bilaterally; no wheezes, rhonchi, or rales.  GI: Soft; non-distended; non-tender; no palpable organomegaly.   EXT: No cyanosis or edema; N/V intact. 4cm well circumscribed ulcer w/ granulation tissue at base of R foot, no surrounding erythema edema or discharge. R 2nd toe diffusely edematous actively draining purulent dc that is very malodorous.   SKIN: Normal for age and race; warm; dry; good turgor; no apparent lesions or rash.   NEURO: A & O x 3; face symmetric; grossly unremarkable.   PSYCHOLOGICAL: The patient’s mood and manner are appropriate.
Visiting nurse and Physical Therapy services

## 2021-01-08 NOTE — DISCHARGE NOTE PROVIDER - CARE PROVIDER_API CALL
Tiburcio Godinez)  Infectious Disease; Internal Medicine  [address] [place] [address] [id number]  Phone: [phone]  Fax: [phone]  Follow Up Time:

## 2021-01-08 NOTE — PROGRESS NOTE ADULT - ATTENDING COMMENTS
Infectious Diseases will continue to follow. Please call with any questions.   Marilyn Navarrete M.D.  [facility], Division of Infectious Diseases [phone]  For over the weekend and after hours, please call [phone] Infectious Diseases will continue to follow. Please call with any questions.   Marilyn Navarrete M.D.  [facility], Division of Infectious Diseases [phone]  For over the weekend and after hours, please call [phone]  Dr. Violeta Dickson will be covering the service this weekend. Infectious Diseases will sign off at this time. Please call with any questions.  Marilyn Navarrete M.D.  [facility], Division of Infectious Diseases [phone]  For over the weekend and after hours, please call [phone]  Dr. Violeta Dickson will be covering the service this weekend.

## 2021-01-08 NOTE — DISCHARGE NOTE NURSING/CASE MANAGEMENT/SOCIAL WORK - PATIENT PORTAL LINK FT
You can access the FollowMyHealth Patient Portal offered by [facility] by registering at the following website: [email or web address]. By joining [facility]’s FollowMyHealth portal, you will also be able to view your health information using other applications (apps) compatible with our system.

## 2021-01-08 NOTE — PROGRESS NOTE ADULT - SUBJECTIVE AND OBJECTIVE BOX
[facility], Division of Infectious Diseases  BARBARA Vaughan Y. Patel, S. Shah  [phone]    Name: JOESPH MAYBERRY  Age: 89y  Gender: Female  MRN: [id number]  Note Date: 01-08-21    Interval History:  Patient seen and examined at bedside this AM  No acute overnight events. Afebrile  Sleeping comfortably  Notes reviewed    Antibiotics:  cefTRIAXone   IVPB 1000 milliGRAM(s) IV Intermittent every 24 hours      Medications:  aspirin enteric coated 81 milliGRAM(s) Oral daily  cefTRIAXone   IVPB 1000 milliGRAM(s) IV Intermittent every 24 hours  enoxaparin Injectable 40 milliGRAM(s) SubCutaneous daily  lactobacillus acidophilus 1 Tablet(s) Oral two times a day  levothyroxine 75 MICROGram(s) Oral daily  metoprolol succinate ER 50 milliGRAM(s) Oral daily      Review of Systems:  A 10-point review of systems was obtained.     Pertinent positives and negatives--  Constitutional: No fevers. No Chills. No Rigors.   Cardiovascular: No chest pain. No palpitations.  Respiratory: No shortness of breath. No cough.  Gastrointestinal: No nausea or vomiting. No diarrhea or constipation.   Psychiatric: Pleasant. Appropriate affect.    Review of systems otherwise negative except as previously noted.    Allergies: latex (Rash)  penicillin (Rash)  sulfa drugs (Rash)    For details regarding the patient's past medical history, social history, family history, and other miscellaneous elements, please refer the initial infectious diseases consultation and/or the admitting history and physical examination for this admission.    Objective:  Vitals:   T(C): 36.8 (01-08-21 @ 05:50), Max: 37.2 (01-07-21 @ 18:00)  HR: 71 (01-08-21 @ 05:50) (70 - 86)  BP: 150/63 (01-08-21 @ 05:50) (137/72 - 162/69)  RR: 18 (01-08-21 @ 05:50) (17 - 18)  SpO2: 96% (01-08-21 @ 05:50) (94% - 98%)    Physical Examination:  General: no acute distress  HEENT: NC/AT, EOMI, anicteric, no oral lesions  Neck: supple, no palpable LAD  Cardio: S1, S2 heard, RRR, no murmurs  Resp: breath sounds heard bilaterally, no rales, wheezes or rhonchi  Abd: soft, NT, ND, + bowel sounds  Neuro: no obvious focal deficits  Ext: no edema or cyanosis  Skin: warm, dry, no visible rash      Laboratory Studies:  CBC:                       10.6   8.33  )-----------( 255      ( 07 Jan 2021 07:43 )             32.7     CMP: 01-08    139  |  105  |  14  ----------------------------<  107<H>  3.3<L>   |  26  |  0.54    Ca    8.5      08 Jan 2021 07:44  Mg     1.8     01-08          Microbiology: reviewed    Culture - Urine (collected 01-05-21 @ 18:54)  Source: .Urine Clean Catch (Midstream)  Final Report (01-06-21 @ 15:27):    <10,000 CFU/mL Normal Urogenital Modesta    Culture - Blood (collected 01-05-21 @ 18:51)  Source: .Blood Blood-Peripheral  Preliminary Report (01-06-21 @ 19:01):    No growth to date.    Culture - Blood (collected 01-05-21 @ 18:51)  Source: .Blood Blood-Peripheral  Preliminary Report (01-06-21 @ 19:01):    No growth to date.        Radiology: reviewed       [facility], Division of Infectious Diseases  BARBARA Vaughan Y. Patel, S. Shah  [phone]    Name: JOESPH MAYBERRY  Age: 89y  Gender: Female  MRN: [id number]  Note Date: 01-08-21    Interval History:  Patient seen and examined at bedside this AM  No acute overnight events. Afebrile  Sleeping comfortably  Notes reviewed    Antibiotics:  cefTRIAXone   IVPB 1000 milliGRAM(s) IV Intermittent every 24 hours      Medications:  aspirin enteric coated 81 milliGRAM(s) Oral daily  cefTRIAXone   IVPB 1000 milliGRAM(s) IV Intermittent every 24 hours  enoxaparin Injectable 40 milliGRAM(s) SubCutaneous daily  lactobacillus acidophilus 1 Tablet(s) Oral two times a day  levothyroxine 75 MICROGram(s) Oral daily  metoprolol succinate ER 50 milliGRAM(s) Oral daily      Review of Systems:  A 10-point review of systems was obtained.     Pertinent positives and negatives--  Constitutional: No fevers. No Chills. No Rigors.   Cardiovascular: No chest pain. No palpitations.  Respiratory: No shortness of breath. No cough.  Gastrointestinal: No nausea or vomiting. No diarrhea or constipation.   Psychiatric: Pleasant. Appropriate affect.    Review of systems otherwise negative except as previously noted.    Allergies: latex (Rash)  penicillin (Rash)  sulfa drugs (Rash)    For details regarding the patient's past medical history, social history, family history, and other miscellaneous elements, please refer the initial infectious diseases consultation and/or the admitting history and physical examination for this admission.    Objective:  Vitals:   T(C): 36.8 (01-08-21 @ 05:50), Max: 37.2 (01-07-21 @ 18:00)  HR: 71 (01-08-21 @ 05:50) (70 - 86)  BP: 150/63 (01-08-21 @ 05:50) (137/72 - 162/69)  RR: 18 (01-08-21 @ 05:50) (17 - 18)  SpO2: 96% (01-08-21 @ 05:50) (94% - 98%)    Physical Examination:  General: no acute distress  HEENT: NC/AT, EOMI, anicteric, no oral lesions  Neck: supple, no palpable LAD  Cardio: S1, S2 heard, RRR, no murmurs  Resp: breath sounds heard bilaterally, no rales, wheezes or rhonchi  Abd: soft, NT, ND, + bowel sounds  Neuro: no obvious focal deficits  Ext: no edema or cyanosis  Skin: warm, dry, no visible rash      Laboratory Studies:  CBC:                       10.6   8.33  )-----------( 255      ( 07 Jan 2021 07:43 )             32.7     CMP: 01-08    139  |  105  |  14  ----------------------------<  107<H>  3.3<L>   |  26  |  0.54    Ca    8.5      08 Jan 2021 07:44  Mg     1.8     01-08          Microbiology: reviewed    Culture - Urine (collected 01-05-21 @ 18:54)  Source: .Urine Clean Catch (Midstream)  Final Report (01-06-21 @ 15:27):    <10,000 CFU/mL Normal Urogenital Modesta    Culture - Blood (collected 01-05-21 @ 18:51)  Source: .Blood Blood-Peripheral  Preliminary Report (01-06-21 @ 19:01):    No growth to date.    Culture - Blood (collected 01-05-21 @ 18:51)  Source: .Blood Blood-Peripheral  Preliminary Report (01-06-21 @ 19:01):    No growth to date.        Radiology: reviewed  < from: Xray Hip w/ Pelvis 2 or 3 Views, Right (01.08.21 @ 09:40) >    EXAM:  XR HIP WITH PELV 2-3V RT                                  PROCEDURE DATE:  01/08/2021          INTERPRETATION:  CLINICAL STATEMENT: Pain.    TECHNIQUE: AP and lateral views of right hip.    COMPARISON: None.    FINDINGS:  There is no acute fracture or dislocation. Left total hip placement gross anatomic alignment    The hip joint is intact.    IMPRESSION:  No radiographic evidence of acute fracture or dislocation. If symptoms persist, consider CT or MRI exam to exclude occult fracture.              ANGELA BAIG MD; Attending Radiologist  This document has been electronically signed. Jan 8 2021  9:53AM    < end of copied text >

## 2021-01-08 NOTE — SWALLOW VFSS/MBS ASSESSMENT ADULT - DIAGNOSTIC IMPRESSIONS
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 1. The patient demonstrated functional oral management of puree and honey thick liquids via teaspoon marked by adequate oral acceptance and stripping from the utensil with subsequent adequate bolus collection and AP transit time. Trace lingual and palatal residue noted subsequent to deglutition which reduced with a secondary swallow.  2. The patient demonstrated a mild-moderate oral dysphagia for solids marked by adequate oral acceptance with prolonged oral manipulation and increased mastication time resulting in delayed AP transit time. Mild lingual and palatal residue noted subsequent to deglutition reduced with utilization of a liquid wash.  3. The patient demonstrated a moderate oral dysphagia for nectar thick liquids marked by adequate oral acceptance with delayed bolus collection and transfer with uncontrolled spillover to the hypopharynx.   4. The patient demonstrated a mild pharyngeal dysphagia for puree and honey thick liquids via teaspoon presentations marked by a delayed pharyngeal swallow trigger with reduced base of tongue retraction, reduced epiglottic deflection, reduced hyolaryngeal elevation, and reduced pharyngeal contractility resulting in trace stasis along the base of tongue, mild-moderate stasis in the vallecula, and trace stasis along the posterior pharyngeal wall and in the pyriforms which reduced with a subsequent swallow. No laryngeal penetration/aspiration observed. Incomplete closure of the laryngeal vestibule was observed on cup sips of honey thick liquids resulting in laryngeal penetration with and without full retrieval.  5. The patient demonstrated a moderate-severe pharyngeal dysphagia for solid and nectar thick liquid textures marked by a delayed pharyngeal swallow trigger with reduced base of tongue retraction, reduced epiglottic deflection, reduced hyolaryngeal elevation, reduced pharyngeal contractility, and incomplete closure of the laryngeal vestibule resulting in silent laryngeal penetration without retrieval.

## 2021-01-08 NOTE — PROGRESS NOTE ADULT - SUBJECTIVE AND OBJECTIVE BOX
Neurology follow up note    JOESPH [id number]      Interval History:    Patient feels ok no new complaints sitting in chair     MEDICATIONS    aspirin enteric coated 81 milliGRAM(s) Oral daily  cefTRIAXone   IVPB 1000 milliGRAM(s) IV Intermittent every 24 hours  enoxaparin Injectable 40 milliGRAM(s) SubCutaneous daily  lactobacillus acidophilus 1 Tablet(s) Oral two times a day  levothyroxine 75 MICROGram(s) Oral daily  metoprolol succinate ER 50 milliGRAM(s) Oral daily      Allergies    latex (Rash)  penicillin (Rash)  sulfa drugs (Rash)    Intolerances        Height (cm): 157.5 (01-07 @ 14:44)  Weight (kg): 59.2 (01-07 @ 14:44)  BMI (kg/m2): 23.9 (01-07 @ 14:44)    Vital Signs Last 24 Hrs  T(C): 36.8 (08 Jan 2021 05:50), Max: 37.2 (07 Jan 2021 18:00)  T(F): 98.2 (08 Jan 2021 05:50), Max: 98.9 (07 Jan 2021 18:00)  HR: 71 (08 Jan 2021 05:50) (71 - 86)  BP: 150/63 (08 Jan 2021 05:50) (137/72 - 159/57)  BP(mean): --  RR: 18 (08 Jan 2021 05:50) (17 - 18)  SpO2: 96% (08 Jan 2021 05:50) (94% - 97%)         REVIEW OF SYSTEMS:  Constitutional:  The patient denies fever, chills, or night sweats.  Head:  No headaches.  Eyes:  No double vision or blurry vision.  Ears:  No ringing in the ears.  Neck:  No neck pain.  Respiratory:  No shortness of breath.  Cardiovascular:  No chest pain.  Abdomen:  No nausea, vomiting, or abdominal pain.  Extremities/Neurological:  No numbness or tingling.  Musculoskeletal:  Positive history of joint pain, also foot pain.    PHYSICAL EXAMINATION: HEENT:  Head:  Normocephalic, atraumatic.  Eyes:  No scleral icterus.  Ears:  Hearing bilaterally intact.  NECK:  Supple.  RESPIRATORY:  Decreased breath sounds bilaterally, but gives poor effort.  CARDIOVASCULAR:  S1 and S2 heard.  ABDOMEN:  Soft and nontender.  EXTREMITIES:  No clubbing or cyanosis were noted.      NEUROLOGIC:  The patient is awake and alert.   [facility] able to tell daughter name able to name objects  Recall was 0/3, was able to name simple objects.  Extraocular movements were intact.  Pupils were equal, round, and reactive bilaterally 3 mm to 2 mm.  Speech was fluent.  Smile was symmetric.  Motor:  Bilateral upper were 4/5, right lower were 3/5. left 3-/5 h/o of surgery   Sensory:  Bilateral upper and lower intact to light touch.                 LABS:  CBC Full  -  ( 07 Jan 2021 07:43 )  WBC Count : 8.33 K/uL  RBC Count : 3.64 M/uL  Hemoglobin : 10.6 g/dL  Hematocrit : 32.7 %  Platelet Count - Automated : 255 K/uL  Mean Cell Volume : 89.8 fl  Mean Cell Hemoglobin : 29.1 pg  Mean Cell Hemoglobin Concentration : 32.4 gm/dL  Auto Neutrophil # : x  Auto Lymphocyte # : x  Auto Monocyte # : x  Auto Eosinophil # : x  Auto Basophil # : x  Auto Neutrophil % : x  Auto Lymphocyte % : x  Auto Monocyte % : x  Auto Eosinophil % : x  Auto Basophil % : x      01-08    139  |  105  |  14  ----------------------------<  107<H>  3.3<L>   |  26  |  0.54    Ca    8.5      08 Jan 2021 07:44  Mg     1.8     01-08      Hemoglobin A1C:       Vitamin B12         RADIOLOGY  ANALYSIS AND PLAN:  An 89-year-old with a history of altered mental status and ataxia.  For episode of altered mental status, suspect most likely metabolic encephalopathy, possibly secondary to underlying viral type syndrome, questionable any side effects of Macrobid, also possibly secondary to progressive mild cognitive impairment versus subtle dementia.  MRI is negative for cerebrovascular accident.  Infection workup as needed.  For mild cognitive impairment, supportive therapy.  For history of ataxia secondary to age-related changes, deconditioning.  For hypothyroidism, continue the patient on Synthroid.  For hypertension, continue the patient on her blood pressure medication.  today dose appear more interactive vs yesterday 1/8/2021  physical Therapy  Patient has poor ambulating status mostly sedentary   The daughter's name is Amina, her telephone number is [phone].  Her cell number is [phone] 1/8/2021    Greater than 45 minutes of time was spent with the patient, plan of care, reviewing data, speaking to the family and  50% of the visit was spent counseling and/or coordinating care with multidisciplinary healthcare team

## 2021-01-08 NOTE — SWALLOW VFSS/MBS ASSESSMENT ADULT - RECOMMENDED FEEDING/EATING TECHNIQUES
allow for swallow between intakes/alternate food with liquid/check mouth frequently for oral residue/pocketing/crush medication (when feasible)/maintain upright posture during/after eating for 30 mins/no straws/oral hygiene/position upright (90 degrees)/provide rest periods between swallows/small sips/bites teaspoon presentations only!/allow for swallow between intakes/alternate food with liquid/check mouth frequently for oral residue/pocketing/crush medication (when feasible)/maintain upright posture during/after eating for 30 mins/no straws/oral hygiene/position upright (90 degrees)/provide rest periods between swallows/small sips/bites

## 2021-01-08 NOTE — DISCHARGE NOTE PROVIDER - NSDCCPCAREPLAN_GEN_ALL_CORE_FT
PRINCIPAL DISCHARGE DIAGNOSIS  Diagnosis: UTI (urinary tract infection)  Assessment and Plan of Treatment: complete antibiotics.

## 2021-01-08 NOTE — SWALLOW VFSS/MBS ASSESSMENT ADULT - ORAL PHASE
within functional limits Delayed oral transit time/Reduced anterior - posterior transport/Uncontrolled bolus / spillover in hypopharynx Delayed oral transit time/Reduced anterior - posterior transport/Residue in oral cavity

## 2021-01-08 NOTE — SWALLOW VFSS/MBS ASSESSMENT ADULT - COMMENTS
The patient was received in the radiology suite this AM for a Modified Barium Swallow Study, at which time she was awake and cooperative. Patient currently receiving supplemental O2 via 3L NC in place. She denied any pain pre/post today's evaluation.    The patient was last seen by this department on 1/7/20 (please see full report for details), at which time a dysphagia 1 with honey thick liquid diet and a Modified Barium Swallow Study were recommended.    Per charting, the patient is an "89F with PMH baseline mild dementia, HTN, hypothyroidism, congenital single kidney, and autoimmune hepatitis, presented with increased confusion and weakness due to possible metabolic encephalopathy due to UTI vs. TIA."    WBC is WFL. MRI of the head revealed, "No acute findings." Recent CXR revealed, "No active infiltrates. Possible trace left pleural effusion."    Discussed results and recommendations from this evaluation with the patient and call out to MD.

## 2021-01-08 NOTE — SWALLOW VFSS/MBS ASSESSMENT ADULT - DEMONSTRATES NEED FOR REFERRAL TO ANOTHER SERVICE
Consider a nutritional consult to ensure adequate daily caloric intake given current dysphagia state./Registered Dietitian Consider a nutritional consult to ensure adequate daily caloric intake given current dysphagia state; Consider an ENT/GI consult given incidental finding of a CP bar at level C4-C5 per Radiologist's report./Registered Dietitian

## 2021-01-08 NOTE — SWALLOW VFSS/MBS ASSESSMENT ADULT - ROSENBEK'S PENETRATION ASPIRATION SCALE
(3) contrast remains above the vocal cords, visible residue remains (penetration) (1) no aspiration, contrast does not enter airway 2, 3/(3) contrast remains above the vocal cords, visible residue remains (penetration) 3, 5

## 2021-01-08 NOTE — SWALLOW VFSS/MBS ASSESSMENT ADULT - UNSUCCESSFUL STRATEGIES TRIALED DURING PROCEDURE
productive volitional cough following clinician cue chin tuck/productive volitional cough following clinician cue

## 2021-01-08 NOTE — PROGRESS NOTE ADULT - ASSESSMENT
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   Was recently dx w/ UTI as outpatient, unable to take medications.   Recent admission for L hip fracture s/p L manas hip arthroplasty on 11/16/20.     Acute cystitis  Fevers-resolved  Leukocytosis-resolved  -UA mildly positive--likely in the setting of already being started on Abx  -Ucx/BCx NGTD  -imaging reviewed, renal sono w/o evidence of upper urinary tract disease  Was able to review outpt cx; pansensitive E.coli  -fever continues to remain low grade, likely 2/2 acute infection and in line w/ course of disease  -c/w ceftriaxone 1gm q24h while inpatient  When patient is ready for discharge can switch to PO cefpodoxime 100mg BID to complete total 7 day course until 1/12/2020    R hip pain  -pending XR  -extremely low suspicion for occult infection as pt has clinically improved; BCx are negative and pt w/o persistent signs/sx of sepsis    Lethargy/AMS  -lethargy/AMS likely 2/2 acute infection  -acute CVA r/o per imaging as above  -maintain aspiration precautions    Penicillin Allergy  -rash to penicillin noted, however low cross reactivity w/ cephalosporins  Pt tolerating ceftriaxone well, ok to use in future     Pt is a 89W w/ PMHx of mild dementia per daughter, HTN, hypothyroidism, congenital unilateral kidney, and autoimmune hepatitis, BIBEMS  for fever, AMS and weakness.   Was recently dx w/ UTI as outpatient, unable to take medications.   Recent admission for L hip fracture s/p L manas hip arthroplasty on 11/16/20.     Acute cystitis  Fevers-resolved  Leukocytosis-resolved  -UA mildly positive--likely in the setting of already being started on Abx  -Ucx/BCx NGTD  -imaging reviewed, renal sono w/o evidence of upper urinary tract disease  Was able to review outpt cx; pansensitive E.coli  -fever continues to remain low grade, likely 2/2 acute infection and in line w/ course of disease  -c/w ceftriaxone 1gm q24h while inpatient  When patient is ready for discharge can switch to PO cefpodoxime 100mg BID to complete total 7 day course until 1/12/2020    R hip pain  -XR w/o obvious OM  -extremely low suspicion for occult infection as pt has clinically improved; BCx are negative and pt w/o persistent signs/sx of sepsis    Lethargy/AMS  -lethargy/AMS likely 2/2 acute infection  -acute CVA r/o per imaging as above  -maintain aspiration precautions    Penicillin Allergy  -rash to penicillin noted, however low cross reactivity w/ cephalosporins  Pt tolerating ceftriaxone well, ok to use in future

## 2021-01-08 NOTE — DISCHARGE NOTE PROVIDER - HOSPITAL COURSE
89F with PMH baseline dementia, HTN, hypothyroidism, congenital single kidney, and autoimmune hepatitis, Daughter called 911 on 1/5/21 and brought to [facility] from home for fever, increased AMS, weakness, inability to ambulate per daughter. Patient is unable to provide further history. Daughter noted patient had malodorous and dark urine since returning home from [facility] at [place] on 12/22/20. This prompted a televisit with PCP Dr. Godinez who performed UA and Ucx which was dropped off at the lab on 12/31 found urinary WBC and E. coli (copy of results received. and placed in paper chart)   Patient was started on Macrobid 100mg BID, only received 1 dose 1/4  and was not able to take 2nd dose AM 1/5 due to increase in confusion and inability to walk. Daughter was concerned that patient was having a stroked and called EMS. Upon EMS arrival, patient was found to be febrile with Tmax 100.4.   Of note, patient was most recently admitted to [facility] in 11/2020 with L hip fracture s/p fall. She is now s/p Left manas hip arthroplasty on 11/16/20 after which patient was started on Xarelto for total of 35 days. She was discharged to [facility] on 11/23/20.  In [place] patient was evaluated by neurology - CT extensive chronic ischemic changes with volume loss. No CT evidence of an acute infarct or hemorrhage.  MRI brain did not show infarct.    [facility] and [facility]  Also seen by ID - UA and Urine cx may be negative due to partially treated UTI.  Recommended Rocephin 1gm IV daily and monitor for now.       Patient transferred to [place] due to bed availability.     Neuro - workup completed.  TIA/CVA ruled out.  increased confusion likely metabolic encephalopathy due to UTI.      ID - followed by Dr Navarrete.  Received IV rocephin with no reaction.  To complete PO abx at home.     Message left with Dr Godinez's office.  No call back received.      89F with PMH baseline dementia, HTN, hypothyroidism, congenital single kidney, and autoimmune hepatitis, Daughter called 911 on 1/5/21 and brought to [facility] from home for fever, increased AMS, weakness, inability to ambulate per daughter. Patient is unable to provide further history. Daughter noted patient had malodorous and dark urine since returning home from [facility] at [place] on 12/22/20. This prompted a televisit with PCP Dr. Godinez who performed UA and Ucx which was dropped off at the lab on 12/31 found urinary WBC and E. coli (copy of results received. and placed in paper chart)   Patient was started on Macrobid 100mg BID, only received 1 dose 1/4  and was not able to take 2nd dose AM 1/5 due to increase in confusion and inability to walk. Daughter was concerned that patient was having a stroked and called EMS. Upon EMS arrival, patient was found to be febrile with Tmax 100.4.   Of note, patient was most recently admitted to [facility] in 11/2020 with L hip fracture s/p fall. She is now s/p Left manas hip arthroplasty on 11/16/20 after which patient was started on Xarelto for total of 35 days. She was discharged to [facility] on 11/23/20.  In [place] patient was evaluated by neurology - CT extensive chronic ischemic changes with volume loss. No CT evidence of an acute infarct or hemorrhage.  MRI brain did not show infarct.    Speech and swallow eval recommended [facility] soft diet.  Also seen by ID - UA and Urine cx may be negative due to partially treated UTI.  Recommended Rocephin 1gm IV daily and monitor for now.       Patient transferred to [place] due to bed availability.     Neuro - workup completed.  TIA/CVA ruled out.  increased confusion likely metabolic encephalopathy due to UTI.      ID - followed by Dr Navarrete.  Received IV rocephin with no reaction.  To complete PO abx at home. She was discharged on Cefpodoxime 100mg BID to be completed on 1/12/21    Message left with Dr Godinez's office.  No call back received.   Updated daughter about discharge plans.

## 2021-01-08 NOTE — PROGRESS NOTE ADULT - SUBJECTIVE AND OBJECTIVE BOX
Patient is a 89y old  Female who presents with a chief complaint of AMS (08 Jan 2021 09:20)    INTERVAL HPI/OVERNIGHT EVENTS: awake and alert, pleasantly confused.  no complaints.     MEDICATIONS  (STANDING):  aspirin enteric coated 81 milliGRAM(s) Oral daily  cefTRIAXone   IVPB 1000 milliGRAM(s) IV Intermittent every 24 hours  enoxaparin Injectable 40 milliGRAM(s) SubCutaneous daily  lactobacillus acidophilus 1 Tablet(s) Oral two times a day  levothyroxine 75 MICROGram(s) Oral daily  metoprolol succinate ER 50 milliGRAM(s) Oral daily    MEDICATIONS  (PRN):      Allergies  latex (Rash)  penicillin (Rash)  sulfa drugs (Rash)      REVIEW OF SYSTEMS:  unable due to dementia    Vital Signs Last 24 Hrs  T(C): 36.8 (08 Jan 2021 14:18), Max: 37.2 (07 Jan 2021 18:00)  T(F): 98.2 (08 Jan 2021 14:18), Max: 98.9 (07 Jan 2021 18:00)  HR: 78 (08 Jan 2021 14:18) (69 - 84)  BP: 149/69 (08 Jan 2021 14:18) (133/63 - 159/57)  BP(mean): --  RR: 18 (08 Jan 2021 14:18) (17 - 18)  SpO2: 93% (08 Jan 2021 14:18) (93% - 97%)    PHYSICAL EXAM:  GENERAL: NAD, well-groomed, well-developed  HEAD:  Atraumatic, Normocephalic  EYES: conjunctiva and sclera clear  ENMT: Moist mucous membranes  NECK: Supple, No JVD  NERVOUS SYSTEM:  Awake and alert, moving all extremities  CHEST/LUNG: Clear to auscultation bilaterally;  HEART: Regular rate and rhythm;   ABDOMEN: Soft, Nontender, Nondistended; Bowel sounds present  EXTREMITIES:  2+ Peripheral Pulses, No clubbing, cyanosis, or edema      LABS:    08 Jan 2021 07:44    139    |  105    |  14     ----------------------------<  107    3.3     |  26     |  0.54     Ca    8.5        08 Jan 2021 07:44  Mg     1.8       08 Jan 2021 07:44          CAPILLARY BLOOD GLUCOSE          RADIOLOGY & ADDITIONAL TESTS:    Imaging Personally Reviewed:  [ ] YES     Consultant(s) Notes Reviewed:      Care Discussed with Consultants/Other Providers:  dr Navarrete, Dr Weber    Advanced Directives: [ ] DNR  [ ] No feeding tube  [ ] MOLST in chart  [ ] MOLST completed today  [ ] Unknown

## 2021-01-08 NOTE — SWALLOW VFSS/MBS ASSESSMENT ADULT - RECOMMENDED CONSISTENCY
DIAGNOSTIC IMPRESSIONS CONT: Silent laryngeal penetration was observed before and during the swallow for nectar thick liquids. Absent patient response indicative of reduced laryngo-pharyngeal sensation. Compensatory strategies were unsuccessful given patient's difficulty following multi-step directives.  6. 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 In addition, trace retention of barium was visualized in the cervical esophagus above the level of the CP bar for all PO trials.    RECOMMENDATIONS:  1. Dysphagia 1 with honey thick liquids via teaspoon presentations only!, as tolerated  2. Patient requires full 1:1 assistance during all meals.  3. Ensure upright positioning during and 30 min post all PO intake  4. Puree and honey thick liquids must be presented via TEASPOON PRESENTATION only!  5. Slow pacing during all meals  6. Maintain strict aspiration precautions  7. Maintain strict oral care

## 2021-01-08 NOTE — SWALLOW VFSS/MBS ASSESSMENT ADULT - ADDITIONAL RECOMMENDATIONS
This department to continue to follow during this admission, as schedule permits. Continued swallowing therapy is recommended upon discharge from [facility] (rehab vs home care vs outpatient clinic).

## 2021-01-08 NOTE — DISCHARGE NOTE PROVIDER - NSDCMRMEDTOKEN_GEN_ALL_CORE_FT
Acidophilus oral capsule: 1 cap(s) orally once a day  aspirin 81 mg oral tablet: 1 tab(s) orally once a day  cefpodoxime 100 mg oral tablet: 1 tab(s) orally every 12 hours   metoprolol succinate 50 mg oral tablet, extended release: 1 tab(s) orally once a day  Synthroid 75 mcg (0.075 mg) oral tablet: 1 tab(s) orally once a day  thick-it: 1 dose(s) orally 4 times a day   Vitamin D3: 1 tab(s) orally once a day

## 2021-01-09 VITALS
DIASTOLIC BLOOD PRESSURE: 53 MMHG | TEMPERATURE: 98 F | SYSTOLIC BLOOD PRESSURE: 112 MMHG | HEART RATE: 70 BPM | OXYGEN SATURATION: 97 % | RESPIRATION RATE: 18 BRPM

## 2021-01-09 LAB
ANION GAP SERPL CALC-SCNC: 7 MMOL/L — SIGNIFICANT CHANGE UP (ref 5–17)
BASOPHILS # BLD AUTO: 0.03 K/UL — SIGNIFICANT CHANGE UP (ref 0–0.2)
BASOPHILS NFR BLD AUTO: 0.3 % — SIGNIFICANT CHANGE UP (ref 0–2)
BUN SERPL-MCNC: 13 MG/DL — SIGNIFICANT CHANGE UP (ref 7–23)
CALCIUM SERPL-MCNC: 8.7 MG/DL — SIGNIFICANT CHANGE UP (ref 8.4–10.5)
CHLORIDE SERPL-SCNC: 103 MMOL/L — SIGNIFICANT CHANGE UP (ref 96–108)
CO2 SERPL-SCNC: 28 MMOL/L — SIGNIFICANT CHANGE UP (ref 22–31)
CREAT SERPL-MCNC: 0.56 MG/DL — SIGNIFICANT CHANGE UP (ref 0.5–1.3)
EOSINOPHIL # BLD AUTO: 0.52 K/UL — HIGH (ref 0–0.5)
EOSINOPHIL NFR BLD AUTO: 4.9 % — SIGNIFICANT CHANGE UP (ref 0–6)
GLUCOSE SERPL-MCNC: 108 MG/DL — HIGH (ref 70–99)
HCT VFR BLD CALC: 33.5 % — LOW (ref 34.5–45)
HGB BLD-MCNC: 11 G/DL — LOW (ref 11.5–15.5)
IMM GRANULOCYTES NFR BLD AUTO: 0.4 % — SIGNIFICANT CHANGE UP (ref 0–1.5)
LYMPHOCYTES # BLD AUTO: 1.84 K/UL — SIGNIFICANT CHANGE UP (ref 1–3.3)
LYMPHOCYTES # BLD AUTO: 17.5 % — SIGNIFICANT CHANGE UP (ref 13–44)
MAGNESIUM SERPL-MCNC: 2.8 MG/DL — HIGH (ref 1.6–2.6)
MCHC RBC-ENTMCNC: 29.3 PG — SIGNIFICANT CHANGE UP (ref 27–34)
MCHC RBC-ENTMCNC: 32.8 GM/DL — SIGNIFICANT CHANGE UP (ref 32–36)
MCV RBC AUTO: 89.3 FL — SIGNIFICANT CHANGE UP (ref 80–100)
MONOCYTES # BLD AUTO: 0.76 K/UL — SIGNIFICANT CHANGE UP (ref 0–0.9)
MONOCYTES NFR BLD AUTO: 7.2 % — SIGNIFICANT CHANGE UP (ref 2–14)
NEUTROPHILS # BLD AUTO: 7.34 K/UL — SIGNIFICANT CHANGE UP (ref 1.8–7.4)
NEUTROPHILS NFR BLD AUTO: 69.7 % — SIGNIFICANT CHANGE UP (ref 43–77)
NRBC # BLD: 0 /100 WBCS — SIGNIFICANT CHANGE UP (ref 0–0)
PLATELET # BLD AUTO: 270 K/UL — SIGNIFICANT CHANGE UP (ref 150–400)
POTASSIUM SERPL-MCNC: 3.5 MMOL/L — SIGNIFICANT CHANGE UP (ref 3.5–5.3)
POTASSIUM SERPL-SCNC: 3.5 MMOL/L — SIGNIFICANT CHANGE UP (ref 3.5–5.3)
RBC # BLD: 3.75 M/UL — LOW (ref 3.8–5.2)
RBC # FLD: 14 % — SIGNIFICANT CHANGE UP (ref 10.3–14.5)
SODIUM SERPL-SCNC: 138 MMOL/L — SIGNIFICANT CHANGE UP (ref 135–145)
WBC # BLD: 10.53 K/UL — HIGH (ref 3.8–10.5)
WBC # FLD AUTO: 10.53 K/UL — HIGH (ref 3.8–10.5)

## 2021-01-09 PROCEDURE — 99239 HOSP IP/OBS DSCHRG MGMT >30: CPT

## 2021-01-09 RX ORDER — POTASSIUM CHLORIDE 20 MEQ
10 PACKET (EA) ORAL
Refills: 0 | Status: COMPLETED | OUTPATIENT
Start: 2021-01-09 | End: 2021-01-09

## 2021-01-09 RX ORDER — MAGNESIUM SULFATE 500 MG/ML
2 VIAL (ML) INJECTION ONCE
Refills: 0 | Status: COMPLETED | OUTPATIENT
Start: 2021-01-09 | End: 2021-01-09

## 2021-01-09 RX ADMIN — Medication 50 GRAM(S): at 04:18

## 2021-01-09 RX ADMIN — Medication 100 MILLIEQUIVALENT(S): at 08:21

## 2021-01-09 RX ADMIN — ENOXAPARIN SODIUM 40 MILLIGRAM(S): 100 INJECTION SUBCUTANEOUS at 11:25

## 2021-01-09 RX ADMIN — CEFTRIAXONE 100 MILLIGRAM(S): 500 INJECTION, POWDER, FOR SOLUTION INTRAMUSCULAR; INTRAVENOUS at 12:04

## 2021-01-09 RX ADMIN — Medication 100 MILLIEQUIVALENT(S): at 04:19

## 2021-01-09 RX ADMIN — Medication 81 MILLIGRAM(S): at 11:25

## 2021-01-09 RX ADMIN — Medication 100 MILLIEQUIVALENT(S): at 03:18

## 2021-01-09 RX ADMIN — Medication 50 MILLIGRAM(S): at 06:27

## 2021-01-09 RX ADMIN — Medication 1 TABLET(S): at 06:27

## 2021-01-09 RX ADMIN — Medication 75 MICROGRAM(S): at 06:27

## 2021-01-09 NOTE — PROGRESS NOTE ADULT - SUBJECTIVE AND OBJECTIVE BOX
Neurology follow up note    JOESPH [id number]      Interval History:    Patient feels ok no new complaints.    MEDICATIONS    aspirin enteric coated 81 milliGRAM(s) Oral daily  cefTRIAXone   IVPB 1000 milliGRAM(s) IV Intermittent every 24 hours  enoxaparin Injectable 40 milliGRAM(s) SubCutaneous daily  lactobacillus acidophilus 1 Tablet(s) Oral two times a day  levothyroxine 75 MICROGram(s) Oral daily  metoprolol succinate ER 50 milliGRAM(s) Oral daily      Allergies    latex (Rash)  penicillin (Rash)  sulfa drugs (Rash)    Intolerances        Height (cm): 157.5 (01-09 @ 07:18)  Weight (kg): 59.2 (01-09 @ 07:18)  BMI (kg/m2): 23.9 (01-09 @ 07:18)    Vital Signs Last 24 Hrs  T(C): 37.1 (09 Jan 2021 10:00), Max: 37.1 (09 Jan 2021 10:00)  T(F): 98.7 (09 Jan 2021 10:00), Max: 98.7 (09 Jan 2021 10:00)  HR: 66 (09 Jan 2021 10:00) (66 - 83)  BP: 110/53 (09 Jan 2021 10:00) (110/53 - 149/69)  BP(mean): --  RR: 17 (09 Jan 2021 10:00) (17 - 19)  SpO2: 97% (09 Jan 2021 10:00) (93% - 99%)         REVIEW OF SYSTEMS:  Constitutional:  The patient denies fever, chills, or night sweats.  Head:  No headaches.  Eyes:  No double vision or blurry vision.  Ears:  No ringing in the ears.  Neck:  No neck pain.  Respiratory:  No shortness of breath.  Cardiovascular:  No chest pain.  Abdomen:  No nausea, vomiting, or abdominal pain.  Extremities/Neurological:  No numbness or tingling.  Musculoskeletal:  Positive history of joint pain, also foot pain.    PHYSICAL EXAMINATION: HEENT:  Head:  Normocephalic, atraumatic.  Eyes:  No scleral icterus.  Ears:  Hearing bilaterally intact.  NECK:  Supple.  RESPIRATORY:  Decreased breath sounds bilaterally, but gives poor effort.  CARDIOVASCULAR:  S1 and S2 heard.  ABDOMEN:  Soft and nontender.  EXTREMITIES:  No clubbing or cyanosis were noted.      NEUROLOGIC:  The patient is awake and alert.   [facility] able to tell daughter name able to name objects  Recall was 0/3, was able to name simple objects.  Extraocular movements were intact.  Pupils were equal, round, and reactive bilaterally 3 mm to 2 mm.  Speech was fluent.  Smile was symmetric.  Motor:  Bilateral upper were 4/5, right lower were 3/5. left 3-/5 h/o of surgery   Sensory:  Bilateral upper and lower intact to light touch.                    LABS:  CBC Full  -  ( 09 Jan 2021 07:04 )  WBC Count : 10.53 K/uL  RBC Count : 3.75 M/uL  Hemoglobin : 11.0 g/dL  Hematocrit : 33.5 %  Platelet Count - Automated : 270 K/uL  Mean Cell Volume : 89.3 fl  Mean Cell Hemoglobin : 29.3 pg  Mean Cell Hemoglobin Concentration : 32.8 gm/dL  Auto Neutrophil # : 7.34 K/uL  Auto Lymphocyte # : 1.84 K/uL  Auto Monocyte # : 0.76 K/uL  Auto Eosinophil # : 0.52 K/uL  Auto Basophil # : 0.03 K/uL  Auto Neutrophil % : 69.7 %  Auto Lymphocyte % : 17.5 %  Auto Monocyte % : 7.2 %  Auto Eosinophil % : 4.9 %  Auto Basophil % : 0.3 %      01-09    138  |  103  |  13  ----------------------------<  108<H>  3.5   |  28  |  0.56    Ca    8.7      09 Jan 2021 07:04  Mg     2.8     01-09      Hemoglobin A1C:       Vitamin B12         RADIOLOGY      ANALYSIS AND PLAN:  An 89-year-old with a history of altered mental status and ataxia.  For episode of altered mental status, suspect most likely metabolic encephalopathy, possibly secondary to underlying viral type syndrome, questionable any side effects of Macrobid, also possibly secondary to progressive mild cognitive impairment versus subtle dementia.  MRI is negative for cerebrovascular accident.  Infection workup as needed.  For mild cognitive impairment, supportive therapy.  For history of ataxia secondary to age-related changes, deconditioning.  For hypothyroidism, continue the patient on Synthroid.  For hypertension, continue the patient on her blood pressure medication.  today dose appear more interactive vs yesterday 1/8/2021  physical Therapy  Patient has poor ambulating status mostly sedentary   The daughter's name is Amina, her telephone number is [phone].  Her cell number is [phone] 1/8/2021    Greater than 45 minutes of time was spent with the patient, plan of care, reviewing data, speaking to the family and  50% of the visit was spent counseling and/or coordinating care with multidisciplinary healthcare team

## 2021-01-09 NOTE — PROGRESS NOTE ADULT - PROBLEM SELECTOR PLAN 1
poss TIA,  CVA work up started at [facility].  Seen by Neuro, Swallow eval, started on Aspirin.  PT eval ordered.  will monitor on tele x 24 hours and follow up with Neuro.
likely metabolic encephalopathy due to UTI.  patient returning to baseline.
likely metabolic encephalopathy due to UTI.    mental status stable.   -MRI reviewed from Jan 6, 2021 and shows No acute findings

## 2021-01-09 NOTE — PROGRESS NOTE ADULT - PROVIDER SPECIALTY LIST ADULT
Neurology
Hospitalist
Hospitalist
Infectious Disease
Infectious Disease
Hospitalist

## 2021-01-09 NOTE — PROGRESS NOTE ADULT - PROBLEM SELECTOR PLAN 2
Culture from 12/31 obtained - E coli - sensitive to multiple antibiotics, copy placed in paper chart.   ID eval at [facility] noted - start on Rocephin and monitor patient for further fever.
Culture from 12/31 obtained - E coli - sensitive to multiple antibiotics, copy placed in paper chart.   CASSANDRA vaz at [facility] noted - on Rocephin in the hospital.  prescription for cefpodoxime as outpatient.
-WBC count stable and patient afebrile. Urine and blood culture from this admission reviewed.   Culture from 12/31 obtained - E coli - sensitive to multiple antibiotics, copy placed in paper chart.   CASSANDRA vaz at [facility] noted - on Rocephin in the hospital.  prescription for cefpodoxime as outpatient.

## 2021-01-09 NOTE — PROGRESS NOTE ADULT - SUBJECTIVE AND OBJECTIVE BOX
Patient seen and examined at bedside.   She is confused but able to answer questions and follow commands.   no acute overnight events.   Patient tolerating PO intake.   Denies headaches, nausea, vomiting, chest pain, SOB, palpitations, abdominal pain, constipation, diarrhea, melena, hematochezia, dysuria.     T(C): 36.8 (01-09-21 @ 05:36), Max: 37.1 (01-08-21 @ 10:57)  HR: 83 (01-09-21 @ 05:36) (69 - 83)  BP: 148/66 (01-09-21 @ 05:36) (133/63 - 149/69)  RR: 18 (01-09-21 @ 05:36) (18 - 19)  SpO2: 97% (01-09-21 @ 05:36) (93% - 99%)  Wt(kg): --    Physical Exam:   GENERAL: well-groomed, well-developed, NAD  HEENT: head NC/AT; EOM intact, PERRLA, conjunctiva & sclera clear; hearing grossly intact, moist mucous membranes  NECK: supple, no JVD  RESPIRATORY: CTA B/L, no wheezing, rales, rhonchi or rubs  CARDIOVASCULAR: S1&S2, RRR, no murmurs or gallops  ABDOMEN: soft, non-tender, non-distended, + Bowel sounds x4 quadrants, no guarding, rebound or rigidity  MUSCULOSKELETAL:  no clubbing, cyanosis or edema of all 4 extremities  LYMPH: no cervical lymphadenopathy  VASCULAR: Radial pulses 2+ bilaterally, no varicose veins   SKIN: warm and dry, color normal  NEUROLOGIC: AA&O X2 [person and place only] CN2-12 intact w/ no focal deficits, no sensory loss, motor Strength 5/5 in UE & LE B/L  Psych: Normal mood and affect, normal behavior

## 2021-01-09 NOTE — PROGRESS NOTE ADULT - ATTENDING COMMENTS
Possible DC to home with home care today.   Daughter Amina updated.   Instructed to follow up with Dr. Godinez, she does tele-health visits and was recently seen last week.   F/u with PCP within 1 week of discharge. Possible DC to home with home care today.   Daughter Amina updated.   Instructed to follow up with Dr. Godinez, she does tele-health visits and was recently seen last week.   F/u with PCP within 1 week of discharge.    PADMA Webb updated. Possible DC to home with home care today.   Daughter Amina updated.   Instructed to follow up with Dr. Godinez, she does tele-health visits and was recently seen last week.   F/u with PCP within 1 week of discharge.    PADMA Webb updated. DIscussed with  Krystin Celeste who will arrange for transport to home with home care via ambulance

## 2021-01-09 NOTE — PROGRESS NOTE ADULT - PROBLEM SELECTOR PLAN 5
DVT proph - Lovenox  dc plan: home with home care saturday.
DVT proph - Lovenox
no signs or symptoms of active bleeding. Continue to monitor hgb.

## 2021-01-09 NOTE — PROGRESS NOTE ADULT - ASSESSMENT
89F with PMH baseline mild dementia, HTN, hypothyroidism, congenital single kidney, and autoimmune hepatitis, presented with increased confusion and weakness due to possible metabolic encephalopathy due to UTI

## 2021-01-13 ENCOUNTER — INPATIENT (INPATIENT)
Facility: HOSPITAL | Age: 86
LOS: 4 days | Discharge: NURSING, REHAB OR LONG-TERM CARE | DRG: 177 | End: 2021-01-18
Attending: HOSPITALIST | Admitting: HOSPITALIST
Payer: MEDICARE

## 2021-01-13 VITALS
DIASTOLIC BLOOD PRESSURE: 77 MMHG | RESPIRATION RATE: 20 BRPM | TEMPERATURE: 100 F | HEART RATE: 79 BPM | HEIGHT: 62 IN | WEIGHT: 100.09 LBS | OXYGEN SATURATION: 100 % | SYSTOLIC BLOOD PRESSURE: 176 MMHG

## 2021-01-13 DIAGNOSIS — Z29.9 ENCOUNTER FOR PROPHYLACTIC MEASURES, UNSPECIFIED: ICD-10-CM

## 2021-01-13 DIAGNOSIS — Z71.89 OTHER SPECIFIED COUNSELING: ICD-10-CM

## 2021-01-13 DIAGNOSIS — J18.9 PNEUMONIA, UNSPECIFIED ORGANISM: ICD-10-CM

## 2021-01-13 DIAGNOSIS — Z90.710 ACQUIRED ABSENCE OF BOTH CERVIX AND UTERUS: Chronic | ICD-10-CM

## 2021-01-13 DIAGNOSIS — I10 ESSENTIAL (PRIMARY) HYPERTENSION: ICD-10-CM

## 2021-01-13 DIAGNOSIS — Z90.49 ACQUIRED ABSENCE OF OTHER SPECIFIED PARTS OF DIGESTIVE TRACT: Chronic | ICD-10-CM

## 2021-01-13 DIAGNOSIS — E03.9 HYPOTHYROIDISM, UNSPECIFIED: ICD-10-CM

## 2021-01-13 DIAGNOSIS — Z98.890 OTHER SPECIFIED POSTPROCEDURAL STATES: Chronic | ICD-10-CM

## 2021-01-13 DIAGNOSIS — Z98.89 OTHER SPECIFIED POSTPROCEDURAL STATES: Chronic | ICD-10-CM

## 2021-01-13 LAB
ALBUMIN SERPL ELPH-MCNC: 2.2 G/DL — LOW (ref 3.3–5)
ALP SERPL-CCNC: 55 U/L — SIGNIFICANT CHANGE UP (ref 30–120)
ALT FLD-CCNC: 25 U/L DA — SIGNIFICANT CHANGE UP (ref 10–60)
ANION GAP SERPL CALC-SCNC: 7 MMOL/L — SIGNIFICANT CHANGE UP (ref 5–17)
APPEARANCE UR: ABNORMAL
APTT BLD: 26.2 SEC — LOW (ref 27.5–35.5)
AST SERPL-CCNC: 30 U/L — SIGNIFICANT CHANGE UP (ref 10–40)
BACTERIA # UR AUTO: ABNORMAL
BASOPHILS # BLD AUTO: 0.05 K/UL — SIGNIFICANT CHANGE UP (ref 0–0.2)
BASOPHILS NFR BLD AUTO: 0.4 % — SIGNIFICANT CHANGE UP (ref 0–2)
BILIRUB SERPL-MCNC: 0.6 MG/DL — SIGNIFICANT CHANGE UP (ref 0.2–1.2)
BILIRUB UR-MCNC: NEGATIVE — SIGNIFICANT CHANGE UP
BUN SERPL-MCNC: 10 MG/DL — SIGNIFICANT CHANGE UP (ref 7–23)
CALCIUM SERPL-MCNC: 9.1 MG/DL — SIGNIFICANT CHANGE UP (ref 8.4–10.5)
CHLORIDE SERPL-SCNC: 100 MMOL/L — SIGNIFICANT CHANGE UP (ref 96–108)
CO2 SERPL-SCNC: 29 MMOL/L — SIGNIFICANT CHANGE UP (ref 22–31)
COLOR SPEC: YELLOW — SIGNIFICANT CHANGE UP
COMMENT - URINE: SIGNIFICANT CHANGE UP
CREAT SERPL-MCNC: 0.59 MG/DL — SIGNIFICANT CHANGE UP (ref 0.5–1.3)
DIFF PNL FLD: ABNORMAL
EOSINOPHIL # BLD AUTO: 0.17 K/UL — SIGNIFICANT CHANGE UP (ref 0–0.5)
EOSINOPHIL NFR BLD AUTO: 1.3 % — SIGNIFICANT CHANGE UP (ref 0–6)
EPI CELLS # UR: ABNORMAL
FLU A RESULT: SIGNIFICANT CHANGE UP
FLU A RESULT: SIGNIFICANT CHANGE UP
FLUAV AG NPH QL: SIGNIFICANT CHANGE UP
FLUBV AG NPH QL: SIGNIFICANT CHANGE UP
GLUCOSE SERPL-MCNC: 108 MG/DL — HIGH (ref 70–99)
GLUCOSE UR QL: NEGATIVE MG/DL — SIGNIFICANT CHANGE UP
HCT VFR BLD CALC: 34.7 % — SIGNIFICANT CHANGE UP (ref 34.5–45)
HGB BLD-MCNC: 11.3 G/DL — LOW (ref 11.5–15.5)
IMM GRANULOCYTES NFR BLD AUTO: 0.3 % — SIGNIFICANT CHANGE UP (ref 0–1.5)
INR BLD: 1.24 RATIO — HIGH (ref 0.88–1.16)
KETONES UR-MCNC: NEGATIVE — SIGNIFICANT CHANGE UP
LACTATE SERPL-SCNC: 0.9 MMOL/L — SIGNIFICANT CHANGE UP (ref 0.7–2)
LEUKOCYTE ESTERASE UR-ACNC: ABNORMAL
LYMPHOCYTES # BLD AUTO: 16.9 % — SIGNIFICANT CHANGE UP (ref 13–44)
LYMPHOCYTES # BLD AUTO: 2.18 K/UL — SIGNIFICANT CHANGE UP (ref 1–3.3)
MCHC RBC-ENTMCNC: 28.6 PG — SIGNIFICANT CHANGE UP (ref 27–34)
MCHC RBC-ENTMCNC: 32.6 GM/DL — SIGNIFICANT CHANGE UP (ref 32–36)
MCV RBC AUTO: 87.8 FL — SIGNIFICANT CHANGE UP (ref 80–100)
MONOCYTES # BLD AUTO: 1.17 K/UL — HIGH (ref 0–0.9)
MONOCYTES NFR BLD AUTO: 9 % — SIGNIFICANT CHANGE UP (ref 2–14)
NEUTROPHILS # BLD AUTO: 9.32 K/UL — HIGH (ref 1.8–7.4)
NEUTROPHILS NFR BLD AUTO: 72.1 % — SIGNIFICANT CHANGE UP (ref 43–77)
NITRITE UR-MCNC: NEGATIVE — SIGNIFICANT CHANGE UP
NRBC # BLD: 0 /100 WBCS — SIGNIFICANT CHANGE UP (ref 0–0)
PH UR: 7 — SIGNIFICANT CHANGE UP (ref 5–8)
PLATELET # BLD AUTO: 360 K/UL — SIGNIFICANT CHANGE UP (ref 150–400)
POTASSIUM SERPL-MCNC: 4 MMOL/L — SIGNIFICANT CHANGE UP (ref 3.5–5.3)
POTASSIUM SERPL-SCNC: 4 MMOL/L — SIGNIFICANT CHANGE UP (ref 3.5–5.3)
PROT SERPL-MCNC: 7.9 G/DL — SIGNIFICANT CHANGE UP (ref 6–8.3)
PROT UR-MCNC: 15 MG/DL
PROTHROM AB SERPL-ACNC: 14.8 SEC — HIGH (ref 10.6–13.6)
RBC # BLD: 3.95 M/UL — SIGNIFICANT CHANGE UP (ref 3.8–5.2)
RBC # FLD: 14 % — SIGNIFICANT CHANGE UP (ref 10.3–14.5)
RBC CASTS # UR COMP ASSIST: ABNORMAL /HPF (ref 0–4)
RSV RESULT: SIGNIFICANT CHANGE UP
RSV RNA RESP QL NAA+PROBE: SIGNIFICANT CHANGE UP
SARS-COV-2 RNA SPEC QL NAA+PROBE: SIGNIFICANT CHANGE UP
SODIUM SERPL-SCNC: 136 MMOL/L — SIGNIFICANT CHANGE UP (ref 135–145)
SP GR SPEC: 1.01 — SIGNIFICANT CHANGE UP (ref 1.01–1.02)
UROBILINOGEN FLD QL: NEGATIVE MG/DL — SIGNIFICANT CHANGE UP
WBC # BLD: 12.93 K/UL — HIGH (ref 3.8–10.5)
WBC # FLD AUTO: 12.93 K/UL — HIGH (ref 3.8–10.5)
WBC UR QL: ABNORMAL

## 2021-01-13 PROCEDURE — 99223 1ST HOSP IP/OBS HIGH 75: CPT | Mod: AI

## 2021-01-13 PROCEDURE — 99285 EMERGENCY DEPT VISIT HI MDM: CPT

## 2021-01-13 PROCEDURE — 93010 ELECTROCARDIOGRAM REPORT: CPT

## 2021-01-13 PROCEDURE — 71045 X-RAY EXAM CHEST 1 VIEW: CPT | Mod: 26

## 2021-01-13 RX ORDER — METOPROLOL TARTRATE 50 MG
5 TABLET ORAL ONCE
Refills: 0 | Status: COMPLETED | OUTPATIENT
Start: 2021-01-13 | End: 2021-01-13

## 2021-01-13 RX ORDER — SODIUM CHLORIDE 9 MG/ML
1000 INJECTION, SOLUTION INTRAVENOUS
Refills: 0 | Status: DISCONTINUED | OUTPATIENT
Start: 2021-01-13 | End: 2021-01-18

## 2021-01-13 RX ORDER — AZITHROMYCIN 500 MG/1
500 TABLET, FILM COATED ORAL ONCE
Refills: 0 | Status: COMPLETED | OUTPATIENT
Start: 2021-01-13 | End: 2021-01-13

## 2021-01-13 RX ORDER — ESCITALOPRAM OXALATE 10 MG/1
2 TABLET, FILM COATED ORAL
Qty: 0 | Refills: 0 | DISCHARGE

## 2021-01-13 RX ORDER — CEFEPIME 1 G/1
1000 INJECTION, POWDER, FOR SOLUTION INTRAMUSCULAR; INTRAVENOUS EVERY 24 HOURS
Refills: 0 | Status: DISCONTINUED | OUTPATIENT
Start: 2021-01-13 | End: 2021-01-17

## 2021-01-13 RX ORDER — ENOXAPARIN SODIUM 100 MG/ML
40 INJECTION SUBCUTANEOUS DAILY
Refills: 0 | Status: DISCONTINUED | OUTPATIENT
Start: 2021-01-13 | End: 2021-01-18

## 2021-01-13 RX ORDER — LACTOBACILLUS ACIDOPHILUS 100MM CELL
1 CAPSULE ORAL
Qty: 0 | Refills: 0 | DISCHARGE

## 2021-01-13 RX ORDER — METOPROLOL TARTRATE 50 MG
1 TABLET ORAL
Qty: 0 | Refills: 0 | DISCHARGE

## 2021-01-13 RX ORDER — SODIUM CHLORIDE 9 MG/ML
1400 INJECTION INTRAMUSCULAR; INTRAVENOUS; SUBCUTANEOUS ONCE
Refills: 0 | Status: COMPLETED | OUTPATIENT
Start: 2021-01-13 | End: 2021-01-13

## 2021-01-13 RX ORDER — LEVOTHYROXINE SODIUM 125 MCG
75 TABLET ORAL DAILY
Refills: 0 | Status: DISCONTINUED | OUTPATIENT
Start: 2021-01-13 | End: 2021-01-18

## 2021-01-13 RX ORDER — CHOLECALCIFEROL (VITAMIN D3) 125 MCG
1 CAPSULE ORAL
Qty: 0 | Refills: 0 | DISCHARGE

## 2021-01-13 RX ORDER — ESCITALOPRAM OXALATE 10 MG/1
10 TABLET, FILM COATED ORAL DAILY
Refills: 0 | Status: DISCONTINUED | OUTPATIENT
Start: 2021-01-13 | End: 2021-01-18

## 2021-01-13 RX ORDER — CEFTRIAXONE 500 MG/1
1000 INJECTION, POWDER, FOR SOLUTION INTRAMUSCULAR; INTRAVENOUS ONCE
Refills: 0 | Status: COMPLETED | OUTPATIENT
Start: 2021-01-13 | End: 2021-01-13

## 2021-01-13 RX ORDER — ACETAMINOPHEN 500 MG
650 TABLET ORAL ONCE
Refills: 0 | Status: COMPLETED | OUTPATIENT
Start: 2021-01-13 | End: 2021-01-13

## 2021-01-13 RX ORDER — SACCHAROMYCES BOULARDII 250 MG
250 POWDER IN PACKET (EA) ORAL
Refills: 0 | Status: DISCONTINUED | OUTPATIENT
Start: 2021-01-13 | End: 2021-01-18

## 2021-01-13 RX ORDER — LEVOTHYROXINE SODIUM 125 MCG
1 TABLET ORAL
Qty: 0 | Refills: 0 | DISCHARGE

## 2021-01-13 RX ORDER — ASPIRIN/CALCIUM CARB/MAGNESIUM 324 MG
1 TABLET ORAL
Qty: 0 | Refills: 0 | DISCHARGE

## 2021-01-13 RX ORDER — METOPROLOL TARTRATE 50 MG
50 TABLET ORAL DAILY
Refills: 0 | Status: DISCONTINUED | OUTPATIENT
Start: 2021-01-13 | End: 2021-01-18

## 2021-01-13 RX ADMIN — AZITHROMYCIN 500 MILLIGRAM(S): 500 TABLET, FILM COATED ORAL at 15:45

## 2021-01-13 RX ADMIN — Medication 5 MILLIGRAM(S): at 22:48

## 2021-01-13 RX ADMIN — SODIUM CHLORIDE 1400 MILLILITER(S): 9 INJECTION INTRAMUSCULAR; INTRAVENOUS; SUBCUTANEOUS at 12:15

## 2021-01-13 RX ADMIN — SODIUM CHLORIDE 1400 MILLILITER(S): 9 INJECTION INTRAMUSCULAR; INTRAVENOUS; SUBCUTANEOUS at 14:00

## 2021-01-13 RX ADMIN — CEFTRIAXONE 1000 MILLIGRAM(S): 500 INJECTION, POWDER, FOR SOLUTION INTRAMUSCULAR; INTRAVENOUS at 13:51

## 2021-01-13 RX ADMIN — AZITHROMYCIN 255 MILLIGRAM(S): 500 TABLET, FILM COATED ORAL at 14:45

## 2021-01-13 RX ADMIN — SODIUM CHLORIDE 75 MILLILITER(S): 9 INJECTION, SOLUTION INTRAVENOUS at 16:58

## 2021-01-13 RX ADMIN — Medication 50 MILLIGRAM(S): at 17:29

## 2021-01-13 RX ADMIN — CEFTRIAXONE 100 MILLIGRAM(S): 500 INJECTION, POWDER, FOR SOLUTION INTRAMUSCULAR; INTRAVENOUS at 13:21

## 2021-01-13 RX ADMIN — ENOXAPARIN SODIUM 40 MILLIGRAM(S): 100 INJECTION SUBCUTANEOUS at 17:29

## 2021-01-13 RX ADMIN — Medication 250 MILLIGRAM(S): at 17:30

## 2021-01-13 RX ADMIN — Medication 650 MILLIGRAM(S): at 12:14

## 2021-01-13 RX ADMIN — ESCITALOPRAM OXALATE 10 MILLIGRAM(S): 10 TABLET, FILM COATED ORAL at 17:30

## 2021-01-13 NOTE — H&P ADULT - PROBLEM SELECTOR PLAN 5
discussed overall failure to thrive, progressively weakened state, recurrent admissions. daughter thinks she has paperwork stating she does not want resuscitation or feeding tube. will look for them.   D/w Dr Dumont - will see pt in AM

## 2021-01-13 NOTE — ED PROVIDER NOTE - LAB INTERPRETATION
FLU A B RSV Detection by PCR (01.13.21 @ 12:29)   Flu A Result: NotDetec: The Flu A B RSV assay is a Real-Time PCR test for the qualitative   detection and differentiation of Influenza A, Influenza B, and   Respiratory Syncytial Virus on nasopharyngeal swabs. The results should   be interpreted in the context of all clinical and laboratory findings.   Flu B Result: NotDetec   RSV Result: NotDetec   COVID-19 PCR . (01.13.21 @ 12:41)   COVID-19 PCR: NotDetec

## 2021-01-13 NOTE — H&P ADULT - HISTORY OF PRESENT ILLNESS
89F with PMH baseline dementia, HTN, hypothyroidism, congenital single kidney, and autoimmune hepatitis, s/p left hip hemiarthroplasty for fracture on 11/16/20 then dc home from [facility] on 12/22/20, recently dc from [facility] for UTI on 1/9/21.   While at home daughter reports patient has been weak overal.  89F with PMH baseline dementia, HTN, hypothyroidism, congenital single kidney, and autoimmune hepatitis, s/p left hip hemiarthroplasty for fracture on 11/16/20 then dc home from [facility] on 12/22/20, recently dc from [facility] for UTI on 1/9/21.   While at home daughter reports patient has been weak overall.  Has been even more confused.  Not eating but daughter has been forcing her to drink water (regular thin water).   Last 24 hours patient has felt warm and facial erythema.  She also noticed a cough.   She took her temp and it was normal but brought her to the ER anyway.    Daughter also reported patient has had some loose yellow stools.   89F with PMH baseline dementia, HTN, hypothyroidism, congenital single kidney, and autoimmune hepatitis, s/p left hip hemiarthroplasty for fracture on 11/16/20 then dc home from [facility] on 12/22/20, recently dc from [facility] for UTI on 1/9/21.   While at home daughter reports patient has been weaker overall, has needed increased assistance ambulating.  Has been even more confused.  Not eating but daughter has been forcing her to drink water (regular thin water).   Last 24 hours patient has felt warm and facial erythema.  She also noticed a cough.   She took her temp and it was normal but brought her to the ER anyway.    Daughter also reported patient has had some loose yellow stools.

## 2021-01-13 NOTE — H&P ADULT - NSHPLABSRESULTS_GEN_ALL_CORE
Labs:                        11.3   12.93 )-----------( 360      ( 2021 14:39 )             34.7         136  |  100  |  10  ----------------------------<  108<H>  4.0   |  29  |  0.59    Ca    9.1      2021 12:28    TPro  7.9  /  Alb  2.2<L>  /  TBili  0.6  /  DBili  x   /  AST  30  /  ALT  25  /  AlkPhos  55          Urinalysis Basic - ( 2021 13:17 )  Color: Yellow / Appearance: Slightly Turbid / S.010 / pH: x  Gluc: x / Ketone: Negative  / Bili: Negative / Urobili: Negative mg/dL   Blood: x / Protein: 15 mg/dL / Nitrite: Negative   Leuk Esterase: Trace / RBC: 3-5 /HPF / WBC 6-10   Sq Epi: x / Non Sq Epi: Moderate / Bacteria: Moderate    PT/INR - ( 2021 12:28 )   PT: 14.8 sec;   INR: 1.24 ratio    PTT - ( 2021 12:28 )  PTT:26.2 sec    Lactate Trend   @ 12:28 Lactate:0.9     CAPILLARY BLOOD GLUCOSE      EKG:   Personally Reviewed:  [ ] YES     Imaging:  Right upper lobe infiltrate.  new since   Personally Reviewed:  [ x] YES

## 2021-01-13 NOTE — ED PROVIDER NOTE - OBJECTIVE STATEMENT
pt bib ems for fever, lethargy today. pt d/c form hospital 1/9 s/p uti. no cough, ha, cp, sob, abd pain. + loose stool.  pmd - ingwer

## 2021-01-13 NOTE — H&P ADULT - NSHPPHYSICALEXAM_GEN_ALL_CORE
PHYSICAL EXAM:  Vital Signs Last 24 Hrs  T(C): 36.8 (13 Jan 2021 16:14), Max: 38 (13 Jan 2021 11:52)  T(F): 98.2 (13 Jan 2021 16:14), Max: 100.4 (13 Jan 2021 11:52)  HR: 65 (13 Jan 2021 16:14) (65 - 82)  BP: 123/66 (13 Jan 2021 16:14) (120/56 - 181/72)  BP(mean): --  RR: 15 (13 Jan 2021 16:14) (15 - 24)  SpO2: 99% (13 Jan 2021 16:14) (99% - 100%)    GENERAL: NAD, well-groomed, well-developed  HEAD:  Atraumatic, Normocephalic  EYES: conjunctiva and sclera clear  ENMT: Moist mucous membranes  NECK: Supple, No JVD  NERVOUS SYSTEM:  Awake and alert, pleasantly confused.  moving all extremities.   CHEST/LUNG: Clear to auscultation bilaterally;  few scattered crackles.   HEART: Regular rate and rhythm  ABDOMEN: Soft, Nontender, Nondistended; Bowel sounds present  EXTREMITIES:  2+ Peripheral Pulses, No clubbing, cyanosis, or edema

## 2021-01-13 NOTE — ED ADULT NURSE NOTE - PMH
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

## 2021-01-13 NOTE — ED ADULT NURSE REASSESSMENT NOTE - NS ED NURSE REASSESS COMMENT FT1
Spoke with patient's daughter Amina to keep her informed of her mother's status and that she had been transported to [facility]. Daughter given number to call for [facility] further updates.

## 2021-01-13 NOTE — ED ADULT NURSE REASSESSMENT NOTE - NS ED NURSE REASSESS COMMENT FT1
Daughter Alina notified of patient's need for admission by Dr. Stafford. Patient comfortable and no acute distress. Covid result negative, isolation precautions discontinued.

## 2021-01-13 NOTE — ED ADULT NURSE NOTE - OBJECTIVE STATEMENT
Patient presents brought in via EMS with fever and weakness. Patient with recent hospitalization for UTI and was discharged on antibiotic Ceftriaxone which was changed to Cefpodoxime yesterday. Patient alert to person, pace and time but poor historian, does not know why she was brought here except that her daughter called EMS because she felt weak and couldn't get out of the car at the doctor's office. Skin hot and dry, denies pain, unknown if any sick contacts, no cough.

## 2021-01-13 NOTE — H&P ADULT - NSICDXPASTMEDICALHX_GEN_ALL_CORE_FT
PAST MEDICAL HISTORY:  Essential hypertension     Hepatitis autoimmune hepatitis 20 years ago    Hypothyroid     Single kidney Congenital    Urinary tract infection

## 2021-01-13 NOTE — PATIENT PROFILE ADULT - SURGICAL SITE INCISION
2020    TELEHEALTH EVALUATION -- Audio/Visual (During [place]ART[place]-18 public health emergency)    HPI:    Mague Hernandez (:  1955) has requested an audio/video evaluation for the following concern(s):    Left eye swelling/ erythema  Started on Tuesday- worse throughout the day  Does not think he got anything in his eye  Started on outer corner soreness  Swelling now starting to go up eyelid  No pain  No changes in vision, can still move eye  No bumps on lid  This morning had crusty drainage, no discharge  Is worsening    Review of Systems   Constitutional: Negative for activity change, appetite change and fever. Eyes: Positive for redness. Negative for pain, discharge and itching. Neurological: Negative for dizziness and headaches. Prior to Visit Medications    Medication Sig Taking? Authorizing Provider   sulfamethoxazole-trimethoprim (BACTRIM DS;SEPTRA DS) 800-160 MG per tablet Take 1 tablet by mouth 2 times daily for 10 days Yes KATERINA Garcia CNP   amoxicillin (AMOXIL) 875 MG tablet Take 1 tablet by mouth 2 times daily for 10 days Yes KAETRINA Garcia CNP   lisinopril (PRINIVIL) 10 MG tablet Take 1 tablet by mouth daily Yes Carlos Paulino MD   simvastatin (ZOCOR) 10 MG tablet Take 1 tablet by mouth nightly Yes Carlos Paulino MD   fluticasone [facility]) 50 MCG/ACT nasal spray 2 sprays by Nasal route daily Yes Carlos Paulino MD   methylcellulose (CITRUCEL) 500 MG TABS Take 2 tablets by mouth daily Yes Historical Provider, MD   NONFORMULARY Take 10 mg by mouth daily Yes Historical Provider, MD   Cholecalciferol (VITAMIN D3) 2000 UNITS CAPS Take 1 capsule by mouth daily. Yes Historical Provider, MD   Multiple Vitamin (MULTIVITAMINS PO) Take  by mouth. Yes Maikel Dueñas MD   fish oil-omega-3 fatty acids (FISH OIL) 1000 MG capsule Take 2 g by mouth daily.  Yes Maikel Dueñas MD       Social History     Tobacco Use    Smoking status: Former Smoker     Packs/day: 0.25 no

## 2021-01-13 NOTE — H&P ADULT - PROBLEM SELECTOR PLAN 1
was supposed to be on dysphagia diet but daughter was overwhelmed when patient came home.   Aspiration precautions, puree diet with thick fluids, swallow re-eval  d/w Dr ZACHERY Navarrete - will place on IV cefepime for now and monitor.  f/u cultures.   also - urine with 6-10 wbc so new cultures sent  diarrhea - send stool for cdiff and GI PCR

## 2021-01-13 NOTE — H&P ADULT - ASSESSMENT
89F with PMH baseline dementia, HTN, hypothyroidism, congenital single kidney, and autoimmune hepatitis, s/p left hip hemiarthroplasty for fracture on 11/16/20, recent UTI now with pneumonia, possible aspiration.

## 2021-01-13 NOTE — ED ADULT NURSE NOTE - PSH
H/O abdominal hysterectomy    History of cholecystectomy    History of repair of hip fracture  left 11/14/2020  S/P appy

## 2021-01-14 LAB
ANION GAP SERPL CALC-SCNC: 8 MMOL/L — SIGNIFICANT CHANGE UP (ref 5–17)
BASOPHILS # BLD AUTO: 0.08 K/UL — SIGNIFICANT CHANGE UP (ref 0–0.2)
BASOPHILS NFR BLD AUTO: 0.5 % — SIGNIFICANT CHANGE UP (ref 0–2)
BUN SERPL-MCNC: 9 MG/DL — SIGNIFICANT CHANGE UP (ref 7–23)
C DIFF BY PCR RESULT: DETECTED
C DIFF TOX GENS STL QL NAA+PROBE: SIGNIFICANT CHANGE UP
CALCIUM SERPL-MCNC: 8.7 MG/DL — SIGNIFICANT CHANGE UP (ref 8.4–10.5)
CHLORIDE SERPL-SCNC: 101 MMOL/L — SIGNIFICANT CHANGE UP (ref 96–108)
CO2 SERPL-SCNC: 26 MMOL/L — SIGNIFICANT CHANGE UP (ref 22–31)
CREAT SERPL-MCNC: 0.54 MG/DL — SIGNIFICANT CHANGE UP (ref 0.5–1.3)
EOSINOPHIL # BLD AUTO: 0.13 K/UL — SIGNIFICANT CHANGE UP (ref 0–0.5)
EOSINOPHIL NFR BLD AUTO: 0.8 % — SIGNIFICANT CHANGE UP (ref 0–6)
GLUCOSE SERPL-MCNC: 115 MG/DL — HIGH (ref 70–99)
HCT VFR BLD CALC: 32.4 % — LOW (ref 34.5–45)
HGB BLD-MCNC: 10.6 G/DL — LOW (ref 11.5–15.5)
IMM GRANULOCYTES NFR BLD AUTO: 0.6 % — SIGNIFICANT CHANGE UP (ref 0–1.5)
LYMPHOCYTES # BLD AUTO: 12.1 % — LOW (ref 13–44)
LYMPHOCYTES # BLD AUTO: 2.01 K/UL — SIGNIFICANT CHANGE UP (ref 1–3.3)
MAGNESIUM SERPL-MCNC: 1.7 MG/DL — SIGNIFICANT CHANGE UP (ref 1.6–2.6)
MCHC RBC-ENTMCNC: 28.3 PG — SIGNIFICANT CHANGE UP (ref 27–34)
MCHC RBC-ENTMCNC: 32.7 GM/DL — SIGNIFICANT CHANGE UP (ref 32–36)
MCV RBC AUTO: 86.4 FL — SIGNIFICANT CHANGE UP (ref 80–100)
MONOCYTES # BLD AUTO: 1.38 K/UL — HIGH (ref 0–0.9)
MONOCYTES NFR BLD AUTO: 8.3 % — SIGNIFICANT CHANGE UP (ref 2–14)
NEUTROPHILS # BLD AUTO: 12.92 K/UL — HIGH (ref 1.8–7.4)
NEUTROPHILS NFR BLD AUTO: 77.7 % — HIGH (ref 43–77)
NRBC # BLD: 0 /100 WBCS — SIGNIFICANT CHANGE UP (ref 0–0)
PLATELET # BLD AUTO: 361 K/UL — SIGNIFICANT CHANGE UP (ref 150–400)
POTASSIUM SERPL-MCNC: 3.3 MMOL/L — LOW (ref 3.5–5.3)
POTASSIUM SERPL-SCNC: 3.3 MMOL/L — LOW (ref 3.5–5.3)
RBC # BLD: 3.75 M/UL — LOW (ref 3.8–5.2)
RBC # FLD: 14.3 % — SIGNIFICANT CHANGE UP (ref 10.3–14.5)
SARS-COV-2 IGG SERPL QL IA: NEGATIVE — SIGNIFICANT CHANGE UP
SARS-COV-2 IGM SERPL IA-ACNC: <0.1 INDEX — SIGNIFICANT CHANGE UP
SODIUM SERPL-SCNC: 135 MMOL/L — SIGNIFICANT CHANGE UP (ref 135–145)
WBC # BLD: 16.62 K/UL — HIGH (ref 3.8–10.5)
WBC # FLD AUTO: 16.62 K/UL — HIGH (ref 3.8–10.5)

## 2021-01-14 PROCEDURE — 99233 SBSQ HOSP IP/OBS HIGH 50: CPT

## 2021-01-14 PROCEDURE — 99497 ADVNCD CARE PLAN 30 MIN: CPT

## 2021-01-14 RX ORDER — POTASSIUM CHLORIDE 20 MEQ
20 PACKET (EA) ORAL ONCE
Refills: 0 | Status: COMPLETED | OUTPATIENT
Start: 2021-01-14 | End: 2021-01-14

## 2021-01-14 RX ADMIN — Medication 50 MILLIGRAM(S): at 06:15

## 2021-01-14 RX ADMIN — ENOXAPARIN SODIUM 40 MILLIGRAM(S): 100 INJECTION SUBCUTANEOUS at 13:38

## 2021-01-14 RX ADMIN — ESCITALOPRAM OXALATE 10 MILLIGRAM(S): 10 TABLET, FILM COATED ORAL at 15:15

## 2021-01-14 RX ADMIN — Medication 250 MILLIGRAM(S): at 06:15

## 2021-01-14 RX ADMIN — CEFEPIME 100 MILLIGRAM(S): 1 INJECTION, POWDER, FOR SOLUTION INTRAMUSCULAR; INTRAVENOUS at 13:38

## 2021-01-14 RX ADMIN — Medication 20 MILLIEQUIVALENT(S): at 13:42

## 2021-01-14 RX ADMIN — Medication 250 MILLIGRAM(S): at 18:33

## 2021-01-14 RX ADMIN — Medication 75 MICROGRAM(S): at 06:15

## 2021-01-14 NOTE — SWALLOW BEDSIDE ASSESSMENT ADULT - COMMENTS
Upon arrival, pt sleeping in bed. Pt arousable to voice and able to maintain adequate level of alertness for remainder of session. Pt was on supplemental O2 via NC. Pt demonstrated difficulty following low level directives despite max multimodal cues. Pt's voice was somewhat hoarse/weak. Pt denied pain pre and post assessment.    Pt known to this service upon a recent admission. MBS was completed on 1/8, at which time pt was recommended puree with honey thick liquids via teaspoon. There was evidence of SILENT penetration with nectar thick liquids (please reference report for additional details). According to H&P, pt's daughter reported she was feeding her mother thin liquids prior to this admission.    CXR 1/13: "Right upper lobe infiltrate."   Pt's WBC is elevated, no fever.

## 2021-01-14 NOTE — DIETITIAN NUTRITION RISK NOTIFICATION - MALNUTRITION EVALUATION AS DEMONSTRATED BY (ADULTS > 20 YEARS OF AGE)
Weight loss.../Loss of subcutaneous fat.../Loss of muscle...
Weight loss.../Loss of subcutaneous fat.../Loss of muscle...

## 2021-01-14 NOTE — GOALS OF CARE CONVERSATION - ADVANCED CARE PLANNING - NS PRO AD PATIENT TYPE
Health Care Proxy (HCP)/Medical Orders for Life-Sustaining Treatment (MOLST)
Health Care Proxy (HCP)

## 2021-01-14 NOTE — SWALLOW BEDSIDE ASSESSMENT ADULT - ORAL PHASE
suspected posterior loss/Decreased anterior-posterior movement of the bolus/Delayed oral transit time

## 2021-01-14 NOTE — CONSULT NOTE ADULT - ASSESSMENT
Chart reviewed.   Full consult note to follow tomorrow.   Please call with any questions.   Marilyn Navarrete M.D.  [facility], Division of Infectious Diseases [phone]  For over the weekend and after hours, please call [phone]  
Pt is 89W w/ PMHx of dementia, HTN, hypothyroidism, congenital single kidney, and autoimmune hepatitis, s/p left hip hemiarthroplasty for fracture on 11/16/20, recent admission for UTI now p/w weakness     Aspiration PNA vs. HAP  Leukocytosis  Given recent hospitalization and admission to rehab, would opt to cover her for noscomial organisms  Imaging reviewed, CXR w/ RUL infiltrate  -flu/RSV/COVID negative  -pending BCx x2 and UCx  -sending uPNA Ag  -trend fevers/leukocytosis  -maintain aspiration precautions  -c/w cefepime 1gm q24h, will likely need 5-7 day course    CKD   CrCL ~18  renally dose medications

## 2021-01-14 NOTE — SWALLOW BEDSIDE ASSESSMENT ADULT - DIET PRIOR TO ADMI
per charting, pt's daughter was feeding pt thin liquids despite SLP rec for honey thick liquids upon recent admission

## 2021-01-14 NOTE — GOALS OF CARE CONVERSATION - ADVANCED CARE PLANNING - CONVERSATION DETAILS
Writer spoke with dtr/HCP Amina Makeda. Reviewed patient's medical and social history as well as events leading to patient's hospitalization. Writer discussed patient's current diagnosis (DementiaUTI,hepatitis,HTN,PNA),  medical condition and management,   prognosis, and life expectancy. Inquired about patient's wishes regarding extent of medical care to be provided including escalation of medical care into the ICU and use of vasopressor support. In addition, the writer inquired about thoughts regarding cardiopulmnary resuscitation, artificial nutrition and hydration including use of feeding tubes and IVF, antibiotics, and further investigative studies such as blood draws and radiology. Amina  showed good insight into medical condition,she previously had a MOLST. All questions answered. Writer recommended new MOLST.Dtr/HCP consented to DNR/DNI  status. MOLST form filled out and placed in chart. Psychosocial support provided.
Writer spoke with Amina (dtr/HCP). Reviewed patient's medical and social history as well as events leading to patient's hospitalization. Writer discussed patient's current diagnosis PNA,UTI,dementia,hepatitis,HTN),  medical condition and management,  prognosis, and life expectancy. Inquired about patient's wishes regarding extent of medical care to be provided including escalation of medical care into the ICU and use of vasopressor support. In addition, the writer inquired about thoughts regarding cardiopulmnary resuscitation, artificial nutrition and hydration including use of feeding tubes and IVF, antibiotics, and further investigative studies such as blood draws and radiology. Amina showed good  insight into medical condition. All questions answered. Writer recommended a home hospice evaluation for the pt.Amina needs help caring for her Mom and doesn't have the funds to pvt hire. Amina will discuss with social work in AM. Dr Escalante aware. Psychosocial support provided.

## 2021-01-14 NOTE — DIETITIAN NUTRITION RISK NOTIFICATION - PROVIDER ATTESTATION
By signing this assessment you are acknowledging and agree with the diagnosis/diagnoses assigned by the Registered Dietitian
By signing this assessment you are acknowledging and agree with the diagnosis/diagnoses assigned by the Registered Dietitian

## 2021-01-14 NOTE — SWALLOW BEDSIDE ASSESSMENT ADULT - SLP PERTINENT HISTORY OF CURRENT PROBLEM
Per charting, "89F with PMH baseline dementia, HTN, hypothyroidism, congenital single kidney, and autoimmune hepatitis, s/p left hip hemiarthroplasty for fracture on 11/16/20 then dc home from [facility] on 12/22/20, recently dc from [facility] for UTI on 1/9/21.   While at home daughter reports patient has been weaker overall, has needed increased assistance ambulating.  Has been even more confused.  Not eating but daughter has been forcing her to drink water (regular thin water).   Last 24 hours patient has felt warm and facial erythema.  She also noticed a cough.   She took her temp and it was normal but brought her to the ER anyway."

## 2021-01-14 NOTE — PHYSICAL THERAPY INITIAL EVALUATION ADULT - PERTINENT HX OF CURRENT PROBLEM, REHAB EVAL
Pt admitted with complaints of weakness and increased assistance required to ambulate (as per daughter). Pt is confused.

## 2021-01-14 NOTE — PROGRESS NOTE ADULT - SUBJECTIVE AND OBJECTIVE BOX
Patient is a 89y old  Female who presents with a chief complaint of PNA (2021 08:30)    INTERVAL HPI/OVERNIGHT EVENTS: awake and alert.      MEDICATIONS  (STANDING):  cefepime   IVPB 1000 milliGRAM(s) IV Intermittent every 24 hours  enoxaparin Injectable 40 milliGRAM(s) SubCutaneous daily  escitalopram 10 milliGRAM(s) Oral daily  lactated ringers. 1000 milliLiter(s) (75 mL/Hr) IV Continuous <Continuous>  levothyroxine 75 MICROGram(s) Oral daily  metoprolol succinate ER 50 milliGRAM(s) Oral daily  potassium chloride   Powder 20 milliEquivalent(s) Oral once  saccharomyces boulardii 250 milliGRAM(s) Oral two times a day    MEDICATIONS  (PRN):      Allergies  latex (Rash)  penicillin (Rash)  sulfa drugs (Rash)      REVIEW OF SYSTEMS:  unable due to dementia    Vital Signs Last 24 Hrs  T(C): 37.7 (2021 05:44), Max: 38 (2021 11:52)  T(F): 99.8 (2021 05:44), Max: 100.4 (2021 11:52)  HR: 93 (2021 05:44) (65 - 93)  BP: 149/69 (2021 05:44) (120/56 - 187/78)  BP(mean): --  RR: 18 (2021 05:44) (15 - 24)  SpO2: 90% (2021 05:44) (90% - 100%)    PHYSICAL EXAM:  GENERAL: NAD, well-groomed, well-developed  HEAD:  Atraumatic, Normocephalic  EYES:  conjunctiva and sclera clear  ENMT: Moist mucous membranes  NECK: Supple, No JVD  NERVOUS SYSTEM:  Awake and alert, moving extremities.   CHEST/LUNG: Clear to auscultation bilaterally; few scattered crackles  HEART: Regular rate and rhythm;   ABDOMEN: Soft, Nontender, Nondistended; Bowel sounds present  EXTREMITIES:  2+ Peripheral Pulses, No clubbing, cyanosis, or edema      LABS:                        10.6   16.62 )-----------( 361      ( 2021 08:00 )             32.4     2021 08:00    135    |  101    |  9      ----------------------------<  115    3.3     |  26     |  0.54     Ca    8.7        2021 08:00  Mg     1.7       2021 08:00    TPro  7.9    /  Alb  2.2    /  TBili  0.6    /  DBili  x      /  AST  30     /  ALT  25     /  AlkPhos  55     2021 12:28    PT/INR - ( 2021 12:28 )   PT: 14.8 sec;   INR: 1.24 ratio    PTT - ( 2021 12:28 )  PTT:26.2 sec    Urinalysis Basic - ( 2021 13:17 )  Color: Yellow / Appearance: Slightly Turbid / S.010 / pH: x  Gluc: x / Ketone: Negative  / Bili: Negative / Urobili: Negative mg/dL   Blood: x / Protein: 15 mg/dL / Nitrite: Negative   Leuk Esterase: Trace / RBC: 3-5 /HPF / WBC 6-10   Sq Epi: x / Non Sq Epi: Moderate / Bacteria: Moderate      CAPILLARY BLOOD GLUCOSE          RADIOLOGY & ADDITIONAL TESTS:    Imaging Personally Reviewed:  [ ] YES     Consultant(s) Notes Reviewed:  ID    Care Discussed with Consultants/Other Providers:    Advanced Directives: [ ] DNR  [ ] No feeding tube  [ ] MOLST in chart  [ ] MOLST completed today  [ ] Unknown

## 2021-01-14 NOTE — PROGRESS NOTE ADULT - PROBLEM SELECTOR PLAN 1
was supposed to be on dysphagia diet but daughter was overwhelmed when patient came home.   Aspiration precautions, puree diet with thick fluids, swallow re-eval  d/w Dr ZACHERY Navarrete - will place on IV cefepime for now and monitor.  f/u cultures.   also - urine with 6-10 wbc so new cultures sent  diarrhea at home- send stool for cdiff and GI PCR -- no diarrhea since admission.  if no further diarrhea will dc orders and isolation

## 2021-01-14 NOTE — SWALLOW BEDSIDE ASSESSMENT ADULT - SWALLOW EVAL: DIAGNOSIS
Pt was administered PO trials of puree and honey thick liquids via teaspoon. Additional PO trials of varying textures deferred at this time 2/2 known h/o dysphagia with silent penetration events. Pt p/w 1. Mild oral dysphagia marked by adequate retrieval and containment, prolonged manipulation and transfer, suspected posterior loss, and adequate clearance post swallow. 2. Pharyngeal dysphagia marked by suspected delayed swallow onset, +laryngeal elevation to palpation, and no (c)overt s/sx of aspiration. 3. Recommend pt resume PO diet of puree and honey thick liquids. Administer all liquids via TEASPOON. Strict aspiration precautions. Feed ONLY when fully awake/alert and when on NC or room air. Continue to monitor respiratory status closely; if there is a decline in status, please hold PO and notify SLP. Provide 1:1 supervision during all PO intake. Position pt upright 90 degrees, administer all PO via teaspoon, pace meal slowly. Discussed with RN, called out to MD.

## 2021-01-14 NOTE — SWALLOW BEDSIDE ASSESSMENT ADULT - ASR SWALLOW RECOMMEND DIAG
Objective assessment is not warranted at this time, as pt had recently participated in MBS on 1/8. Pt was then s/p d/c home, no swallowing intervention noted. Therefore, there would likely be no change in swallow function in comparison to initial MBS. Will continue to follow to ensure diet tolerance at bedside, as schedule permits.

## 2021-01-14 NOTE — DIETITIAN INITIAL EVALUATION ADULT. - OTHER INFO
89F with PMH baseline dementia, HTN, hypothyroidism, congenital single kidney, and autoimmune hepatitis, s/p left hip hemiarthroplasty for fracture on 11/16/20 then dc home from [facility] on 12/22/20, recently dc from [facility] for UTI on 1/9/21.   While at home daughter reports  that pt has been weaker and has had decreased ambulation. Dtr also reports increased confusion. Xray reveals r lung infiltrate. As per previous RD assessment (1/7/21) "Dtr reports pt's usual body weight is 140-145#, stated recent wt from rehab ~138" Admit weight 100# Weight hx per EMR: August 2020 151#, November 2020 130#, January 9th 2021 130.5# Admit weight 1/13/21 100#. Unlikely pt lost >30.0# in less than 1 week. Based on wt hx, it appears pt has been having a steady decline since August. Even if pt is now the same weight as 1/9/21 (130.5) then there is still a significant weight loss from August 2020 to now:(13% x 5 months) Based on Nutrition Focused  Physical Exam , pt found to have moderate temporal, orbital and buccal wasting as well as moderate wasting to chest, triceps and shoulders. Based on the aforementioned, pt meets criteria for moderate malnutrition in the context of chronic disease. Pt current diet rx Puree/Honey (SLP consult pending) and she is receiving Ensure pudding BID. As per RN, pt currently is eating well with assistance. At this time would continue current nutrition  POC.  Noted: Pt DNR and as per MD notes, consult for palliative MD pending. D/C  plan for home with home care or home hospice. 89F with PMH baseline dementia, HTN, hypothyroidism, congenital single kidney, and autoimmune hepatitis, s/p left hip hemiarthroplasty for fracture on 11/16/20 then dc home from [facility] on 12/22/20, recently dc from [facility] for UTI on 1/9/21.   While at home daughter reports  that pt has been weaker and has had decreased ambulation. Dtr also reports increased confusion. Pt dtr was apparently given pt thin liquids despite previous recommendation for Honey liquids by SLP: Xray reveals r lung infiltrate. As per previous RD assessment (1/7/21) "Dtr reports pt's usual body weight is 140-145#, stated recent wt from rehab ~138" Admit weight 100# Weight hx per EMR: August 2020 151#, November 2020 130#, January 9th 2021 130.5# Admit weight 1/13/21 100#. Unlikely pt lost >30.0# in less than 1 week. Based on wt hx, it appears pt has been having a steady decline since August. Even if pt is now the same weight as 1/9/21 (130.5) then there is still a significant weight loss from August 2020 to now:(13% x 5 months) Based on Nutrition Focused  Physical Exam , pt found to have moderate temporal, orbital and buccal wasting as well as moderate wasting to chest, triceps and shoulders. Based on the aforementioned, pt meets criteria for moderate malnutrition in the context of chronic disease. Pt current diet rx Puree/Honey (SLP consult pending) and she is receiving Ensure pudding BID. As per RN, pt currently is eating well with assistance. At this time would continue current nutrition  POC.  Noted: Pt DNR and as per MD notes, consult for palliative MD pending. D/C  plan for home with home care or home hospice.

## 2021-01-14 NOTE — PHARMACY COMMUNICATION NOTE - COMMENTS
ASP: restricted antibiotic   CrCl = 50.4ml/min  Cefepime 1gm IVPB q24h (renal dosing)  ID physician on case: Dr. Marilyn Navarrete

## 2021-01-14 NOTE — CONSULT NOTE ADULT - ATTENDING COMMENTS
Infectious Diseases will continue to follow. Please call with any questions.   Marilyn Navarrete M.D.  [facility], Division of Infectious Diseases [phone]  For over the weekend and after hours, please call [phone]

## 2021-01-14 NOTE — DIETITIAN NUTRITION RISK NOTIFICATION - TREATMENT: THE FOLLOWING DIET HAS BEEN RECOMMENDED
Diet, Dysphagia 1 Pureed-Honey Consistency Fluid:   Supplement Feeding Modality:  Oral  Ensure Enlive Servings Per Day:  1       Frequency:  Daily  Ensure Pudding Cans or Servings Per Day:  1       Frequency:  Daily (01-13-21 @ 16:22) [Active]        89F with PMH baseline dementia, HTN, hypothyroidism, congenital single kidney, and autoimmune hepatitis, s/p left hip hemiarthroplasty for fracture on 11/16/20 then dc home from [facility] on 12/22/20, recently dc from [facility] for UTI on 1/9/21.   While at home daughter reports  that pt has been weaker and has had decreased ambulation. Dtr also reports increased confusion.Pt dtr was apparently given pt thin liquids despite previous recommendation for Honey liquids by SLP:  Xray reveals r lung infiltrate. As per previous RD assessment (1/7/21) "Dtr reports pt's usual body weight is 140-145#, stated recent wt from rehab ~138" Admit weight 100# Weight hx per EMR: August 2020 151#, November 2020 130#, January 9th 2021 130.5# Admit weight 1/13/21 100#. Unlikely pt lost >30.0# in less than 1 week. Based on wt hx, it appears pt has been having a steady decline since August. Even if pt is now the same weight as 1/9/21 (130.5) then there is still a significant weight loss from August 2020 to now:(13% x 5 months) Based on Nutrition Focused  Physical Exam , pt found to have moderate temporal, orbital and buccal wasting as well as moderate wasting to chest, triceps and shoulders. Based on the aforementioned, pt meets criteria for moderate malnutrition in the context of chronic disease. Pt current diet rx Puree/Honey (SLP consult pending) and she is receiving Ensure pudding BID. As per RN, pt currently is eating well with assistance. At this time would continue current nutrition  POC.  Noted: Pt DNR and as per MD notes, consult for palliative MD pending. D/C  plan for home with home care or home hospice.  
Diet, Dysphagia 1 Pureed-Honey Consistency Fluid:   Supplement Feeding Modality:  Oral  Ensure Enlive Servings Per Day:  1       Frequency:  Daily  Ensure Pudding Cans or Servings Per Day:  1       Frequency:  Daily (01-13-21 @ 16:22) [Active]      89F with PMH baseline dementia, HTN, hypothyroidism, congenital single kidney, and autoimmune hepatitis, s/p left hip hemiarthroplasty for fracture on 11/16/20 then dc home from [facility] on 12/22/20, recently dc from [facility] for UTI on 1/9/21.   While at home daughter reports  that pt has been weaker and has had decreased ambulation. Dtr also reports increased confusion.Pt dtr was apparently given pt thin liquids despite previous recommendation for Honey liquids by SLP:  Xray reveals r lung infiltrate. As per previous RD assessment (1/7/21) "Dtr reports pt's usual body weight is 140-145#, stated recent wt from rehab ~138" Admit weight 100# Weight hx per EMR: August 2020 151#, November 2020 130#, January 9th 2021 130.5# Admit weight 1/13/21 100#. Unlikely pt lost >30.0# in less than 1 week. Based on wt hx, it appears pt has been having a steady decline since August. Even if pt is now the same weight as 1/9/21 (130.5) then there is still a significant weight loss from August 2020 to now:(13% x 5 months) Based on Nutrition Focused  Physical Exam , pt found to have moderate temporal, orbital and buccal wasting as well as moderate wasting to chest, triceps and shoulders. Based on the aforementioned, pt meets criteria for moderate malnutrition in the context of chronic disease. Pt current diet rx Puree/Honey (SLP consult pending) and she is receiving Ensure pudding BID. As per RN, pt currently is eating well with assistance. At this time would continue current nutrition  POC.  Noted: Pt DNR and as per MD notes, consult for palliative MD pending. D/C  plan for home with home care or home hospice.

## 2021-01-14 NOTE — SWALLOW BEDSIDE ASSESSMENT ADULT - SWALLOW EVAL: RECOMMENDED DIET
mich (dysphagia 1) with honey thick liquids, ALL LIQUIDS VIA TEASPOON; strict aspiration precautions

## 2021-01-14 NOTE — PHYSICAL THERAPY INITIAL EVALUATION ADULT - ADDITIONAL COMMENTS
Pt confused,, unable to provide social history. As per Care Coordinator notes, pt lives with daughter in a house. Steps unknown. Pt has a rolling walker, commode and a wheelchair at home.

## 2021-01-14 NOTE — SWALLOW BEDSIDE ASSESSMENT ADULT - SWALLOW EVAL: RECOMMENDED FEEDING/EATING TECHNIQUES
ALL LIQUIDS VIA TEASPOON/allow for swallow between intakes/alternate food with liquid/check mouth frequently for oral residue/pocketing/crush medication (when feasible)/hard swallow w/ each bite or sip/maintain upright posture during/after eating for 30 mins/no straws/oral hygiene/position upright (90 degrees)/small sips/bites

## 2021-01-14 NOTE — CONSULT NOTE ADULT - SUBJECTIVE AND OBJECTIVE BOX
[facility], Division of Infectious Diseases  BARBARA Vaughan, DWIGHT See  [phone]    JOESPH MAYBERRY  89y, Female  [id number]    HPI--  HPI:  89F with PMH baseline dementia, HTN, hypothyroidism, congenital single kidney, and autoimmune hepatitis, s/p left hip hemiarthroplasty for fracture on 20 then dc home from [facility] on 20, recently dc from [facility] for UTI on 21.   While at home daughter reports patient has been weaker overall, has needed increased assistance ambulating.  Has been even more confused.  Not eating but daughter has been forcing her to drink water (regular thin water).   Last 24 hours patient has felt warm and facial erythema.  She also noticed a cough.   She took her temp and it was normal but brought her to the ER anyway.    Daughter also reported patient has had some loose yellow stools.    CXR w/ RUL infiltrate  Given ceftriaxone/azithromycin in the ED.   Currently on cefepime  Pt seen and examined this AM. Sleeping comfortably. feeling tired.     Active Medications--  cefepime   IVPB 1000 milliGRAM(s) IV Intermittent every 24 hours  enoxaparin Injectable 40 milliGRAM(s) SubCutaneous daily  escitalopram 10 milliGRAM(s) Oral daily  lactated ringers. 1000 milliLiter(s) IV Continuous <Continuous>  levothyroxine 75 MICROGram(s) Oral daily  metoprolol succinate ER 50 milliGRAM(s) Oral daily  saccharomyces boulardii 250 milliGRAM(s) Oral two times a day    Antimicrobials:   cefepime   IVPB 1000 milliGRAM(s) IV Intermittent every 24 hours    Immunologic:     ROS:  CONSTITUTIONAL: No fevers or chills. No weakness or headache. No weight changes.  EYES/ENT: No visual or hearing changes. No sore throat or throat pain .  NECK: No pain or stiffness  RESPIRATORY: No cough, wheezing, or hemoptysis. No shortness of breath  CARDIOVASCULAR: No chest pain or palpitations  GASTROINTESTINAL: No abdominal pain. No nausea or vomiting. No diarrhea or constipation.  GENITOURINARY: No dysuria, frequency or hematuria  NEUROLOGICAL: No numbness or weakness  SKIN: No itching or rashes  PSYCHIATRIC: Pleasant. Appropriate affect    Allergies: latex (Rash)  penicillin (Rash)  sulfa drugs (Rash)    PMH -- Single kidney    Urinary tract infection    Autoimmune disease    Hepatitis    Hypothyroid    Essential hypertension      PSH -- History of repair of hip fracture    H/O abdominal hysterectomy    History of cholecystectomy    S/P appy      FH -- Family history of stroke    Family history of heart disease      Social History --  EtOH: denies   Tobacco: denies   Drug Use: denies     Travel/Environmental/Occupational History:    Physical Exam--  Vital Signs Last 24 Hrs  T(F): 99.8 (2021 05:44), Max: 100.4 (2021 11:52)  HR: 93 (2021 05:44) (65 - 93)  BP: 149/69 (2021 05:44) (120/56 - 187/78)  RR: 18 (2021 05:44) (15 - 24)  SpO2: 90% (2021 05:44) (90% - 100%)  General: nontoxic-appearing, no acute distress  HEENT: NC/AT, EOMI, anicteric, conjunctiva pink and moist, oropharynx clear, dentition fair  Neck: Not rigid. No sense of mass. No LAD  Lungs: Clear bilaterally without rales, wheezing or rhonchi  Heart: Regular rate and rhythm. No murmur, rub or gallop.  Abdomen: Soft. Nondistended. Nontender. Bowel sounds present. No organomegaly.  Back: No spinal tenderness. No costovertebral angle tenderness.  Extremities: No cyanosis or clubbing. No edema.   Skin: Warm. Dry. Good turgor. No rash. No vasculitic stigmata.    Laboratory & Imaging Data--  CBC:                       10.6   16.62 )-----------( 361      ( 2021 08:00 )             32.4     CMP:     136  |  100  |  10  ----------------------------<  108<H>  4.0   |  29  |  0.59    Ca    9.1      2021 12:28    TPro  7.9  /  Alb  2.2<L>  /  TBili  0.6  /  DBili  x   /  AST  30  /  ALT  25  /  AlkPhos  55  01-13    LIVER FUNCTIONS - ( 2021 12:28 )  Alb: 2.2 g/dL / Pro: 7.9 g/dL / ALK PHOS: 55 U/L / ALT: 25 U/L DA / AST: 30 U/L / GGT: x           Urinalysis Basic - ( 2021 13:17 )    Color: Yellow / Appearance: Slightly Turbid / S.010 / pH: x  Gluc: x / Ketone: Negative  / Bili: Negative / Urobili: Negative mg/dL   Blood: x / Protein: 15 mg/dL / Nitrite: Negative   Leuk Esterase: Trace / RBC: 3-5 /HPF / WBC 6-10   Sq Epi: x / Non Sq Epi: Moderate / Bacteria: Moderate        Microbiology: reviewed      Radiology: reviewed  < from: Xray Chest 1 View- PORTABLE-Urgent (21 @ 12:47) >    EXAM:  XR CHEST PORTABLE URGENT 1V                                  PROCEDURE DATE:  2021          INTERPRETATION:  AP chest on 2021 at 12:16 PM. Patient has sepsis.    Heart magnified by technique.    There is a right upper lobe infiltrate new since  of this year.    IMPRESSION: Right upper lobe infiltrate.              MARK CHAIDEZ M.D., ATTENDING RADIOLOGIST  This document has been electronically signed. 2021  1:47PM    < end of copied text >  < from: Xray Cinesophagram Swallow Function w/ Contrast (21 @ 10:18) >    EXAM:  XR SWAL FUNC KACIE VID CON STDY                                  PROCEDURE DATE:  2021          INTERPRETATION:  Clinical statement: Unable to swallow.    Technique: Swallowing function study.    Findings/  impression:  Examination was performed under fluoroscopic control. Detailed findings of the examination are described in the speech pathologist note.              ANGELA BAIG MD; Attending Radiologist  This document has been electronically signed. 2021 10:26AM    < end of copied text >

## 2021-01-15 DIAGNOSIS — A04.72 ENTEROCOLITIS DUE TO CLOSTRIDIUM DIFFICILE, NOT SPECIFIED AS RECURRENT: ICD-10-CM

## 2021-01-15 PROCEDURE — 99233 SBSQ HOSP IP/OBS HIGH 50: CPT

## 2021-01-15 RX ORDER — POTASSIUM CHLORIDE 20 MEQ
40 PACKET (EA) ORAL ONCE
Refills: 0 | Status: COMPLETED | OUTPATIENT
Start: 2021-01-15 | End: 2021-01-15

## 2021-01-15 RX ORDER — POTASSIUM CHLORIDE 20 MEQ
40 PACKET (EA) ORAL ONCE
Refills: 0 | Status: DISCONTINUED | OUTPATIENT
Start: 2021-01-15 | End: 2021-01-15

## 2021-01-15 RX ORDER — VANCOMYCIN HCL 1 G
125 VIAL (EA) INTRAVENOUS EVERY 6 HOURS
Refills: 0 | Status: DISCONTINUED | OUTPATIENT
Start: 2021-01-15 | End: 2021-01-18

## 2021-01-15 RX ADMIN — Medication 125 MILLIGRAM(S): at 00:37

## 2021-01-15 RX ADMIN — Medication 250 MILLIGRAM(S): at 18:13

## 2021-01-15 RX ADMIN — Medication 75 MICROGRAM(S): at 06:07

## 2021-01-15 RX ADMIN — Medication 125 MILLIGRAM(S): at 18:13

## 2021-01-15 RX ADMIN — ESCITALOPRAM OXALATE 10 MILLIGRAM(S): 10 TABLET, FILM COATED ORAL at 13:13

## 2021-01-15 RX ADMIN — Medication 50 MILLIGRAM(S): at 06:07

## 2021-01-15 RX ADMIN — CEFEPIME 100 MILLIGRAM(S): 1 INJECTION, POWDER, FOR SOLUTION INTRAMUSCULAR; INTRAVENOUS at 13:56

## 2021-01-15 RX ADMIN — ENOXAPARIN SODIUM 40 MILLIGRAM(S): 100 INJECTION SUBCUTANEOUS at 13:12

## 2021-01-15 RX ADMIN — Medication 125 MILLIGRAM(S): at 06:07

## 2021-01-15 RX ADMIN — Medication 125 MILLIGRAM(S): at 23:35

## 2021-01-15 RX ADMIN — Medication 40 MILLIEQUIVALENT(S): at 20:49

## 2021-01-15 RX ADMIN — Medication 125 MILLIGRAM(S): at 13:13

## 2021-01-15 RX ADMIN — Medication 250 MILLIGRAM(S): at 06:07

## 2021-01-15 NOTE — PROGRESS NOTE ADULT - PROBLEM SELECTOR PLAN 1
Aspiration precautions, puree diet with thick fluids, swallow re-eval  d/w Dr ZACHERY Navarrete - will place on IV cefepime for now and monitor.  f/u cultures.   also - urine with 6-10 wbc so new cultures sent

## 2021-01-15 NOTE — PROGRESS NOTE ADULT - SUBJECTIVE AND OBJECTIVE BOX
[facility], Division of Infectious Diseases  BARBARA Vaughan Y. Patel, S. Shah  [phone]    Name: JOESPH MAYBERRY  Age: 89y  Gender: Female  MRN: [id number]  Note Date: 01-15-21    Interval History:  Patient seen and examined at bedside this morning  No acute overnight events. Afebrile  Found to be C. diff+  Notes reviewed    Antibiotics:  cefepime   IVPB 1000 milliGRAM(s) IV Intermittent every 24 hours  vancomycin    Solution 125 milliGRAM(s) Oral every 6 hours      Medications:  cefepime   IVPB 1000 milliGRAM(s) IV Intermittent every 24 hours  enoxaparin Injectable 40 milliGRAM(s) SubCutaneous daily  escitalopram 10 milliGRAM(s) Oral daily  lactated ringers. 1000 milliLiter(s) IV Continuous <Continuous>  levothyroxine 75 MICROGram(s) Oral daily  metoprolol succinate ER 50 milliGRAM(s) Oral daily  saccharomyces boulardii 250 milliGRAM(s) Oral two times a day  vancomycin    Solution 125 milliGRAM(s) Oral every 6 hours      Review of Systems:  A 10-point review of systems was obtained.     Pertinent positives and negatives--  Constitutional: No fevers. No Chills. No Rigors.   Cardiovascular: No chest pain. No palpitations.  Respiratory: No shortness of breath. No cough.  Gastrointestinal: No nausea or vomiting. No diarrhea or constipation.   Psychiatric: Pleasant. Appropriate affect.    Review of systems otherwise negative except as previously noted.    Allergies: latex (Rash)  penicillin (Rash)  sulfa drugs (Rash)    For details regarding the patient's past medical history, social history, family history, and other miscellaneous elements, please refer the initial infectious diseases consultation and/or the admitting history and physical examination for this admission.    Objective:  Vitals:   T(C): 36.8 (01-15-21 @ 06:12), Max: 37.2 (01-15-21 @ 01:32)  HR: 72 (01-15-21 @ 06:12) (72 - 98)  BP: 155/78 (01-15-21 @ 06:12) (124/72 - 175/76)  RR: 17 (01-15-21 @ 06:12) (17 - 18)  SpO2: 95% (01-15-21 @ 06:12) (90% - 95%)    Physical Examination:  General: no acute distress  HEENT: NC/AT, EOMI, anicteric, no oral lesions  Neck: supple, no palpable LAD  Cardio: S1, S2 heard, RRR, no murmurs  Resp: breath sounds heard bilaterally, no rales, wheezes or rhonchi  Abd: soft, NT, ND, + bowel sounds  Neuro: no obvious focal deficits  Ext: no edema or cyanosis  Skin: warm, dry, no visible rash    Laboratory Studies:  CBC:                       10.6   16.62 )-----------( 361      ( 2021 08:00 )             32.4     CMP:     135  |  101  |  9   ----------------------------<  115<H>  3.3<L>   |  26  |  0.54    Ca    8.7      2021 08:00  Mg     1.7         TPro  7.9  /  Alb  2.2<L>  /  TBili  0.6  /  DBili  x   /  AST  30  /  ALT  25  /  AlkPhos  55      LIVER FUNCTIONS - ( 2021 12:28 )  Alb: 2.2 g/dL / Pro: 7.9 g/dL / ALK PHOS: 55 U/L / ALT: 25 U/L DA / AST: 30 U/L / GGT: x           Urinalysis Basic - ( 2021 13:17 )    Color: Yellow / Appearance: Slightly Turbid / S.010 / pH: x  Gluc: x / Ketone: Negative  / Bili: Negative / Urobili: Negative mg/dL   Blood: x / Protein: 15 mg/dL / Nitrite: Negative   Leuk Esterase: Trace / RBC: 3-5 /HPF / WBC 6-10   Sq Epi: x / Non Sq Epi: Moderate / Bacteria: Moderate      Microbiology: reviewed    Culture - Blood (collected 21 @ 18:55)  Source: .Blood Blood-Peripheral  Preliminary Report (21 @ 19:01):    No growth to date.    Culture - Blood (collected 21 @ 18:55)  Source: .Blood Blood-Peripheral  Preliminary Report (21 @ 19:01):    No growth to date.        Radiology: reviewed

## 2021-01-15 NOTE — PROGRESS NOTE ADULT - SUBJECTIVE AND OBJECTIVE BOX
Patient is a 89y old  Female who presents with a chief complaint of PNA  Diarrhea (15 Joe 2021 08:08)    INTERVAL HPI/OVERNIGHT EVENTS: awake and alert, no complaints.    MEDICATIONS  (STANDING):  cefepime   IVPB 1000 milliGRAM(s) IV Intermittent every 24 hours  enoxaparin Injectable 40 milliGRAM(s) SubCutaneous daily  escitalopram 10 milliGRAM(s) Oral daily  lactated ringers. 1000 milliLiter(s) (75 mL/Hr) IV Continuous <Continuous>  levothyroxine 75 MICROGram(s) Oral daily  metoprolol succinate ER 50 milliGRAM(s) Oral daily  saccharomyces boulardii 250 milliGRAM(s) Oral two times a day  vancomycin    Solution 125 milliGRAM(s) Oral every 6 hours    MEDICATIONS  (PRN):      Allergies  latex (Rash)  penicillin (Rash)  sulfa drugs (Rash)    REVIEW OF SYSTEMS:  unable due to dementia.     Vital Signs Last 24 Hrs  T(C): 37.2 (15 Joe 2021 10:00), Max: 37.2 (15 Jeo 2021 01:32)  T(F): 98.9 (15 Joe 2021 10:00), Max: 99 (15 Joe 2021 01:32)  HR: 89 (15 Joe 2021 10:00) (72 - 90)  BP: 161/71 (15 Joe 2021 10:00) (127/73 - 175/76)  BP(mean): --  RR: 17 (15 Joe 2021 10:00) (17 - 18)  SpO2: 95% (15 Joe 2021 10:00) (93% - 95%)    PHYSICAL EXAM:  GENERAL: NAD, well-groomed, well-developed  HEAD:  Atraumatic, Normocephalic  EYES:  conjunctiva and sclera clear  ENMT: Moist mucous membranes  NECK: Supple, No JVD  NERVOUS SYSTEM:  Awake and alert, moving all extremities.   CHEST/LUNG: Clear to auscultation bilaterally;   HEART: Regular rate and rhythm;   ABDOMEN: Soft, Nontender, Nondistended;   EXTREMITIES:  2+ Peripheral Pulses, No clubbing, cyanosis, or edema      LABS:      Ca    8.7        2021 08:00    PT/INR - ( 2021 12:28 )   PT: 14.8 sec;   INR: 1.24 ratio    PTT - ( 2021 12:28 )  PTT:26.2 sec    Urinalysis Basic - ( 2021 13:17 )  Color: Yellow / Appearance: Slightly Turbid / S.010 / pH: x  Gluc: x / Ketone: Negative  / Bili: Negative / Urobili: Negative mg/dL   Blood: x / Protein: 15 mg/dL / Nitrite: Negative   Leuk Esterase: Trace / RBC: 3-5 /HPF / WBC 6-10   Sq Epi: x / Non Sq Epi: Moderate / Bacteria: Moderate      CAPILLARY BLOOD GLUCOSE          RADIOLOGY & ADDITIONAL TESTS:    Imaging Personally Reviewed:  [ ] YES     Consultant(s) Notes Reviewed:      Care Discussed with Consultants/Other Providers:    Advanced Directives: [ ] DNR  [ ] No feeding tube  [ ] MOLST in chart  [ ] MOLST completed today  [ ] Unknown

## 2021-01-15 NOTE — PROGRESS NOTE ADULT - ATTENDING COMMENTS
Infectious Diseases will continue to follow. Please call with any questions.   Marilyn Navarrete M.D.  [facility], Division of Infectious Diseases [phone]  For over the weekend and after hours, please call [phone]

## 2021-01-15 NOTE — PROGRESS NOTE ADULT - ASSESSMENT
Pt is 89W w/ PMHx of dementia, HTN, hypothyroidism, congenital single kidney, and autoimmune hepatitis, s/p left hip hemiarthroplasty for fracture on 11/16/20, recent admission for UTI now p/w weakness     Aspiration PNA vs. HAP  Leukocytosis  Given recent hospitalization and admission to rehab, would opt to cover her for noscomial organisms  Imaging reviewed, CXR w/ RUL infiltrate  -flu/RSV/COVID negative  -pending BCx x2 and UCx  -sending uPNA Ag  -trend fevers/leukocytosis  -maintain aspiration precautions  -c/w cefepime 1gm q24h, will likely need 5 day course    C. Diff Infection  Leukocytosis  Diarrhea  -c. diff positive  -leukocytosis continues to uptrend 12 --> 16, should improve while in treatment  -c/w vancomycin 125mg PO q6h x10 day course    CKD   CrCL ~18  renally dose medications

## 2021-01-16 LAB
-  AMPICILLIN: SIGNIFICANT CHANGE UP
-  CIPROFLOXACIN: SIGNIFICANT CHANGE UP
-  LEVOFLOXACIN: SIGNIFICANT CHANGE UP
-  NITROFURANTOIN: SIGNIFICANT CHANGE UP
-  TETRACYCLINE: SIGNIFICANT CHANGE UP
-  VANCOMYCIN: SIGNIFICANT CHANGE UP
ANION GAP SERPL CALC-SCNC: 6 MMOL/L — SIGNIFICANT CHANGE UP (ref 5–17)
BUN SERPL-MCNC: 12 MG/DL — SIGNIFICANT CHANGE UP (ref 7–23)
CALCIUM SERPL-MCNC: 8.6 MG/DL — SIGNIFICANT CHANGE UP (ref 8.4–10.5)
CHLORIDE SERPL-SCNC: 102 MMOL/L — SIGNIFICANT CHANGE UP (ref 96–108)
CO2 SERPL-SCNC: 26 MMOL/L — SIGNIFICANT CHANGE UP (ref 22–31)
CREAT SERPL-MCNC: 0.55 MG/DL — SIGNIFICANT CHANGE UP (ref 0.5–1.3)
CULTURE RESULTS: SIGNIFICANT CHANGE UP
GLUCOSE SERPL-MCNC: 104 MG/DL — HIGH (ref 70–99)
HCT VFR BLD CALC: 30.8 % — LOW (ref 34.5–45)
HGB BLD-MCNC: 10 G/DL — LOW (ref 11.5–15.5)
MAGNESIUM SERPL-MCNC: 1.5 MG/DL — LOW (ref 1.6–2.6)
MCHC RBC-ENTMCNC: 28.2 PG — SIGNIFICANT CHANGE UP (ref 27–34)
MCHC RBC-ENTMCNC: 32.5 GM/DL — SIGNIFICANT CHANGE UP (ref 32–36)
MCV RBC AUTO: 87 FL — SIGNIFICANT CHANGE UP (ref 80–100)
METHOD TYPE: SIGNIFICANT CHANGE UP
NRBC # BLD: 0 /100 WBCS — SIGNIFICANT CHANGE UP (ref 0–0)
ORGANISM # SPEC MICROSCOPIC CNT: SIGNIFICANT CHANGE UP
ORGANISM # SPEC MICROSCOPIC CNT: SIGNIFICANT CHANGE UP
PLATELET # BLD AUTO: 328 K/UL — SIGNIFICANT CHANGE UP (ref 150–400)
POTASSIUM SERPL-MCNC: 4 MMOL/L — SIGNIFICANT CHANGE UP (ref 3.5–5.3)
POTASSIUM SERPL-SCNC: 4 MMOL/L — SIGNIFICANT CHANGE UP (ref 3.5–5.3)
RBC # BLD: 3.54 M/UL — LOW (ref 3.8–5.2)
RBC # FLD: 14.2 % — SIGNIFICANT CHANGE UP (ref 10.3–14.5)
SODIUM SERPL-SCNC: 134 MMOL/L — LOW (ref 135–145)
SPECIMEN SOURCE: SIGNIFICANT CHANGE UP
WBC # BLD: 13.31 K/UL — HIGH (ref 3.8–10.5)
WBC # FLD AUTO: 13.31 K/UL — HIGH (ref 3.8–10.5)

## 2021-01-16 PROCEDURE — 99233 SBSQ HOSP IP/OBS HIGH 50: CPT

## 2021-01-16 RX ORDER — MAGNESIUM SULFATE 500 MG/ML
1 VIAL (ML) INJECTION ONCE
Refills: 0 | Status: COMPLETED | OUTPATIENT
Start: 2021-01-16 | End: 2021-01-16

## 2021-01-16 RX ADMIN — ESCITALOPRAM OXALATE 10 MILLIGRAM(S): 10 TABLET, FILM COATED ORAL at 11:11

## 2021-01-16 RX ADMIN — Medication 75 MICROGRAM(S): at 06:03

## 2021-01-16 RX ADMIN — Medication 250 MILLIGRAM(S): at 17:24

## 2021-01-16 RX ADMIN — Medication 100 GRAM(S): at 11:12

## 2021-01-16 RX ADMIN — ENOXAPARIN SODIUM 40 MILLIGRAM(S): 100 INJECTION SUBCUTANEOUS at 11:11

## 2021-01-16 RX ADMIN — Medication 125 MILLIGRAM(S): at 06:03

## 2021-01-16 RX ADMIN — CEFEPIME 100 MILLIGRAM(S): 1 INJECTION, POWDER, FOR SOLUTION INTRAMUSCULAR; INTRAVENOUS at 13:25

## 2021-01-16 RX ADMIN — Medication 250 MILLIGRAM(S): at 06:03

## 2021-01-16 RX ADMIN — Medication 50 MILLIGRAM(S): at 06:03

## 2021-01-16 RX ADMIN — Medication 125 MILLIGRAM(S): at 23:22

## 2021-01-16 RX ADMIN — Medication 125 MILLIGRAM(S): at 11:11

## 2021-01-16 RX ADMIN — Medication 125 MILLIGRAM(S): at 17:24

## 2021-01-16 NOTE — PROGRESS NOTE ADULT - SUBJECTIVE AND OBJECTIVE BOX
[facility], Division of Infectious Diseases  BARBARA Vaughan Y. Patel, S. Shah  [phone]    Name: JOESPH MAYBERRY  Age: 89y  Gender: Female  MRN: [id number]  Note Date: 01-16-21    Interval History:  Patient seen and examined at bedside this morning  No acute overnight events. Afebrile overnight  Sleeping comfortably  Notes reviewed    Antibiotics:  cefepime   IVPB 1000 milliGRAM(s) IV Intermittent every 24 hours  vancomycin    Solution 125 milliGRAM(s) Oral every 6 hours      Medications:  cefepime   IVPB 1000 milliGRAM(s) IV Intermittent every 24 hours  enoxaparin Injectable 40 milliGRAM(s) SubCutaneous daily  escitalopram 10 milliGRAM(s) Oral daily  lactated ringers. 1000 milliLiter(s) IV Continuous <Continuous>  levothyroxine 75 MICROGram(s) Oral daily  magnesium sulfate  IVPB 1 Gram(s) IV Intermittent once  metoprolol succinate ER 50 milliGRAM(s) Oral daily  saccharomyces boulardii 250 milliGRAM(s) Oral two times a day  vancomycin    Solution 125 milliGRAM(s) Oral every 6 hours      Review of Systems:  A 10-point review of systems was obtained.     Pertinent positives and negatives--  Constitutional: No fevers. No Chills. No Rigors.   Cardiovascular: No chest pain. No palpitations.  Respiratory: No shortness of breath. No cough.  Gastrointestinal: No nausea or vomiting. No diarrhea or constipation.   Psychiatric: Pleasant. Appropriate affect.    Review of systems otherwise negative except as previously noted.    Allergies: latex (Rash)  penicillin (Rash)  sulfa drugs (Rash)    For details regarding the patient's past medical history, social history, family history, and other miscellaneous elements, please refer the initial infectious diseases consultation and/or the admitting history and physical examination for this admission.    Objective:  Vitals:   T(C): 37.3 (01-16-21 @ 04:55), Max: 37.9 (01-15-21 @ 22:28)  HR: 85 (01-16-21 @ 04:55) (84 - 99)  BP: 160/79 (01-16-21 @ 04:55) (150/68 - 172/77)  RR: 18 (01-16-21 @ 04:55) (17 - 18)  SpO2: 93% (01-16-21 @ 04:55) (89% - 97%)    Physical Examination:  General: no acute distress  HEENT: NC/AT, EOMI, anicteric, no oral lesions  Neck: supple, no palpable LAD  Cardio: S1, S2 heard, RRR, no murmurs  Resp: breath sounds heard bilaterally, no rales, wheezes or rhonchi  Abd: soft, NT, ND, + bowel sounds  Neuro: no obvious focal deficits  Ext: no edema or cyanosis  Skin: warm, dry, no visible rash    Laboratory Studies:  CBC:                       10.0   13.31 )-----------( 328      ( 16 Jan 2021 06:41 )             30.8     CMP: 01-16    134<L>  |  102  |  12  ----------------------------<  104<H>  4.0   |  26  |  0.55    Ca    8.6      16 Jan 2021 06:41  Mg     1.5     01-16          Microbiology: reviewed    Culture - Urine (collected 01-13-21 @ 19:04)  Source: .Urine Clean Catch (Midstream)  Preliminary Report (01-15-21 @ 09:40):    50,000 - 99,000 CFU/mL Gram positive organisms    Culture - Blood (collected 01-13-21 @ 18:55)  Source: .Blood Blood-Peripheral  Preliminary Report (01-14-21 @ 19:01):    No growth to date.    Culture - Blood (collected 01-13-21 @ 18:55)  Source: .Blood Blood-Peripheral  Preliminary Report (01-14-21 @ 19:01):    No growth to date.        Radiology: reviewed

## 2021-01-16 NOTE — PROGRESS NOTE ADULT - ASSESSMENT
89F with PMH baseline dementia, HTN, hypothyroidism, congenital single kidney, and autoimmune hepatitis, s/p left hip hemiarthroplasty for fracture on 11/16/20, recent UTI now with pneumonia, possible aspiration and C diff infection

## 2021-01-16 NOTE — PROGRESS NOTE ADULT - PROBLEM SELECTOR PLAN 4
continue with toprol continue with toprol  -discussed starting Norvasc 2.5mg daily with daughter, but patients daughter would prefer not starting another medication. BP remains elevated.   -continue to monitor.

## 2021-01-16 NOTE — PROGRESS NOTE ADULT - ATTENDING COMMENTS
UPdated RN Umu regarding Plan of Care.   Updated Daughter Amina Ashford via phone. All questions answered.

## 2021-01-16 NOTE — PROGRESS NOTE ADULT - SUBJECTIVE AND OBJECTIVE BOX
Pt seen and examined at bedside. She has no complaints. She is A and O x3, tolerating PO intake.   Denies headaches, nausea, vomiting, chest pain, SOB, palpitations, abdominal pain, constipation, melena, hematochezia, dysuria.   She continues to have diarrhea but improved.   No acute overnight events.     T(C): 37.3 (01-16-21 @ 04:55), Max: 37.9 (01-15-21 @ 22:28)  HR: 85 (01-16-21 @ 04:55) (84 - 99)  BP: 160/79 (01-16-21 @ 04:55) (150/68 - 172/77)  RR: 18 (01-16-21 @ 04:55) (17 - 18)  SpO2: 93% (01-16-21 @ 04:55) (89% - 97%)  Wt(kg): --    Physical Exam:   GENERAL: well-groomed, well-developed, NAD  HEENT: head NC/AT; EOM intact, conjunctiva & sclera clear; hearing grossly intact, moist mucous membranes  NECK: supple, no JVD  RESPIRATORY: CTA B/L, no wheezing, rales, rhonchi or rubs  CARDIOVASCULAR: S1&S2, RRR, no murmurs or gallops  ABDOMEN: soft, non-tender, non-distended, + Bowel sounds x4 quadrants, no guarding, rebound or rigidity  MUSCULOSKELETAL:  no clubbing, cyanosis or edema of all 4 extremities  LYMPH: no cervical lymphadenopathy  VASCULAR: Radial pulses 2+ bilaterally, no varicose veins   SKIN: warm and dry, color normal  NEUROLOGIC: AA&O X3, CN2-12 intact w/ no focal deficits, no sensory loss, motor Strength 4/5 in UE & LE B/L  Psych: Normal mood and affect, normal behavior       Pt seen and examined at bedside. She has no complaints. She is A and O x2, tolerating PO intake.   Denies headaches, nausea, vomiting, chest pain, SOB, palpitations, abdominal pain, constipation, melena, hematochezia, dysuria.   She continues to have diarrhea but improved.   No acute overnight events.     T(C): 37.3 (01-16-21 @ 04:55), Max: 37.9 (01-15-21 @ 22:28)  HR: 85 (01-16-21 @ 04:55) (84 - 99)  BP: 160/79 (01-16-21 @ 04:55) (150/68 - 172/77)  RR: 18 (01-16-21 @ 04:55) (17 - 18)  SpO2: 93% (01-16-21 @ 04:55) (89% - 97%)  Wt(kg): --    Physical Exam:   GENERAL: well-groomed, well-developed, NAD  HEENT: head NC/AT; EOM intact, conjunctiva & sclera clear; hearing grossly intact, moist mucous membranes  NECK: supple, no JVD  RESPIRATORY: CTA B/L, no wheezing, rales, rhonchi or rubs  CARDIOVASCULAR: S1&S2, RRR, no murmurs or gallops  ABDOMEN: soft, non-tender, non-distended, + Bowel sounds x4 quadrants, no guarding, rebound or rigidity  MUSCULOSKELETAL:  no clubbing, cyanosis or edema of all 4 extremities  LYMPH: no cervical lymphadenopathy  VASCULAR: Radial pulses 2+ bilaterally, no varicose veins   SKIN: warm and dry, color normal  NEUROLOGIC: AA&O X2 [person and place], CN2-12 intact w/ no focal deficits, no sensory loss, motor Strength 4/5 in UE & LE B/L  Psych: Normal mood and affect, normal behavior

## 2021-01-16 NOTE — PROGRESS NOTE ADULT - PROBLEM SELECTOR PLAN 2
started on Vanco PO  monitor leukocytosis started on Vanco PO. Continue Florastor  monitor leukocytosis started on Vanco PO. Continue Florastor  monitor leukocytosis  has anemia but no signs or symptoms of active bleeding. Continue to monitor hgb.

## 2021-01-16 NOTE — PROGRESS NOTE ADULT - ASSESSMENT
Pt is 89W w/ PMHx of dementia, HTN, hypothyroidism, congenital single kidney, and autoimmune hepatitis, s/p left hip hemiarthroplasty for fracture on 11/16/20, recent admission for UTI now p/w weakness     Aspiration PNA vs. HAP  Leukocytosis--now downtrending  Given recent hospitalization and admission to rehab, would opt to cover her for nosocomial organisms  Imaging reviewed, CXR w/ RUL infiltrate  -flu/RSV/COVID negative  -trend fevers/leukocytosis  --leukocytosis now downtrending   -maintain aspiration precautions  -c/w cefepime 1gm q24h for total 5 day course until 1/18/2021    Acute cystitis/UTI  -UA?+, but UCx +GPC--given that this grew in the presence of cefepime; this is likely enterococcus as cephalosporins have no activity against enterococcal species  -f/u final UCx  -adding amoxicillin 500mg q24h to regimen to cover for presumed organism x5 day course for now, will use final cx results to guide therapy    C. Diff Infection  Diarrhea  -c. diff positive  -c/w vancomycin 125mg PO q6h x10 day course until 1/24/2021    CKD   CrCL ~18  renally dose medications Pt is 89W w/ PMHx of dementia, HTN, hypothyroidism, congenital single kidney, and autoimmune hepatitis, s/p left hip hemiarthroplasty for fracture on 11/16/20, recent admission for UTI now p/w weakness     Aspiration PNA vs. HAP  Leukocytosis--now downtrending  Given recent hospitalization and admission to rehab, would opt to cover her for nosocomial organisms  Imaging reviewed, CXR w/ RUL infiltrate  -flu/RSV/COVID negative  -trend fevers/leukocytosis  --leukocytosis now downtrending   -maintain aspiration precautions  -c/w cefepime 1gm q24h for total 5 day course until 1/18/2021    Acute cystitis/UTI  -UA?+, but UCx +GPC--given that this grew in the presence of cefepime; this is likely enterococcus as cephalosporins have no activity against enterococcal species  -f/u final UCx to help guide therapy.   Enterococcal species usually susceptible to penicillins however pt w/ allergy. Holding other options such as FQ or vancomycin given renal fxn    C. Diff Infection  Diarrhea  -c. diff positive  -c/w vancomycin 125mg PO q6h x10 day course until 1/24/2021    CKD   CrCL ~18  renally dose medications

## 2021-01-16 NOTE — PROGRESS NOTE ADULT - PROBLEM SELECTOR PLAN 1
Continue with Cefepime.   - Continue with Cefepime. CXR reviewed RUL infiltrate  -blood cultures with no growth to date  -Urine CUlture with 50-99k gram positive organisms  -WBC count trending downward Continue with Cefepime. CXR reviewed RUL infiltrate  -blood cultures with no growth to date  -Urine CUlture with 50-99k gram positive organisms  -WBC count trending downward. Afebrile Continue with Cefepime. CXR reviewed RUL infiltrate  -blood cultures with no growth to date  -Urine CUlture with 50-99k gram positive organisms-f/u ID rec's  -WBC count trending downward. Afebrile

## 2021-01-16 NOTE — PROGRESS NOTE ADULT - ATTENDING COMMENTS
Infectious Diseases will continue to follow. Please call with any questions.   Marilyn Navarrete M.D.  [facility], Division of Infectious Diseases [phone]  For over the weekend and after hours, please call [phone]  [facility] and ID attending Dr. Prieto's service through the long weekend 1/16-1/18

## 2021-01-17 LAB
ANION GAP SERPL CALC-SCNC: 7 MMOL/L — SIGNIFICANT CHANGE UP (ref 5–17)
BASOPHILS # BLD AUTO: 0.05 K/UL — SIGNIFICANT CHANGE UP (ref 0–0.2)
BASOPHILS NFR BLD AUTO: 0.4 % — SIGNIFICANT CHANGE UP (ref 0–2)
BUN SERPL-MCNC: 11 MG/DL — SIGNIFICANT CHANGE UP (ref 7–23)
CALCIUM SERPL-MCNC: 8.4 MG/DL — SIGNIFICANT CHANGE UP (ref 8.4–10.5)
CHLORIDE SERPL-SCNC: 102 MMOL/L — SIGNIFICANT CHANGE UP (ref 96–108)
CO2 SERPL-SCNC: 28 MMOL/L — SIGNIFICANT CHANGE UP (ref 22–31)
CREAT SERPL-MCNC: 0.59 MG/DL — SIGNIFICANT CHANGE UP (ref 0.5–1.3)
EOSINOPHIL # BLD AUTO: 0.41 K/UL — SIGNIFICANT CHANGE UP (ref 0–0.5)
EOSINOPHIL NFR BLD AUTO: 3.4 % — SIGNIFICANT CHANGE UP (ref 0–6)
GLUCOSE SERPL-MCNC: 101 MG/DL — HIGH (ref 70–99)
HCT VFR BLD CALC: 30.5 % — LOW (ref 34.5–45)
HGB BLD-MCNC: 9.7 G/DL — LOW (ref 11.5–15.5)
IMM GRANULOCYTES NFR BLD AUTO: 0.3 % — SIGNIFICANT CHANGE UP (ref 0–1.5)
LYMPHOCYTES # BLD AUTO: 2.47 K/UL — SIGNIFICANT CHANGE UP (ref 1–3.3)
LYMPHOCYTES # BLD AUTO: 20.7 % — SIGNIFICANT CHANGE UP (ref 13–44)
MAGNESIUM SERPL-MCNC: 2.1 MG/DL — SIGNIFICANT CHANGE UP (ref 1.6–2.6)
MCHC RBC-ENTMCNC: 27.6 PG — SIGNIFICANT CHANGE UP (ref 27–34)
MCHC RBC-ENTMCNC: 31.8 GM/DL — LOW (ref 32–36)
MCV RBC AUTO: 86.9 FL — SIGNIFICANT CHANGE UP (ref 80–100)
MONOCYTES # BLD AUTO: 1.29 K/UL — HIGH (ref 0–0.9)
MONOCYTES NFR BLD AUTO: 10.8 % — SIGNIFICANT CHANGE UP (ref 2–14)
NEUTROPHILS # BLD AUTO: 7.7 K/UL — HIGH (ref 1.8–7.4)
NEUTROPHILS NFR BLD AUTO: 64.4 % — SIGNIFICANT CHANGE UP (ref 43–77)
NRBC # BLD: 0 /100 WBCS — SIGNIFICANT CHANGE UP (ref 0–0)
PLATELET # BLD AUTO: 317 K/UL — SIGNIFICANT CHANGE UP (ref 150–400)
POTASSIUM SERPL-MCNC: 4.1 MMOL/L — SIGNIFICANT CHANGE UP (ref 3.5–5.3)
POTASSIUM SERPL-SCNC: 4.1 MMOL/L — SIGNIFICANT CHANGE UP (ref 3.5–5.3)
RBC # BLD: 3.51 M/UL — LOW (ref 3.8–5.2)
RBC # FLD: 14.3 % — SIGNIFICANT CHANGE UP (ref 10.3–14.5)
SARS-COV-2 RNA SPEC QL NAA+PROBE: SIGNIFICANT CHANGE UP
SODIUM SERPL-SCNC: 137 MMOL/L — SIGNIFICANT CHANGE UP (ref 135–145)
WBC # BLD: 11.95 K/UL — HIGH (ref 3.8–10.5)
WBC # FLD AUTO: 11.95 K/UL — HIGH (ref 3.8–10.5)

## 2021-01-17 PROCEDURE — 99232 SBSQ HOSP IP/OBS MODERATE 35: CPT

## 2021-01-17 RX ADMIN — Medication 125 MILLIGRAM(S): at 23:55

## 2021-01-17 RX ADMIN — Medication 250 MILLIGRAM(S): at 06:26

## 2021-01-17 RX ADMIN — Medication 50 MILLIGRAM(S): at 06:26

## 2021-01-17 RX ADMIN — Medication 75 MICROGRAM(S): at 06:26

## 2021-01-17 RX ADMIN — Medication 125 MILLIGRAM(S): at 17:20

## 2021-01-17 RX ADMIN — Medication 125 MILLIGRAM(S): at 06:26

## 2021-01-17 RX ADMIN — Medication 125 MILLIGRAM(S): at 12:28

## 2021-01-17 RX ADMIN — Medication 250 MILLIGRAM(S): at 17:20

## 2021-01-17 RX ADMIN — ENOXAPARIN SODIUM 40 MILLIGRAM(S): 100 INJECTION SUBCUTANEOUS at 12:28

## 2021-01-17 RX ADMIN — ESCITALOPRAM OXALATE 10 MILLIGRAM(S): 10 TABLET, FILM COATED ORAL at 12:28

## 2021-01-17 NOTE — PROGRESS NOTE ADULT - SUBJECTIVE AND OBJECTIVE BOX
[facility], Division of Infectious Diseases  BARBARA Vaughan Y. Patel, S. Shah  [phone]    Name: JOESPH MAYBERRY  Age: 89y  Gender: Female  MRN: [id number]  Note Date: 01-17-21    Interval History:  Patient seen and examined at bedside this morning  Sleeping comfortably  No acute overnight events. Afebrile overnight  Notes reviewed    Antibiotics:  cefepime   IVPB 1000 milliGRAM(s) IV Intermittent every 24 hours  vancomycin    Solution 125 milliGRAM(s) Oral every 6 hours      Medications:  cefepime   IVPB 1000 milliGRAM(s) IV Intermittent every 24 hours  enoxaparin Injectable 40 milliGRAM(s) SubCutaneous daily  escitalopram 10 milliGRAM(s) Oral daily  lactated ringers. 1000 milliLiter(s) IV Continuous <Continuous>  levothyroxine 75 MICROGram(s) Oral daily  metoprolol succinate ER 50 milliGRAM(s) Oral daily  saccharomyces boulardii 250 milliGRAM(s) Oral two times a day  vancomycin    Solution 125 milliGRAM(s) Oral every 6 hours      Review of Systems:  A 10-point review of systems was obtained.     Pertinent positives and negatives--  Constitutional: No fevers. No Chills. No Rigors.   Cardiovascular: No chest pain. No palpitations.  Respiratory: No shortness of breath. No cough.  Gastrointestinal: No nausea or vomiting. No diarrhea or constipation.   Psychiatric: Pleasant. Appropriate affect.    Review of systems otherwise negative except as previously noted.    Allergies: latex (Rash)  penicillin (Rash)  sulfa drugs (Rash)    For details regarding the patient's past medical history, social history, family history, and other miscellaneous elements, please refer the initial infectious diseases consultation and/or the admitting history and physical examination for this admission.    Objective:  Vitals:   T(C): 36.4 (01-17-21 @ 05:09), Max: 37.7 (01-16-21 @ 17:26)  HR: 89 (01-17-21 @ 05:09) (64 - 102)  BP: 160/71 (01-17-21 @ 05:09) (138/74 - 161/62)  RR: 16 (01-17-21 @ 05:09) (16 - 20)  SpO2: 94% (01-17-21 @ 05:09) (94% - 98%)    Physical Examination:  General: no acute distress  HEENT: NC/AT, EOMI, anicteric, no oral lesions  Neck: supple, no palpable LAD  Cardio: S1, S2 heard, RRR, no murmurs  Resp: breath sounds heard bilaterally, no rales, wheezes or rhonchi  Abd: soft, NT, ND, + bowel sounds  Neuro: no obvious focal deficits  Ext: no edema or cyanosis  Skin: warm, dry, no visible rash      Laboratory Studies:  CBC:                       9.7    11.95 )-----------( 317      ( 17 Jan 2021 06:06 )             30.5     CMP: 01-17    137  |  102  |  11  ----------------------------<  101<H>  4.1   |  28  |  0.59    Ca    8.4      17 Jan 2021 06:06  Mg     2.1     01-17          Microbiology: reviewed    Culture - Urine (collected 01-13-21 @ 19:04)  Source: .Urine Clean Catch (Midstream)  Final Report (01-16-21 @ 13:11):    50,000 - 99,000 CFU/mL Enterococcus faecalis  Organism: Enterococcus faecalis (01-16-21 @ 13:11)  Organism: Enterococcus faecalis (01-16-21 @ 13:11)      -  Ampicillin: S <=2 Predicts results to ampicillin/sulbactam, amoxacillin-clavulanate and  piperacillin-tazobactam.      -  Ciprofloxacin: I 2      -  Levofloxacin: S 2      -  Nitrofurantoin: S <=32 Should not be used to treat pyelonephritis.      -  Tetra/Doxy: S <=4      -  Vancomycin: S 2      Method Type: SAMMIE    Culture - Blood (collected 01-13-21 @ 18:55)  Source: .Blood Blood-Peripheral  Preliminary Report (01-14-21 @ 19:01):    No growth to date.    Culture - Blood (collected 01-13-21 @ 18:55)  Source: .Blood Blood-Peripheral  Preliminary Report (01-14-21 @ 19:01):    No growth to date.        Radiology: reviewed

## 2021-01-17 NOTE — PROGRESS NOTE ADULT - SUBJECTIVE AND OBJECTIVE BOX
Patient seen and examined at bedside. SHe is tolerating PO intake. 2 small loose formed stools over the last 24h. No melena or hematochezia. Has no other complaints.   Denies headaches, nausea, vomiting, chest pain, SOB, palpitations, abdominal pain, constipation, dysuria.     T(C): 36.4 (01-17-21 @ 05:09), Max: 37.7 (01-16-21 @ 17:26)  HR: 89 (01-17-21 @ 05:09) (64 - 102)  BP: 160/71 (01-17-21 @ 05:09) (138/74 - 161/62)  RR: 16 (01-17-21 @ 05:09) (16 - 20)  SpO2: 94% (01-17-21 @ 05:09) (94% - 98%)  Wt(kg): --    Physical Exam:   GENERAL: well-groomed, well-developed, NAD  HEENT: head NC/AT; EOM intact, conjunctiva & sclera clear; hearing grossly intact, moist mucous membranes  NECK: supple, no JVD  RESPIRATORY: CTA B/L, no wheezing, rales, rhonchi or rubs  CARDIOVASCULAR: S1&S2, RRR, no murmurs or gallops  ABDOMEN: soft, non-tender, non-distended, + Bowel sounds x4 quadrants, no guarding, rebound or rigidity  MUSCULOSKELETAL:  no clubbing, cyanosis or edema of all 4 extremities  LYMPH: no cervical lymphadenopathy  VASCULAR: Radial pulses 2+ bilaterally, no varicose veins   SKIN: warm and dry, color normal  NEUROLOGIC: AA&O X2, CN2-12 intact w/ no focal deficits, no sensory loss, motor Strength 4/5 in UE & LE B/L  Psych: Normal mood and affect, normal behavior

## 2021-01-17 NOTE — CHART NOTE - NSCHARTNOTEFT_GEN_A_CORE
Assessment: 88 yo female seen for nutrition F/U; patient has varied PO intake (%) of puree consistency diet with honey liquids; requires spoon-feeding due to impaired cognition; patient on contact precautions due to + C-diff (with vanco for the same); monitored for loose stools; oral supplements of Ensure Enlive (thickened) 8oz PO once daily and Ensure pudding 4oz PO once daily encouraged; NSG reports that patient to be d/c'd to [facility] upon d/c to continue ABT; no edema noted; MOLST indicates DNR/DNI/IV okay, no TF; patient was previously diagnosed with moderate malnutrition in the context of chronic disease; will continue to monitor PO intake and adjust supplement recommendations as needed; no weight loss noted, which is desirable; following closely; RD to remain available    Factors impacting intake: [ ] none [ ] nausea  [ ] vomiting [ ] diarrhea [ ] constipation  [ ]chewing problems [X ] swallowing issues  [X ] other: Gi symptoms (+ C-diff), impaired cognition    Diet Prescription: Diet, Dysphagia 1 Pureed-Honey Consistency Fluid:   Supplement Feeding Modality:  Oral  Ensure Enlive Servings Per Day:  1       Frequency:  Daily  Ensure Pudding Cans or Servings Per Day:  1       Frequency:  Daily (01-13-21 @ 16:22)    Intake: varied (%)    Current Weight: Weight (kg): 45.4 (01-17 @ 08:21)    Pertinent Medications: MEDICATIONS  (STANDING):  enoxaparin Injectable 40 milliGRAM(s) SubCutaneous daily  escitalopram 10 milliGRAM(s) Oral daily  lactated ringers. 1000 milliLiter(s) (75 mL/Hr) IV Continuous <Continuous>  levoFLOXacin  Tablet 250 milliGRAM(s) Oral every 24 hours  levothyroxine 75 MICROGram(s) Oral daily  metoprolol succinate ER 50 milliGRAM(s) Oral daily  saccharomyces boulardii 250 milliGRAM(s) Oral two times a day  vancomycin    Solution 125 milliGRAM(s) Oral every 6 hours    MEDICATIONS  (PRN):    Pertinent Labs: 01-17 Na137 mmol/L Glu 101 mg/dL<H> K+ 4.1 mmol/L Cr  0.59 mg/dL BUN 11 mg/dL 01-13 Alb 2.2 g/dL<L>    Skin: intact    Estimated Needs:   [X ] no change since previous assessment    Previous Nutrition Diagnosis:    [X ] Malnutrition     Nutrition Diagnosis is [X ] ongoing      New Nutrition Diagnosis: [X ] not applicable     Interventions:   Recommend  [ ] Change Diet To:  [ ] Nutrition Supplement  [ ] Nutrition Support  [ X] Other: Continue POC     Monitoring and Evaluation:   [X ] PO intake [ x ] Tolerance to diet prescription [ x ] weights [ x ] labs[ x ] follow up per protocol

## 2021-01-17 NOTE — PROGRESS NOTE ADULT - ATTENDING COMMENTS
Infectious Diseases will continue to follow. Please call with any questions.   Marilyn Navarrete M.D.  [facility], Division of Infectious Diseases [phone]  For over the weekend and after hours, please call [phone]  I am covering the [facility] service and ID attending Dr. Prieto's service through the long weekend 1/16-1/18

## 2021-01-17 NOTE — PROGRESS NOTE ADULT - PROBLEM SELECTOR PLAN 2
started on Vanco PO. Continue Florastor  leukocytosis improved  has anemia but no signs or symptoms of active bleeding. Continue to monitor hgb.

## 2021-01-17 NOTE — PROGRESS NOTE ADULT - PROBLEM SELECTOR PLAN 5
discussed overall failure to thrive, progressively weakened state, recurrent admissions. daughter thinks she has paperwork stating she does not want resuscitation or feeding tube. will look for them.   D/w Dr Dumont - will see pt
DVT proph - lovenox

## 2021-01-17 NOTE — PROGRESS NOTE ADULT - ASSESSMENT
Pt is 89W w/ PMHx of dementia, HTN, hypothyroidism, congenital single kidney, and autoimmune hepatitis, s/p left hip hemiarthroplasty for fracture on 11/16/20, recent admission for UTI now p/w weakness     Aspiration PNA vs. HAP  Acute cystitis/UTI  Leukocytosis--now downtrending  Given recent hospitalization and admission to rehab, would opt to cover her for nosocomial organisms  Imaging reviewed, CXR w/ RUL infiltrate  -flu/RSV/COVID negative  -UA?+, but UCx +GPC--given that this grew in the presence of cefepime; this is likely enterococcus as cephalosporins have no activity against enterococcal species  -f/u final UCx to help guide therapy.   Enterococcal species usually susceptible to penicillins however pt w/ allergy. Holding other options such as FQ or vancomycin given renal fxn  -trend fevers/leukocytosis  -maintain aspiration precautions  -d/c cefepime  -will start levofloxacin 250mg q24h x3 days until 1/19/2021  Renally dosed for CrCL ~10    C. Diff Infection  Diarrhea  -c. diff positive  -c/w vancomycin 125mg PO q6h x10 day course until 1/24/2021    CKD   CrCL ~10  renally dose medications

## 2021-01-17 NOTE — PROGRESS NOTE ADULT - PROBLEM SELECTOR PLAN 4
continue with toprol  - BP remains elevated.   -continue to monitor. Daughter still does not want additional BP medication added for her mother.

## 2021-01-17 NOTE — PROGRESS NOTE ADULT - PROBLEM SELECTOR PLAN 1
Continue with Cefepime until 1/18/21.   -blood cultures with no growth to date  -Urine Culture with 50-99k E faecalis-await ID recommendations regarding abx guidance  -WBC count trending downward. Afebrile

## 2021-01-17 NOTE — PROGRESS NOTE ADULT - ATTENDING COMMENTS
Discussed plan of care with patients RN Lenore. Spoke with patients daughterAmina. Daughter wants patient discharged [facility] if possible. Discharge planning, still requiring 1 more day of IV abx and f/u ID recommendations regarding Urine Culture results. Discussed plan of care with patients RN Lenore. Spoke with patients daughterAmina. Daughter wants patient discharged [facility] if possible. [facility] not accepting patients for admission today 1/17/21. Possible DC to [facility] tomorrow on PO abx.

## 2021-01-17 NOTE — PROGRESS NOTE ADULT - PROBLEM SELECTOR PROBLEM 2
Clostridioides difficile diarrhea
Hypothyroid
Clostridioides difficile diarrhea
Clostridioides difficile diarrhea

## 2021-01-18 ENCOUNTER — TRANSCRIPTION ENCOUNTER (OUTPATIENT)
Age: 86
End: 2021-01-18

## 2021-01-18 VITALS
SYSTOLIC BLOOD PRESSURE: 153 MMHG | HEART RATE: 95 BPM | RESPIRATION RATE: 16 BRPM | OXYGEN SATURATION: 91 % | DIASTOLIC BLOOD PRESSURE: 77 MMHG | TEMPERATURE: 98 F

## 2021-01-18 LAB
ANION GAP SERPL CALC-SCNC: 6 MMOL/L — SIGNIFICANT CHANGE UP (ref 5–17)
BUN SERPL-MCNC: 13 MG/DL — SIGNIFICANT CHANGE UP (ref 7–23)
CALCIUM SERPL-MCNC: 8.8 MG/DL — SIGNIFICANT CHANGE UP (ref 8.4–10.5)
CHLORIDE SERPL-SCNC: 101 MMOL/L — SIGNIFICANT CHANGE UP (ref 96–108)
CO2 SERPL-SCNC: 29 MMOL/L — SIGNIFICANT CHANGE UP (ref 22–31)
CREAT SERPL-MCNC: 0.65 MG/DL — SIGNIFICANT CHANGE UP (ref 0.5–1.3)
CULTURE RESULTS: SIGNIFICANT CHANGE UP
CULTURE RESULTS: SIGNIFICANT CHANGE UP
GLUCOSE SERPL-MCNC: 91 MG/DL — SIGNIFICANT CHANGE UP (ref 70–99)
HCT VFR BLD CALC: 33.4 % — LOW (ref 34.5–45)
HGB BLD-MCNC: 10.7 G/DL — LOW (ref 11.5–15.5)
MCHC RBC-ENTMCNC: 28.2 PG — SIGNIFICANT CHANGE UP (ref 27–34)
MCHC RBC-ENTMCNC: 32 GM/DL — SIGNIFICANT CHANGE UP (ref 32–36)
MCV RBC AUTO: 87.9 FL — SIGNIFICANT CHANGE UP (ref 80–100)
NRBC # BLD: 0 /100 WBCS — SIGNIFICANT CHANGE UP (ref 0–0)
PLATELET # BLD AUTO: 355 K/UL — SIGNIFICANT CHANGE UP (ref 150–400)
POTASSIUM SERPL-MCNC: 4.2 MMOL/L — SIGNIFICANT CHANGE UP (ref 3.5–5.3)
POTASSIUM SERPL-SCNC: 4.2 MMOL/L — SIGNIFICANT CHANGE UP (ref 3.5–5.3)
RBC # BLD: 3.8 M/UL — SIGNIFICANT CHANGE UP (ref 3.8–5.2)
RBC # FLD: 14.5 % — SIGNIFICANT CHANGE UP (ref 10.3–14.5)
SODIUM SERPL-SCNC: 136 MMOL/L — SIGNIFICANT CHANGE UP (ref 135–145)
SPECIMEN SOURCE: SIGNIFICANT CHANGE UP
SPECIMEN SOURCE: SIGNIFICANT CHANGE UP
WBC # BLD: 12.17 K/UL — HIGH (ref 3.8–10.5)
WBC # FLD AUTO: 12.17 K/UL — HIGH (ref 3.8–10.5)

## 2021-01-18 PROCEDURE — 86769 SARS-COV-2 COVID-19 ANTIBODY: CPT

## 2021-01-18 PROCEDURE — 87631 RESP VIRUS 3-5 TARGETS: CPT

## 2021-01-18 PROCEDURE — 36415 COLL VENOUS BLD VENIPUNCTURE: CPT

## 2021-01-18 PROCEDURE — 83605 ASSAY OF LACTIC ACID: CPT

## 2021-01-18 PROCEDURE — 85027 COMPLETE CBC AUTOMATED: CPT

## 2021-01-18 PROCEDURE — 83735 ASSAY OF MAGNESIUM: CPT

## 2021-01-18 PROCEDURE — 87040 BLOOD CULTURE FOR BACTERIA: CPT

## 2021-01-18 PROCEDURE — U0005: CPT

## 2021-01-18 PROCEDURE — 80053 COMPREHEN METABOLIC PANEL: CPT

## 2021-01-18 PROCEDURE — 92610 EVALUATE SWALLOWING FUNCTION: CPT

## 2021-01-18 PROCEDURE — 97530 THERAPEUTIC ACTIVITIES: CPT

## 2021-01-18 PROCEDURE — 87493 C DIFF AMPLIFIED PROBE: CPT

## 2021-01-18 PROCEDURE — 85025 COMPLETE CBC W/AUTO DIFF WBC: CPT

## 2021-01-18 PROCEDURE — 97116 GAIT TRAINING THERAPY: CPT

## 2021-01-18 PROCEDURE — 93005 ELECTROCARDIOGRAM TRACING: CPT

## 2021-01-18 PROCEDURE — U0003: CPT

## 2021-01-18 PROCEDURE — 96367 TX/PROPH/DG ADDL SEQ IV INF: CPT

## 2021-01-18 PROCEDURE — 87186 SC STD MICRODIL/AGAR DIL: CPT

## 2021-01-18 PROCEDURE — 85610 PROTHROMBIN TIME: CPT

## 2021-01-18 PROCEDURE — 87086 URINE CULTURE/COLONY COUNT: CPT

## 2021-01-18 PROCEDURE — 99285 EMERGENCY DEPT VISIT HI MDM: CPT

## 2021-01-18 PROCEDURE — 97161 PT EVAL LOW COMPLEX 20 MIN: CPT

## 2021-01-18 PROCEDURE — 99239 HOSP IP/OBS DSCHRG MGMT >30: CPT

## 2021-01-18 PROCEDURE — 81001 URINALYSIS AUTO W/SCOPE: CPT

## 2021-01-18 PROCEDURE — 85730 THROMBOPLASTIN TIME PARTIAL: CPT

## 2021-01-18 PROCEDURE — 71045 X-RAY EXAM CHEST 1 VIEW: CPT

## 2021-01-18 PROCEDURE — 80048 BASIC METABOLIC PNL TOTAL CA: CPT

## 2021-01-18 PROCEDURE — 96365 THER/PROPH/DIAG IV INF INIT: CPT

## 2021-01-18 RX ORDER — VANCOMYCIN HCL 1 G
1 VIAL (EA) INTRAVENOUS
Qty: 28 | Refills: 0
Start: 2021-01-18 | End: 2021-01-24

## 2021-01-18 RX ORDER — SACCHAROMYCES BOULARDII 250 MG
1 POWDER IN PACKET (EA) ORAL
Qty: 0 | Refills: 0 | DISCHARGE
Start: 2021-01-18

## 2021-01-18 RX ORDER — VANCOMYCIN HCL 1 G
1 VIAL (EA) INTRAVENOUS
Qty: 56 | Refills: 0
Start: 2021-01-18 | End: 2021-01-31

## 2021-01-18 RX ORDER — LACTOBACILLUS ACIDOPHILUS 100MM CELL
2 CAPSULE ORAL
Qty: 0 | Refills: 0 | DISCHARGE

## 2021-01-18 RX ADMIN — Medication 250 MILLIGRAM(S): at 05:42

## 2021-01-18 RX ADMIN — Medication 75 MICROGRAM(S): at 05:42

## 2021-01-18 RX ADMIN — Medication 125 MILLIGRAM(S): at 12:26

## 2021-01-18 RX ADMIN — Medication 125 MILLIGRAM(S): at 05:42

## 2021-01-18 RX ADMIN — ESCITALOPRAM OXALATE 10 MILLIGRAM(S): 10 TABLET, FILM COATED ORAL at 12:26

## 2021-01-18 RX ADMIN — Medication 50 MILLIGRAM(S): at 05:43

## 2021-01-18 RX ADMIN — ENOXAPARIN SODIUM 40 MILLIGRAM(S): 100 INJECTION SUBCUTANEOUS at 12:26

## 2021-01-18 NOTE — PROGRESS NOTE ADULT - SUBJECTIVE AND OBJECTIVE BOX
[facility], Division of Infectious Diseases  BARBARA Vaughan Y. Patel, S. Shah  [phone]    Name: JOESPH MAYBERRY  Age: 89y  Gender: Female  MRN: [id number]  Note Date: 01-18-21    Interval History:  Patient seen and examined at bedside this morning  No acute overnight events. Afebrile overnight  Notes reviewed    Antibiotics:  levoFLOXacin  Tablet 250 milliGRAM(s) Oral every 24 hours  vancomycin    Solution 125 milliGRAM(s) Oral every 6 hours      Medications:  enoxaparin Injectable 40 milliGRAM(s) SubCutaneous daily  escitalopram 10 milliGRAM(s) Oral daily  lactated ringers. 1000 milliLiter(s) IV Continuous <Continuous>  levoFLOXacin  Tablet 250 milliGRAM(s) Oral every 24 hours  levothyroxine 75 MICROGram(s) Oral daily  metoprolol succinate ER 50 milliGRAM(s) Oral daily  saccharomyces boulardii 250 milliGRAM(s) Oral two times a day  vancomycin    Solution 125 milliGRAM(s) Oral every 6 hours      Review of Systems:  Review of systems otherwise negative except as previously noted.    Allergies: latex (Rash)  penicillin (Rash)  sulfa drugs (Rash)    For details regarding the patient's past medical history, social history, family history, and other miscellaneous elements, please refer the initial infectious diseases consultation and/or the admitting history and physical examination for this admission.    Objective:  Vitals:   T(C): 36.9 (01-18-21 @ 09:30), Max: 37.4 (01-17-21 @ 17:20)  HR: 82 (01-18-21 @ 09:30) (74 - 97)  BP: 125/75 (01-18-21 @ 09:30) (125/75 - 176/70)  RR: 18 (01-18-21 @ 09:30) (16 - 18)  SpO2: 95% (01-18-21 @ 09:30) (91% - 95%)    Physical Examination:  General: no acute distress  HEENT: NC/AT, EOMI, anicteric, no oral lesions  Neck: supple, no palpable LAD  Cardio: S1, S2 heard, RRR, no murmurs  Resp: breath sounds heard bilaterally, no rales, wheezes or rhonchi  Abd: soft, NT, ND, + bowel sounds  Neuro: no obvious focal deficits  Ext: no edema or cyanosis  Skin: warm, dry, no visible rash      Laboratory Studies:  CBC:                       10.7   12.17 )-----------( 355      ( 18 Jan 2021 07:49 )             33.4     CMP: 01-18    136  |  101  |  13  ----------------------------<  91  4.2   |  29  |  0.65    Ca    8.8      18 Jan 2021 07:49  Mg     2.1     01-17          Microbiology: reviewed    Culture - Urine (collected 01-13-21 @ 19:04)  Source: .Urine Clean Catch (Midstream)  Final Report (01-16-21 @ 13:11):    50,000 - 99,000 CFU/mL Enterococcus faecalis  Organism: Enterococcus faecalis (01-16-21 @ 13:11)  Organism: Enterococcus faecalis (01-16-21 @ 13:11)      -  Ampicillin: S <=2 Predicts results to ampicillin/sulbactam, amoxacillin-clavulanate and  piperacillin-tazobactam.      -  Ciprofloxacin: I 2      -  Levofloxacin: S 2      -  Nitrofurantoin: S <=32 Should not be used to treat pyelonephritis.      -  Tetra/Doxy: S <=4      -  Vancomycin: S 2      Method Type: SAMMIE    Culture - Blood (collected 01-13-21 @ 18:55)  Source: .Blood Blood-Peripheral  Preliminary Report (01-14-21 @ 19:01):    No growth to date.    Culture - Blood (collected 01-13-21 @ 18:55)  Source: .Blood Blood-Peripheral  Preliminary Report (01-14-21 @ 19:01):    No growth to date.        Radiology: reviewed

## 2021-01-18 NOTE — DISCHARGE NOTE PROVIDER - HOSPITAL COURSE
89F with PMH baseline dementia, HTN, hypothyroidism, congenital single kidney, and autoimmune hepatitis, s/p left hip hemiarthroplasty for fracture on 11/16/20 then dc home from [facility] on 12/22/20, recently dc from [facility] for UTI on 1/9/21. While at home daughter reports patient has been weaker overall, has needed increased assistance ambulating.  Has been even more confused.  Not eating but daughter has been forcing her to drink water (regular thin water).   Last 24 hours patient has felt warm and facial erythema.  She also noticed a cough.   She took her temp and it was normal but brought her to the ER anyway.      Acute Respiratory Failure and UTI were diagnosed.  Patient symptoms were attributed to aspiration after a speech and swallow evaluation.  ID was consulted and cultures from Urine and blood followed. Based on cultures patient was started on antibiotics and will be discharged on Levaquin.  Patient did have a 100.2 temperature on discharge and some facial erythema.  This seems to occur after meals intermittently and it is expected that patient might be silently aspirating despite the diet modification.  Most of the effects are transient and patient will be discharged on antibiotics.     She was also found to have C. Diff and will be discharged on PO Vancomycin until 1/24/21    Discharge planning >60 minutes.         PHYSICAL EXAM:  Vital Signs Last 24 Hrs  T(C): 36.9 (18 Jan 2021 13:30), Max: 37.9 (18 Jan 2021 12:51)  T(F): 98.5 (18 Jan 2021 13:30), Max: 100.2 (18 Jan 2021 12:51)  HR: 88 (18 Jan 2021 13:30) (74 - 99)  BP: 152/76 (18 Jan 2021 13:30) (125/75 - 176/70)  BP(mean): --  RR: 17 (18 Jan 2021 12:51) (16 - 18)  SpO2: 95% (18 Jan 2021 12:51) (91% - 95%)    GENERAL: NAD, well-groomed, well-developed  HEAD:  Atraumatic, Normocephalic  EYES: EOMI, PERRLA, conjunctiva and sclera clear  ENMT: No tonsillar erythema, exudates, or enlargement; Moist mucous membranes, Good dentition, No lesions  NECK: Supple, No JVD, Normal thyroid  NERVOUS SYSTEM:  Alert & Oriented X3, Good concentration; Motor Strength 5/5 B/L upper and lower extremities; CHEST/LUNG: Clear to auscultation bilaterally; No rales, rhonchi, wheezing, or rubs  HEART: Regular rate and rhythm; No murmurs, rubs, or gallops  ABDOMEN: Soft, Nontender, Nondistended; Bowel sounds present  EXTREMITIES:  2+ Peripheral Pulses, No clubbing, cyanosis, or edema  LYMPH: No lymphadenopathy noted  SKIN: No rashes or lesions

## 2021-01-18 NOTE — PROGRESS NOTE ADULT - PROVIDER SPECIALTY LIST ADULT
Hospitalist
Infectious Disease
Hospitalist

## 2021-01-18 NOTE — PROGRESS NOTE ADULT - ATTENDING COMMENTS
Infectious Diseases will continue to follow. Please call with any questions.   Marilyn Navarrete M.D.  [facility], Division of Infectious Diseases [phone]  For over the weekend and after hours, please call [phone]  [facility] and ID attending Dr. Prieto's service through the long weekend 1/16-1/18

## 2021-01-18 NOTE — DISCHARGE NOTE PROVIDER - NSDCMRMEDTOKEN_GEN_ALL_CORE_FT
escitalopram 5 mg oral tablet: 2 tab(s) orally once a day  levoFLOXacin 250 mg oral tablet: 1 tab(s) orally every 24 hours. Stop after 3 doses on 1/21  metoprolol succinate 50 mg oral tablet, extended release: 1 tab(s) orally once a day (at bedtime)  saccharomyces boulardii lyo 250 mg oral capsule: 1 cap(s) orally 2 times a day  Synthroid 75 mcg (0.075 mg) oral tablet: 1 tab(s) orally once a day

## 2021-01-18 NOTE — PROGRESS NOTE ADULT - ASSESSMENT
Pt is 89W w/ PMHx of dementia, HTN, hypothyroidism, congenital single kidney, and autoimmune hepatitis, s/p left hip hemiarthroplasty for fracture on 11/16/20, recent admission for UTI now p/w weakness     Aspiration PNA vs. HAP  Acute cystitis/UTI 2/2 Enterococcus  Leukocytosis--now downtrending  Given recent hospitalization and admission to rehab, would opt to cover her for nosocomial organisms  Imaging reviewed, CXR w/ RUL infiltrate  UCx w/ enterococcus  -trend fevers/leukocytosis  -maintain aspiration precautions  -c/w levofloxacin 250mg q24h x3 days until 1/19/2021  Renally dosed for CrCL ~10    C. Diff Infection  Diarrhea  -c. diff positive  -c/w vancomycin 125mg PO q6h x10 day course until 1/24/2021    CKD   CrCL ~10  renally dose medications

## 2021-01-18 NOTE — DISCHARGE NOTE PROVIDER - NSDCCPCAREPLAN_GEN_ALL_CORE_FT
PRINCIPAL DISCHARGE DIAGNOSIS  Diagnosis: PNA (pneumonia)  Assessment and Plan of Treatment: Due to Aspiration.  Diet modified. Discharged on Levaquin.      SECONDARY DISCHARGE DIAGNOSES  Diagnosis: Clostridium difficile diarrhea  Assessment and Plan of Treatment: Oral vancomycin until 1/24/21.

## 2021-01-18 NOTE — PROVIDER CONTACT NOTE (OTHER) - SITUATION
Serum potassium 3.3
Patient has  temperature of 100.2 rectally , O2 sat was 84% on room air . 2L NC  applied and O2 sat increased to 94%

## 2021-01-18 NOTE — DISCHARGE NOTE PROVIDER - DETAILS OF MALNUTRITION DIAGNOSIS/DIAGNOSES
This patient has been assessed with a concern for Malnutrition and was treated during this hospitalization for the following Nutrition diagnosis/diagnoses:     -  01/14/2021: Moderate protein-calorie malnutrition

## 2021-01-18 NOTE — DISCHARGE NOTE NURSING/CASE MANAGEMENT/SOCIAL WORK - PATIENT PORTAL LINK FT
You can access the FollowMyHealth Patient Portal offered by [facility] by registering at the following website: [email or web address]. By joining [facility]’s FollowMyHealth portal, you will also be able to view your health information using other applications (apps) compatible with our system.

## 2021-01-20 ENCOUNTER — INPATIENT (INPATIENT)
Facility: HOSPITAL | Age: 86
LOS: 0 days | DRG: 951 | End: 2021-01-21
Attending: STUDENT IN AN ORGANIZED HEALTH CARE EDUCATION/TRAINING PROGRAM | Admitting: STUDENT IN AN ORGANIZED HEALTH CARE EDUCATION/TRAINING PROGRAM
Payer: OTHER MISCELLANEOUS

## 2021-01-20 VITALS
DIASTOLIC BLOOD PRESSURE: 60 MMHG | HEART RATE: 56 BPM | OXYGEN SATURATION: 99 % | RESPIRATION RATE: 57 BRPM | SYSTOLIC BLOOD PRESSURE: 106 MMHG | HEIGHT: 65 IN | TEMPERATURE: 100 F | WEIGHT: 120.15 LBS

## 2021-01-20 DIAGNOSIS — Z98.89 OTHER SPECIFIED POSTPROCEDURAL STATES: Chronic | ICD-10-CM

## 2021-01-20 DIAGNOSIS — R06.03 ACUTE RESPIRATORY DISTRESS: ICD-10-CM

## 2021-01-20 DIAGNOSIS — Z90.49 ACQUIRED ABSENCE OF OTHER SPECIFIED PARTS OF DIGESTIVE TRACT: Chronic | ICD-10-CM

## 2021-01-20 DIAGNOSIS — Z90.710 ACQUIRED ABSENCE OF BOTH CERVIX AND UTERUS: Chronic | ICD-10-CM

## 2021-01-20 DIAGNOSIS — Z98.890 OTHER SPECIFIED POSTPROCEDURAL STATES: Chronic | ICD-10-CM

## 2021-01-20 LAB
ALBUMIN SERPL ELPH-MCNC: 2.2 G/DL — LOW (ref 3.3–5)
ALP SERPL-CCNC: 93 U/L — SIGNIFICANT CHANGE UP (ref 40–120)
ALT FLD-CCNC: 81 U/L — HIGH (ref 10–45)
ANION GAP SERPL CALC-SCNC: 17 MMOL/L — SIGNIFICANT CHANGE UP (ref 5–17)
APTT BLD: 26.8 SEC — LOW (ref 27.5–35.5)
AST SERPL-CCNC: 286 U/L — HIGH (ref 10–40)
BASOPHILS # BLD AUTO: 0.06 K/UL — SIGNIFICANT CHANGE UP (ref 0–0.2)
BASOPHILS NFR BLD AUTO: 0.3 % — SIGNIFICANT CHANGE UP (ref 0–2)
BILIRUB SERPL-MCNC: 0.8 MG/DL — SIGNIFICANT CHANGE UP (ref 0.2–1.2)
BUN SERPL-MCNC: 36 MG/DL — HIGH (ref 7–23)
CALCIUM SERPL-MCNC: 9 MG/DL — SIGNIFICANT CHANGE UP (ref 8.4–10.5)
CHLORIDE SERPL-SCNC: 104 MMOL/L — SIGNIFICANT CHANGE UP (ref 96–108)
CO2 BLDA-SCNC: 20 MMOL/L — LOW (ref 22–30)
CO2 SERPL-SCNC: 20 MMOL/L — LOW (ref 22–31)
CREAT SERPL-MCNC: 1.5 MG/DL — HIGH (ref 0.5–1.3)
CRP SERPL-MCNC: 9.45 MG/DL — HIGH (ref 0–0.4)
D DIMER BLD IA.RAPID-MCNC: SIGNIFICANT CHANGE UP NG/ML DDU
EOSINOPHIL # BLD AUTO: 0 K/UL — SIGNIFICANT CHANGE UP (ref 0–0.5)
EOSINOPHIL NFR BLD AUTO: 0 % — SIGNIFICANT CHANGE UP (ref 0–6)
FERRITIN SERPL-MCNC: 1379 NG/ML — HIGH (ref 15–150)
GAS PNL BLDA: SIGNIFICANT CHANGE UP
GLUCOSE SERPL-MCNC: 177 MG/DL — HIGH (ref 70–99)
HCT VFR BLD CALC: 33.2 % — LOW (ref 34.5–45)
HGB BLD-MCNC: 10.8 G/DL — LOW (ref 11.5–15.5)
HOROWITZ INDEX BLDA+IHG-RTO: SIGNIFICANT CHANGE UP
IMM GRANULOCYTES NFR BLD AUTO: 0.9 % — SIGNIFICANT CHANGE UP (ref 0–1.5)
INR BLD: 1.54 RATIO — HIGH (ref 0.88–1.16)
LACTATE SERPL-SCNC: 5.9 MMOL/L — CRITICAL HIGH (ref 0.7–2)
LYMPHOCYTES # BLD AUTO: 1.29 K/UL — SIGNIFICANT CHANGE UP (ref 1–3.3)
LYMPHOCYTES # BLD AUTO: 5.4 % — LOW (ref 13–44)
MCHC RBC-ENTMCNC: 28.6 PG — SIGNIFICANT CHANGE UP (ref 27–34)
MCHC RBC-ENTMCNC: 32.5 GM/DL — SIGNIFICANT CHANGE UP (ref 32–36)
MCV RBC AUTO: 87.8 FL — SIGNIFICANT CHANGE UP (ref 80–100)
MONOCYTES # BLD AUTO: 1.64 K/UL — HIGH (ref 0–0.9)
MONOCYTES NFR BLD AUTO: 6.9 % — SIGNIFICANT CHANGE UP (ref 2–14)
NEUTROPHILS # BLD AUTO: 20.67 K/UL — HIGH (ref 1.8–7.4)
NEUTROPHILS NFR BLD AUTO: 86.5 % — HIGH (ref 43–77)
NRBC # BLD: 0 /100 WBCS — SIGNIFICANT CHANGE UP (ref 0–0)
NT-PROBNP SERPL-SCNC: HIGH PG/ML (ref 0–300)
PCO2 BLDA: 23 MMHG — LOW (ref 32–46)
PH BLDA: 7.53 — HIGH (ref 7.35–7.45)
PLATELET # BLD AUTO: 213 K/UL — SIGNIFICANT CHANGE UP (ref 150–400)
PO2 BLDA: 133 MMHG — HIGH (ref 74–108)
POTASSIUM SERPL-MCNC: 4.7 MMOL/L — SIGNIFICANT CHANGE UP (ref 3.5–5.3)
POTASSIUM SERPL-SCNC: 4.7 MMOL/L — SIGNIFICANT CHANGE UP (ref 3.5–5.3)
PROCALCITONIN SERPL-MCNC: 0.58 NG/ML — HIGH
PROT SERPL-MCNC: 8.5 G/DL — HIGH (ref 6–8.3)
PROTHROM AB SERPL-ACNC: 18.3 SEC — HIGH (ref 10.6–13.6)
RBC # BLD: 3.78 M/UL — LOW (ref 3.8–5.2)
RBC # FLD: 15.1 % — HIGH (ref 10.3–14.5)
SAO2 % BLDA: 98 % — HIGH (ref 92–96)
SARS-COV-2 RNA SPEC QL NAA+PROBE: SIGNIFICANT CHANGE UP
SODIUM SERPL-SCNC: 141 MMOL/L — SIGNIFICANT CHANGE UP (ref 135–145)
TROPONIN I SERPL-MCNC: 19.87 NG/ML — HIGH (ref 0.02–0.06)
WBC # BLD: 23.88 K/UL — HIGH (ref 3.8–10.5)
WBC # FLD AUTO: 23.88 K/UL — HIGH (ref 3.8–10.5)

## 2021-01-20 PROCEDURE — 99223 1ST HOSP IP/OBS HIGH 75: CPT | Mod: GV

## 2021-01-20 PROCEDURE — 84439 ASSAY OF FREE THYROXINE: CPT

## 2021-01-20 PROCEDURE — 80048 BASIC METABOLIC PNL TOTAL CA: CPT

## 2021-01-20 PROCEDURE — 85610 PROTHROMBIN TIME: CPT

## 2021-01-20 PROCEDURE — 99223 1ST HOSP IP/OBS HIGH 75: CPT

## 2021-01-20 PROCEDURE — 0225U NFCT DS DNA&RNA 21 SARSCOV2: CPT

## 2021-01-20 PROCEDURE — 80053 COMPREHEN METABOLIC PANEL: CPT

## 2021-01-20 PROCEDURE — 84436 ASSAY OF TOTAL THYROXINE: CPT

## 2021-01-20 PROCEDURE — 99285 EMERGENCY DEPT VISIT HI MDM: CPT | Mod: 25

## 2021-01-20 PROCEDURE — 76775 US EXAM ABDO BACK WALL LIM: CPT

## 2021-01-20 PROCEDURE — 97116 GAIT TRAINING THERAPY: CPT

## 2021-01-20 PROCEDURE — 81001 URINALYSIS AUTO W/SCOPE: CPT

## 2021-01-20 PROCEDURE — U0003: CPT

## 2021-01-20 PROCEDURE — 92611 MOTION FLUOROSCOPY/SWALLOW: CPT

## 2021-01-20 PROCEDURE — 86769 SARS-COV-2 COVID-19 ANTIBODY: CPT

## 2021-01-20 PROCEDURE — 74230 X-RAY XM SWLNG FUNCJ C+: CPT

## 2021-01-20 PROCEDURE — 92610 EVALUATE SWALLOWING FUNCTION: CPT

## 2021-01-20 PROCEDURE — 93010 ELECTROCARDIOGRAM REPORT: CPT

## 2021-01-20 PROCEDURE — 93005 ELECTROCARDIOGRAM TRACING: CPT

## 2021-01-20 PROCEDURE — 85730 THROMBOPLASTIN TIME PARTIAL: CPT

## 2021-01-20 PROCEDURE — 84443 ASSAY THYROID STIM HORMONE: CPT

## 2021-01-20 PROCEDURE — 84480 ASSAY TRIIODOTHYRONINE (T3): CPT

## 2021-01-20 PROCEDURE — 85027 COMPLETE CBC AUTOMATED: CPT

## 2021-01-20 PROCEDURE — 87086 URINE CULTURE/COLONY COUNT: CPT

## 2021-01-20 PROCEDURE — 84145 PROCALCITONIN (PCT): CPT

## 2021-01-20 PROCEDURE — 85025 COMPLETE CBC W/AUTO DIFF WBC: CPT

## 2021-01-20 PROCEDURE — 99497 ADVNCD CARE PLAN 30 MIN: CPT | Mod: 25

## 2021-01-20 PROCEDURE — 36415 COLL VENOUS BLD VENIPUNCTURE: CPT

## 2021-01-20 PROCEDURE — 70551 MRI BRAIN STEM W/O DYE: CPT

## 2021-01-20 PROCEDURE — 83735 ASSAY OF MAGNESIUM: CPT

## 2021-01-20 PROCEDURE — 73502 X-RAY EXAM HIP UNI 2-3 VIEWS: CPT

## 2021-01-20 PROCEDURE — U0005: CPT

## 2021-01-20 PROCEDURE — 83615 LACTATE (LD) (LDH) ENZYME: CPT

## 2021-01-20 PROCEDURE — 99291 CRITICAL CARE FIRST HOUR: CPT

## 2021-01-20 PROCEDURE — 87040 BLOOD CULTURE FOR BACTERIA: CPT

## 2021-01-20 PROCEDURE — 83605 ASSAY OF LACTIC ACID: CPT

## 2021-01-20 PROCEDURE — 97161 PT EVAL LOW COMPLEX 20 MIN: CPT

## 2021-01-20 PROCEDURE — 71045 X-RAY EXAM CHEST 1 VIEW: CPT

## 2021-01-20 PROCEDURE — 82728 ASSAY OF FERRITIN: CPT

## 2021-01-20 PROCEDURE — 71045 X-RAY EXAM CHEST 1 VIEW: CPT | Mod: 26

## 2021-01-20 PROCEDURE — 70450 CT HEAD/BRAIN W/O DYE: CPT

## 2021-01-20 PROCEDURE — 97110 THERAPEUTIC EXERCISES: CPT

## 2021-01-20 RX ORDER — MORPHINE SULFATE 50 MG/1
4 CAPSULE, EXTENDED RELEASE ORAL ONCE
Refills: 0 | Status: DISCONTINUED | OUTPATIENT
Start: 2021-01-20 | End: 2021-01-20

## 2021-01-20 RX ORDER — SODIUM CHLORIDE 9 MG/ML
1700 INJECTION INTRAMUSCULAR; INTRAVENOUS; SUBCUTANEOUS ONCE
Refills: 0 | Status: COMPLETED | OUTPATIENT
Start: 2021-01-20 | End: 2021-01-20

## 2021-01-20 RX ORDER — MORPHINE SULFATE 50 MG/1
2 CAPSULE, EXTENDED RELEASE ORAL EVERY 4 HOURS
Refills: 0 | Status: DISCONTINUED | OUTPATIENT
Start: 2021-01-20 | End: 2021-01-21

## 2021-01-20 RX ORDER — ATROPINE SULFATE 1 %
4 DROPS OPHTHALMIC (EYE) EVERY 4 HOURS
Refills: 0 | Status: DISCONTINUED | OUTPATIENT
Start: 2021-01-20 | End: 2021-01-21

## 2021-01-20 RX ORDER — ACETAMINOPHEN 500 MG
650 TABLET ORAL ONCE
Refills: 0 | Status: COMPLETED | OUTPATIENT
Start: 2021-01-20 | End: 2021-01-20

## 2021-01-20 RX ORDER — DEXAMETHASONE 0.5 MG/5ML
6 ELIXIR ORAL ONCE
Refills: 0 | Status: COMPLETED | OUTPATIENT
Start: 2021-01-20 | End: 2021-01-20

## 2021-01-20 RX ORDER — ONDANSETRON 8 MG/1
4 TABLET, FILM COATED ORAL EVERY 6 HOURS
Refills: 0 | Status: DISCONTINUED | OUTPATIENT
Start: 2021-01-20 | End: 2021-01-21

## 2021-01-20 RX ORDER — CEFEPIME 1 G/1
1000 INJECTION, POWDER, FOR SOLUTION INTRAMUSCULAR; INTRAVENOUS ONCE
Refills: 0 | Status: COMPLETED | OUTPATIENT
Start: 2021-01-20 | End: 2021-01-20

## 2021-01-20 RX ORDER — VANCOMYCIN HCL 1 G
1000 VIAL (EA) INTRAVENOUS ONCE
Refills: 0 | Status: COMPLETED | OUTPATIENT
Start: 2021-01-20 | End: 2021-01-20

## 2021-01-20 RX ADMIN — SODIUM CHLORIDE 1700 MILLILITER(S): 9 INJECTION INTRAMUSCULAR; INTRAVENOUS; SUBCUTANEOUS at 13:40

## 2021-01-20 RX ADMIN — Medication 650 MILLIGRAM(S): at 12:30

## 2021-01-20 RX ADMIN — Medication 6 MILLIGRAM(S): at 12:40

## 2021-01-20 RX ADMIN — CEFEPIME 1000 MILLIGRAM(S): 1 INJECTION, POWDER, FOR SOLUTION INTRAMUSCULAR; INTRAVENOUS at 14:05

## 2021-01-20 RX ADMIN — MORPHINE SULFATE 4 MILLIGRAM(S): 50 CAPSULE, EXTENDED RELEASE ORAL at 13:21

## 2021-01-20 RX ADMIN — Medication 250 MILLIGRAM(S): at 14:05

## 2021-01-20 RX ADMIN — Medication 4 DROP(S): at 22:49

## 2021-01-20 RX ADMIN — CEFEPIME 100 MILLIGRAM(S): 1 INJECTION, POWDER, FOR SOLUTION INTRAMUSCULAR; INTRAVENOUS at 13:40

## 2021-01-20 RX ADMIN — MORPHINE SULFATE 2 MILLIGRAM(S): 50 CAPSULE, EXTENDED RELEASE ORAL at 22:40

## 2021-01-20 NOTE — ED PROVIDER NOTE - OBJECTIVE STATEMENT
pt 89 yo f recent admission to [facility] for pneumonia and c.diff  and was in facility for 2 days and as per pts daughter pt has not been able to communicate but today came to ED for respiratory distress sating 70% on RA and tachypneic to 50's. as per facility pt tested COVID + yesterday but no official results in the computer system. pt DNR/DNI

## 2021-01-20 NOTE — CONSULT NOTE ADULT - CONVERSATION DETAILS
Met with daughter at bedside, daughter says pt came here from [facility] for extreme sob, and possibly covid . Currently pt does have a pna on xray, sepsis and stemi , w/ severe sob.  Daughter stated she just wishes for her mother to be comfortable, she does not wish any heroic measures. Pt already has a Molst completed and confirmed dnr/dni. Daughter says her mother has been declining since November when she fell and broke her hip. From then on pt has been in/out of hospital and now was in [facility] only for 2 days. We reviewed pts current condition and discussed hospice , daughter wishes comfort measures and agreed to hospice services .   Discussed pt too unstable to transfer to the hospice facility at this time, and that pt can remain here on hospice.

## 2021-01-20 NOTE — HOSPICE CARE NOTE - CONVESATION DETAILS
This writer received referral for inpt hospice. Pt approved for inpt hospice and is currently unstable for transfer. Pt admitted under hospice at [facility] at this time.

## 2021-01-20 NOTE — H&P ADULT - NSHPPHYSICALEXAM_GEN_ALL_CORE
Vital Signs Last 24 Hrs  T(C): 36.7 (20 Jan 2021 16:24), Max: 39.6 (20 Jan 2021 12:29)  T(F): 98.1 (20 Jan 2021 16:24), Max: 103.2 (20 Jan 2021 12:29)  HR: 102 (20 Jan 2021 16:24) (56 - 121)  BP: 95/54 (20 Jan 2021 16:24) (75/43 - 116/63)  BP(mean): 54 (20 Jan 2021 15:43) (51 - 76)  RR: 18 (20 Jan 2021 16:24) (18 - 57)  SpO2: 96% (20 Jan 2021 16:24) (96% - 100%) - on venti mask    PHYSICAL EXAM:  GENERAL: frail chronically ill appearing elderly female, on ventimask, actively dying. appears in mild distress due to difficulty breathing, cachectic   HEAD:  AT/NC   EYES: EOMI, PERRL  ENMT: No tonsillar erythema, exudates, or enlargement; No lesions  NECK: Supple, No JVD  NERVOUS SYSTEM: Alert, grossly moves all extremities   CHEST/LUNG: poor inspiratory effort, mild coarse breath sounds throughout all lobes; use of accessory muscles   HEART: tachycardiac, No murmurs, rubs, or gallops  ABDOMEN: Soft, Nontender, Nondistended; Bowel sounds present  EXTREMITIES:  2+ Peripheral Pulses, thin extremities   LYMPH: No lymphadenopathy noted  SKIN: No rashes or lesions

## 2021-01-20 NOTE — H&P ADULT - HISTORY OF PRESENT ILLNESS
89F PMH of dementia, depression HTN, hypothyroidism, congential single kidney, autoimmune hepatitis presents with respiratory distress SpO2 70% on RA and tachypneic to the 50s. Per facility patient tested positive for COVID19. Evaluated by hospice in the ED and was admitted for inpatient hospice. Of note patient with admission at [facility] on 1/9 for UTI. Home recent admission to [facility] for aspiration PNA, UTI, and c diff. Patient completed course of Levaquin and supposed to be on Vancomycin until 1/24.     In the ED: rectal temp 103.2, , /63, RR 57, SpO2 99% on non-rebreather.   WBC 23.88, H/H 10.8/33.2, lactate 5.9 repeat 5.6, BUN/Cr 36/1.5, troponin 19.868, Pro-BNP 70K.   Chest x-ray (personally reviewed): bilateral lung infiltrates

## 2021-01-20 NOTE — ED PROVIDER NOTE - PROGRESS NOTE DETAILS
spoke to Darrion at the [facility] to coordinate transfer however pts daughter came into the ED at the same time and wants pt to be comfort care only and no transfer needed for intervention after discussing EKG findings Jocelynn from admitting informed about hospice admission

## 2021-01-20 NOTE — ED ADULT TRIAGE NOTE - CHIEF COMPLAINT QUOTE
respiratory distress - from [facility]. Pt. DNI/DNR (presents w/ Molst) respiratory distress - from [facility]. Pt. DNI/DNR (presents w/ Molst). positive COVID yesterday respiratory distress - from [facility]. Pt. DNI/DNR (presents w/ Molst). positive COVID PNA yesterday as per EMS

## 2021-01-20 NOTE — H&P ADULT - ASSESSMENT
9F PMH of dementia, depression HTN, hypothyroidism, congential single kidney, autoimmune hepatitis presents with respiratory distress SpO2 70% on RA and tachypneic to the 50s admitted for inpatient hospice.     #Inpatient hospice - end of life care  DNR/DNI  - Comfort care measures only  - Discontinue all lab tests  - Discontinue all monitors and alarms    #Acute respiratory failure  -Continue supplemental O2  -Morphine 2mg IV PRN q 4 hours for SOB and pain    -If patient is nausea can consider Zofran PRN   -If patient develops fever consider Tylenol PRN      Children at bedside.    9F PMH of dementia, depression HTN, hypothyroidism, congential single kidney, autoimmune hepatitis presents with respiratory distress SpO2 70% on RA and tachypneic to the 50s, found to have STEMI, severe sepsis and cute hypoxic respiratory failure probably due to HCAP. Daughter declining intervention requesting comfort care, patient admitted for inpatient hospice.     #Inpatient hospice - end of life care  DNR/DNI  - Comfort care measures only   - Discontinue all lab tests  - Discontinue all monitors and alarms    #Acute respiratory failure  -Continue supplemental O2  -Morphine 2mg IV PRN q 4 hours for SOB and pain    -If patient is nausea can consider Zofran PRN   -Atropine PRN for secretions      Children at bedside in the ED

## 2021-01-20 NOTE — CONSULT NOTE ADULT - ASSESSMENT
A/P 89 yo f recent admission to [facility] for pneumonia and c.diff  came to ED today for respiratory distress sating 70% on RA and tachypneic to 50's. as per [facility] facility pt tested COVID + yesterday, but no record of it.  Repeat covid test pending . PNA on xray ,  Pt w/ severe sob , daughter at bedside.         Palliative : asked to assist w/ goc,  and possible hospice admission, reviewed case w/ ED Dr Tee,  Pt in severe resp distress,  Pna on xray , sepsis, and stemi.  Covid pend.    Pt seen bedside w/ severe sob, - IVP  MS given w/ some relief noted.   Daughter Amina at bedside, see GOC note above. Daughter wishes for pt to be comfortable, no  aggressive interventions.    Hospice consult placed, I called and spoke to [facility] liaison, and notified CM ,   Doc to Doc  completed  by Dr Tee.   Pt was approved for inpatient services for ischemic cardiomyopathy w/  Pna.   Case management aware.  Pt too unstable for transfer to the hospice facility at this time,  will remain here on hospice services.   D/w med team Dr Dias, - prn IVP  MS ordered for pain or SOB.

## 2021-01-20 NOTE — ED PROVIDER NOTE - SECONDARY DIAGNOSIS.
HCAP (healthcare-associated pneumonia) Hypoxia ST elevation myocardial infarction (STEMI), unspecified artery Severe sepsis Hospice care

## 2021-01-20 NOTE — ED CLERICAL - NS ED CLERK NOTE PRE-ARRIVAL INFORMATION; ADDITIONAL PRE-ARRIVAL INFORMATION
This patient is enrolled in the [facility] readmission reduction initiative and has active care navigation. This patient can be followed up by the care navigation team within 24 hours.  To arrange close follow-up or to obtain additional clinical information, please call the care navigation contact number above.  For patients previously on the Hospitalist Service please page the hospitalist at [phone] for input and/or consultation PRIOR to admission. If the patient was on a Voluntary Physician’s service please contact that physician for input and/or consultation PRIOR to admission.

## 2021-01-20 NOTE — ED PROVIDER NOTE - NS_EDPROVIDERDISPOUSERTYPE_ED_A_ED
Attending Attestation (For Attendings USE Only)... Paramedian Forehead Flap Text: A decision was made to reconstruct the defect utilizing an interpolation axial flap and a staged reconstruction.  A telfa template was made of the defect.  This telfa template was then used to outline the paramedian forehead pedicle flap.  The donor area for the pedicle flap was then injected with anesthesia.  The flap was excised through the skin and subcutaneous tissue down to the layer of the underlying musculature.  The pedicle flap was carefully excised within this deep plane to maintain its blood supply.  The edges of the donor site were undermined.   The donor site was closed in a primary fashion.  The pedicle was then rotated into position and sutured.  Once the tube was sutured into place, adequate blood supply was confirmed with blanching and refill.  The pedicle was then wrapped with xeroform gauze and dressed appropriately with a telfa and gauze bandage to ensure continued blood supply and protect the attached pedicle.

## 2021-01-20 NOTE — ED ADULT NURSE NOTE - PMH
Autoimmune hepatitis    Dementia    Depression    Enterocolitis due to Clostridioides difficile    HTN (hypertension)    Hypothyroid    Pneumonia

## 2021-01-20 NOTE — ED ADULT NURSE NOTE - OBJECTIVE STATEMENT
Pt presents to ED from [facility] for respiratory distress. According to EMS, pt was admitted to [facility] yesterday and became tachypneic and tachycardic today with altered mental stauts. As per EMS, pt tested + for covid PNA yesterday. Pt presents awake and responsive to pain. Tachypneic and on a NRB, O2 sat 99%. Pt also presents with rectal temp of 103.1 and HR in the 120s.

## 2021-01-20 NOTE — H&P ADULT - NSICDXPASTMEDICALHX_GEN_ALL_CORE_FT
PAST MEDICAL HISTORY:  Autoimmune hepatitis     Dementia     Depression     Enterocolitis due to Clostridioides difficile     HTN (hypertension)     Hypothyroid     Pneumonia

## 2021-01-20 NOTE — ED PROVIDER NOTE - CLINICAL SUMMARY MEDICAL DECISION MAKING FREE TEXT BOX
Dr. Tee: 89F recently covid + per EMS and just went to SNF, BIBEMS in respiratory distress, satting 70%, RR 40, +fevers, +minimally responsive. Pt has a MOLST that says DNR/DNI and daughter on the way. On exam pt in severe respiratory distress, not answering Qs, respiratory called immediately and pt placed on BIPAP with a bear, +coarse breath sounds bilaterally, RRR, no edema. COVID and sepsis protocol initiated, pt placed on bipap, d/w daughter re: GOC, Palliative consulted, possible hospice. Dr. Tee: 89F recently covid + per EMS and just went to SNF, BIBEMS in respiratory distress, satting 70%, RR 40, +fevers, +minimally responsive. Pt has a MOLST that says DNR/DNI and daughter on the way. On exam pt in severe respiratory distress, not answering Qs, respiratory called immediately and pt placed on BIPAP with a bear, +coarse breath sounds bilaterally, RRR, no edema. COVID and sepsis protocol initiated, pt placed on bipap, d/w daughter re: GOC, Palliative consulted, possible hospice.    13:36: Doc to doc done with Hospice physician Dr. Escobar and pt is accepted to inpatient hospice at [facility].

## 2021-01-20 NOTE — CONSULT NOTE ADULT - SUBJECTIVE AND OBJECTIVE BOX
· HPI : pt 88 yo f recent admission to [facility] for pneumonia and c.diff  and was in facility for 2 days and as per pts daughter pt has not been able to communicate but today came to ED for respiratory distress sating 70% on RA and tachypneic to 50's. as per facility pt tested COVID + yesterday but no official results in the computer system. pt DNR/DNI        PAST MEDICAL & SURGICAL HISTORY:  Pneumonia    Dementia    Enterocolitis due to Clostridioides difficile    Autoimmune hepatitis    Hypothyroid    Depression    HTN (hypertension)        SOCIAL HISTORY:    Admitted from:   [facility] [place]   Substance abuse history:              Tobacco hx:                  Alcohol hx:              Home Opioid hx:  Yarsani:                                    Preferred Language:    Surrogate/HCP/:  Daughter  Amina             Phone#: H#  [phone]  C#  [phone]    FAMILY HISTORY:    Baseline ADLs (prior to admission):    Allergies    No Known Allergies    Intolerances      Present Symptoms:   Dyspnea:   Nausea/Vomiting:   Anxiety:  Depressed   Fatigue:  Loss of appetite:   Pain:                                location:          Review of Systems:  Unable to obtain due to poor mentation/distress    MEDICATIONS  (STANDING):    MEDICATIONS  (PRN):  morphine  - Injectable 2 milliGRAM(s) IV Push every 4 hours PRN Moderate Pain (4 - 6)      PHYSICAL EXAM:    Vital Signs Last 24 Hrs  T(C): 39.6 (20 Jan 2021 12:29), Max: 39.6 (20 Jan 2021 12:29)  T(F): 103.2 (20 Jan 2021 12:29), Max: 103.2 (20 Jan 2021 12:29)  HR: 100 (20 Jan 2021 14:43) (56 - 121)  BP: 75/43 (20 Jan 2021 14:43) (75/43 - 116/63)  BP(mean): 51 (20 Jan 2021 14:43) (51 - 76)  RR: 40 (20 Jan 2021 14:43) (40 - 57)  SpO2: 97% (20 Jan 2021 14:43) (96% - 100%)    General: alert   distressed   nonverbal    Karnofsky Performance Score/Palliative Performance Status Version2:   10  %  PPSV: 10%  HEENT: normal  dry mouth    Lungs:  tachypnea/labored breathing    CV: tachy  Oral intake ability: unable  Diet: [NPO]    LABS:                        10.8   23.88 )-----------( 213      ( 20 Jan 2021 12:26 )             33.2     01-20    141  |  104  |  36<H>  ----------------------------<  177<H>  4.7   |  20<L>  |  1.50<H>    Ca    9.0      20 Jan 2021 12:26    TPro  8.5<H>  /  Alb  2.2<L>  /  TBili  0.8  /  DBili  x   /  AST  286<H>  /  ALT  81<H>  /  AlkPhos  93  01-20        RADIOLOGY & ADDITIONAL STUDIES: < from: Xray Chest 1 View- PORTABLE-Urgent (01.20.21 @ 13:02) >    EXAM:  XR CHEST PORTABLE URGENT 1V      PROCEDURE DATE:  01/20/2021        INTERPRETATION:  INDICATION: Shortness of Breath, Cough,  Fever    PRIORS: None    VIEWS: Portable AP radiography of the chest performed.    FINDINGS: Heart size appears within normal limits. No superior mediastinal abnormalities are identified. Bilateral lung parenchymal infiltrates are identified, right greater than left. No evidence for pleural effusion. No pneumothorax. No mediastinal shift. No acute osseous fractures.    IMPRESSION: Bilateral lung parenchymal infiltrates, right greater than left.        ADVANCE DIRECTIVES: MOLST   DNR/DNI  NO feeding tubes   Advanced Care Planning discussion total time spent:

## 2021-01-20 NOTE — ED PROVIDER NOTE - CARE PLAN
Principal Discharge DX:	Respiratory distress  Secondary Diagnosis:	HCAP (healthcare-associated pneumonia)  Secondary Diagnosis:	Hypoxia   Principal Discharge DX:	Respiratory distress  Secondary Diagnosis:	HCAP (healthcare-associated pneumonia)  Secondary Diagnosis:	Hypoxia  Secondary Diagnosis:	ST elevation myocardial infarction (STEMI), unspecified artery  Secondary Diagnosis:	Severe sepsis  Secondary Diagnosis:	Hospice care

## 2021-01-20 NOTE — ED PROVIDER NOTE - ATTENDING CONTRIBUTION TO CARE
Dr. Tee: I performed a face to face bedside interview with patient regarding history of present illness, review of symptoms and past medical history. I completed an independent physical exam.  I have discussed patient's plan of care with PA.   I agree with note as stated above, having amended the EMR as needed to reflect my findings.   This includes HISTORY OF PRESENT ILLNESS, HIV, PAST MEDICAL/SURGICAL/FAMILY/SOCIAL HISTORY, ALLERGIES AND HOME MEDICATIONS, REVIEW OF SYSTEMS, PHYSICAL EXAM, and any PROGRESS NOTES during the time I functioned as the attending physician for this patient.    see mdm

## 2021-01-21 VITALS — OXYGEN SATURATION: 97 % | RESPIRATION RATE: 38 BRPM

## 2021-01-21 PROBLEM — N39.0 URINARY TRACT INFECTION, SITE NOT SPECIFIED: Chronic | Status: ACTIVE | Noted: 2021-01-13

## 2021-01-21 PROBLEM — Z90.5 ACQUIRED ABSENCE OF KIDNEY: Chronic | Status: ACTIVE | Noted: 2021-01-13

## 2021-01-21 PROCEDURE — 99291 CRITICAL CARE FIRST HOUR: CPT | Mod: 25

## 2021-01-21 PROCEDURE — 96375 TX/PRO/DX INJ NEW DRUG ADDON: CPT

## 2021-01-21 PROCEDURE — 84484 ASSAY OF TROPONIN QUANT: CPT

## 2021-01-21 PROCEDURE — 85379 FIBRIN DEGRADATION QUANT: CPT

## 2021-01-21 PROCEDURE — 71045 X-RAY EXAM CHEST 1 VIEW: CPT

## 2021-01-21 PROCEDURE — 84145 PROCALCITONIN (PCT): CPT

## 2021-01-21 PROCEDURE — 80053 COMPREHEN METABOLIC PANEL: CPT

## 2021-01-21 PROCEDURE — 96374 THER/PROPH/DIAG INJ IV PUSH: CPT

## 2021-01-21 PROCEDURE — 85025 COMPLETE CBC W/AUTO DIFF WBC: CPT

## 2021-01-21 PROCEDURE — 85610 PROTHROMBIN TIME: CPT

## 2021-01-21 PROCEDURE — U0005: CPT

## 2021-01-21 PROCEDURE — 86769 SARS-COV-2 COVID-19 ANTIBODY: CPT

## 2021-01-21 PROCEDURE — 36415 COLL VENOUS BLD VENIPUNCTURE: CPT

## 2021-01-21 PROCEDURE — U0003: CPT

## 2021-01-21 PROCEDURE — 83880 ASSAY OF NATRIURETIC PEPTIDE: CPT

## 2021-01-21 PROCEDURE — 86140 C-REACTIVE PROTEIN: CPT

## 2021-01-21 PROCEDURE — 93005 ELECTROCARDIOGRAM TRACING: CPT

## 2021-01-21 PROCEDURE — 36600 WITHDRAWAL OF ARTERIAL BLOOD: CPT

## 2021-01-21 PROCEDURE — 82728 ASSAY OF FERRITIN: CPT

## 2021-01-21 PROCEDURE — 82962 GLUCOSE BLOOD TEST: CPT

## 2021-01-21 PROCEDURE — 82803 BLOOD GASES ANY COMBINATION: CPT

## 2021-01-21 PROCEDURE — 83605 ASSAY OF LACTIC ACID: CPT

## 2021-01-21 PROCEDURE — 85730 THROMBOPLASTIN TIME PARTIAL: CPT

## 2021-01-21 PROCEDURE — 94660 CPAP INITIATION&MGMT: CPT

## 2021-01-21 PROCEDURE — 87040 BLOOD CULTURE FOR BACTERIA: CPT

## 2021-01-21 RX ORDER — ESCITALOPRAM OXALATE 10 MG/1
0 TABLET, FILM COATED ORAL
Qty: 0 | Refills: 0 | DISCHARGE

## 2021-01-21 RX ORDER — VANCOMYCIN HCL 1 G
0 VIAL (EA) INTRAVENOUS
Qty: 0 | Refills: 0 | DISCHARGE

## 2021-01-21 RX ORDER — METOPROLOL TARTRATE 50 MG
1 TABLET ORAL
Qty: 0 | Refills: 0 | DISCHARGE

## 2021-01-21 RX ORDER — LEVOTHYROXINE SODIUM 125 MCG
1 TABLET ORAL
Qty: 0 | Refills: 0 | DISCHARGE

## 2021-01-21 RX ADMIN — MORPHINE SULFATE 2 MILLIGRAM(S): 50 CAPSULE, EXTENDED RELEASE ORAL at 02:35

## 2021-01-21 RX ADMIN — Medication 4 DROP(S): at 02:35

## 2021-01-21 NOTE — DISCHARGE NOTE FOR THE EXPIRED PATIENT - HOSPITAL COURSE
89F PMH of dementia, depression HTN, hypothyroidism, congential single kidney, autoimmune hepatitis presents with respiratory distress SpO2 70% on RA and tachypneic to the 50s, found to have STEMI, severe sepsis and acute hypoxic respiratory failure probably due to HCAP. Daughter declined intervention requested comfort care, patient admitted for inpatient hospice/end of life care.    89F PMH of dementia, depression HTN, hypothyroidism, congential single kidney, autoimmune hepatitis presents with respiratory distress SpO2 70% on RA and tachypneic to the 50s, found to have STEMI, severe sepsis and acute hypoxic respiratory failure probably due to HCAP. Daughter declined intervention requested comfort care, patient admitted for inpatient hospice/end of life care. Pt  21 440AM. Daughter  notified. Attending notified.

## 2021-01-21 NOTE — PROVIDER CONTACT NOTE (OTHER) - ASSESSMENT
pt has SOB, tarchypnea and tarchycardia. o2 sat below 90% with 40%VM  pt is DNR/DNI. inpatient hospice care now.

## 2021-01-21 NOTE — CHART NOTE - NSCHARTNOTEFT_GEN_A_CORE
Alerted by RN that PCA found patient not breathing/pulseless on routine vital check. Pt DNR/DNI on inpatient hospice care with end of life/comfort measures. I assessed pt myself who had absent breath sounds/pulses. Spoke with pt's daughter, Amina. Made aware of situation. Daughter refused autopsy/organ donation.

## 2021-10-01 NOTE — PROGRESS NOTE ADULT - NUTRITIONAL ASSESSMENT
Additional Area 5 Location: anterior right platysma
This patient has been assessed with a concern for Malnutrition and has been determined to have a diagnosis/diagnoses of Moderate protein-calorie malnutrition.    This patient is being managed with:   Diet Dysphagia 1 Pureed-Honey Consistency Fluid-  Supplement Feeding Modality:  Oral  Ensure Enlive Servings Per Day:  1       Frequency:  Daily  Ensure Pudding Cans or Servings Per Day:  1       Frequency:  Daily  Entered: Jan 13 2021  4:21PM    
This patient has been assessed with a concern for Malnutrition and has been determined to have a diagnosis/diagnoses of Moderate protein-calorie malnutrition.    This patient is being managed with:   Diet Dysphagia 1 Pureed-Honey Consistency Fluid-  Supplement Feeding Modality:  Oral  Ensure Enlive Servings Per Day:  1       Frequency:  Daily  Ensure Pudding Cans or Servings Per Day:  1       Frequency:  Daily  Entered: Jan 13 2021  4:21PM

## 2022-02-03 NOTE — SWALLOW BEDSIDE ASSESSMENT ADULT - SWALLOW EVAL: STRUCTURAL ABNORMALITIES
none present Split-Thickness Skin Graft Text: The defect edges were debeveled with a #15 scalpel blade.  Given the location of the defect, shape of the defect and the proximity to free margins a split thickness skin graft was deemed most appropriate.  Using a sterile surgical marker, the primary defect shape was transferred to the donor site. The split thickness graft was then harvested.  The skin graft was then placed in the primary defect and oriented appropriately.

## 2022-03-19 NOTE — H&P ADULT - NSICDXPASTSURGICALHX_GEN_ALL_CORE_FT
PAST SURGICAL HISTORY:  H/O abdominal hysterectomy     History of cholecystectomy     History of repair of hip fracture left 11/14/2020    S/P appy      normal...

## 2022-10-22 NOTE — PROGRESS NOTE ADULT - PROBLEM SELECTOR PLAN 6
Molst in chart.  Patient DNR/DNI

## 2022-11-03 NOTE — PATIENT PROFILE ADULT - NSTRANSFERBELONGINGSDISPO_GEN_A_NUR
Returns for follow-up.  He prefers his daughter to translate and she does nicely.  He is here for follow-up after VNG testing.    He has an asymmetric left greater than right sensorineural hearing loss which looks noise induced with notched pattern.  Also chronic serous otitis media on the left side which had improved at least partially when we saw him last.  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  Might be getting occasional dizziness still.  Still has some left ear fullness which improves temporarily after he auto inflates or blows his nose.  Nasopharynx exam showed no mass.    Exam: Both ear canals are unremarkable..  Right TM looks fine.  Left TM shows a mixture of air bubbles and serous fluid behind but no erythema or perforation or cholesteatoma.    Impression: Chronic left serous otitis media.  Asymmetric left sensorineural hearing loss might be related to previous asymmetric noise exposure but this should be followed.  Evidence of left-sided BPPV on recent VNG testing with occasional dizziness or vertigo briefly when getting out of bed for example.      Recommendation: Patient would like to try physical therapy for possible particle repositioning to treat his BPPV.  We did discuss the potential benefit of having a left PE tube placed in the office or under general anesthesia, he will consider this but in the meantime can try Flonase again as this may have helped in the past.  In any case I would like him to return in 3 months for exam and audiogram to follow the asymmetry.   not applicable

## 2023-02-23 NOTE — ED ADULT NURSE NOTE - AS SC BRADEN SENSORY
[facility] APPOINTMENT CONFIRMATION      Date: 3/2/23    Time: 3PM    Provider name: Dr. Dennis Chou    Location: Neurology Locations: [address] [place], [place] [id number]    Type: In-office visit    Zoom link sent (if applicable): N/A    Is patient currently in a facility? No      Appointment confirmed: Yes    \"We strongly encourage only one visitor to accompany the patient. To ensure [facility].\"     COVID Universal Screening Question    “Do you have a fever, cough, chills, vomiting, diarrhea, headache, rash or runny nose?” Yes/No: No     If yes, patient does have a rash, fever and flu-like symptoms. Please send encounter to the clinical support pool.     Covid-19 Screening questionnaire complete: Yes/No/NA: Yes    \"We do have free parking available in the main hospital parking lot. However, parking can be difficult to find so we ask that you arrive 40 minutes prior to your appointment.\"    
(4) no impairment

## 2023-04-24 NOTE — ED PROVIDER NOTE - TIMING
Pre op history and physical exam - UROLOGY      CHIEF COMPLAINT  I have a growth in my bladder    history of present illness      Alberta is a 77 year old female admitted to the hospital for Bladder cancer.  Patient has history of bladder cancer and has had a recent cystoscopy showing regrowth in the area the bladder.  Because of this patient is requiring surgical intervention with resection.  Patient does have a complicated history from a medical point of view with COPD as well as cardiac issues and has gone through clearance by seeing all the different specialties.  She is now being admitted for a TURBT    Past Medical History      Nuclear sclerotic cataract of both eyes                       Cortical senile cataract, bilateral                           Arcus senilis of both corneas                                 Dermatochalasis                                               Presbyopia                                                    Myopia                                                        Astigmatism                                                   COPD (chronic obstructive pulmonary disease) (*               Bladder cancer (CMD)                                          Tachycardia                                                   PAST Surgical history      GALLBLADDER SURGERY                                           BREAST BIOPSY                                   2004          BLADDER TUMOR EXCISION                          2005          CATARACT EXTRACTION W/ INTRAOCULAR LENS IMPLANT 12/06/2017      Comment: Dr. Hampton    CATARACT EXTRACTION W/  INTRAOCULAR LENS IMPLA* 12/20/2017      Comment: Dr. Welch    OUTPATIENT MEDICATIONS  Current Outpatient Medications   Medication Sig   • theophylline (CONCHITA-24) 300 MG 24 hr capsule Take 600 mg by mouth daily.   • alendronate (FOSAMAX) 35 MG tablet    • atorvastatin (LIPITOR) 10 MG tablet    • ipratropium-albuterol (DUONEB) 0.5-2.5 (3) MG/3ML nebulizer  solution Take 3 mLs by nebulization every 6 hours as needed for Wheezing.   • Stiolto Respimat 2.5-2.5 MCG/ACT inhaler INHALE 2 PUFFS BY MOUTH EVERY 24 HOURS IN PLACE OF SPIRIVA AND ADVAIR   • Acetaminophen 500 MG Cap Take by mouth as needed.    • albuterol 108 (90 Base) MCG/ACT inhaler Inhale 2 puffs into the lungs every 4 hours as needed for Shortness of Breath or Wheezing.     No current facility-administered medications for this visit.       Allergies    ALLERGIES:   Allergen Reactions   • Aspirin HIVES   • Tape [Adhesive   (Environmental)] RASH       Family history    Family History   Problem Relation Age of Onset   • Diabetes Mother    • Hypertension Mother    • Heart disease Father    • Amblyopia Sister    • Cancer Sister    • Cancer, Lung Sister    • Cataracts Brother        Social history  Social History     Occupational History   • Not on file   Tobacco Use   • Smoking status: Former     Current packs/day: 0.00     Types: Cigarettes     Start date: 10/24/1960     Quit date: 10/24/2009     Years since quittin.5   • Smokeless tobacco: Never   Vaping Use   • Vaping status: never used   Substance and Sexual Activity   • Alcohol use: No   • Drug use: No   • Sexual activity: Not on file           Review of Systems    A 14 point review of systems was conducted and all systems were negative    Physical exam    Vital Signs:   Visit Vitals  /76   Pulse 88   Temp 97.1 °F (36.2 °C)   Ht 5' 1\" (1.549 m)   Wt 52.6 kg (116 lb)   SpO2 95%   BMI 21.92 kg/m²       General: The patient is well developed, well nourished. No acute distress, nontoxic. Appears stated age.   Neurologic: Alert and oreinted. Normal mood and affect. Cranial Nerves II-XII grossly intact without focal deficits.   Skin: Warm and dry. Exposed areas without rash.   Neck: Supple and symmetric without swelling or tenderness. No lymphadenopathy or jugular venous distention.   Respiratory: Respiratory effort normal. Clear to auscultation  bilaterally without wheeze.  Cardiovascular: Regular rate and rhythm. No murmur.   Back: No costovertebral angle tenderness.  No spinal tenderness or scoliosis.  Abdomen: Bowel sounds normal. Abdomen is soft, non-tender, non-distended. Bladder and kidneys are nonpalpable.  There is no hepatosplenomegaly. There are no other abdominal masses present.  Extremities: No Clubbing, cyanosis, or edema.     ASSESSMENT      Bladder cancer    plan    The patient will undergo a Transurethral resection of bladder tumor in the operating room.  Risks benefits and alternative options have been discussed with the patient, including no treatment.  Risks include but are not limited to bleeding, infection, perforation, treatment failure, need for repeat or alternative procedures, pain/discomfort and catheter left in place.  Also risks of general anesthesia to include heart attack, blood clots, stroke or death.  The patient verbalizes understanding. All questions have been answered to her satisfaction and she elects to proceed.  Patient was instructed to stop all blood thinning medication seven days prior to procedure, including ASA, NSAIDs, Fish Oil, Garlic and Vitamin E.    Total of 21 minutes was involved in performing history and physical, reviewing procedure and risks and benefits and preparing patient for surgery.  Review of notes from Cardiology and pulmonology.    Rico Guerra MD   gradual onset/constant

## 2023-05-10 NOTE — ED PROVIDER NOTE - TOBACCO USE
Never smoker Consent (Marginal Mandibular)/Introductory Paragraph: The rationale for Mohs was explained to the patient and consent was obtained. The risks, benefits and alternatives to therapy were discussed in detail. Specifically, the risks of damage to the marginal mandibular branch of the facial nerve, infection, scarring, bleeding, prolonged wound healing, incomplete removal, allergy to anesthesia, and recurrence were addressed. Prior to the procedure, the treatment site was clearly identified and confirmed by the patient. All components of Universal Protocol/PAUSE Rule completed.

## 2023-05-15 NOTE — PROGRESS NOTE ADULT - PROBLEM SELECTOR PLAN 1
History of. Sternal scar well healed.      AMS and falls likely 2/2 worsening dementia  s/p .5 L NS bolus  fall precautions  out of bed with assistance, ambulate with assistance   nutrition consult   PT consult, will likely need rehab placement   palliative team consult apprec recs  COVID 19 negative  consult neurology Dr Weber   attempted multiple times to call daughter, however phone line was busy, no option to leave a voicemail

## 2023-10-26 NOTE — PATIENT PROFILE ADULT - NSPROPTRIGHTSUPPORTPERSON_GEN_A_NUR
declines 0 = understands/communicates without difficulty 3 = unable to understand or speak (not related to language barrier)

## 2023-12-20 NOTE — CONSULT NOTE ADULT - CONSULT REQUESTED BY NAME
Dr. Navarrete This patient has been assessed with a concern for Malnutrition and has been determined to have a diagnosis/diagnoses of Moderate protein-calorie malnutrition and Underweight (BMI < 19).    This patient is being managed with:   Diet Minced and Moist-  For patients receiving Renal Replacement - No Protein Restr No Conc K No Conc Phos Low Sodium (RENAL)  Supplement Feeding Modality:  Oral  Nepro Cans or Servings Per Day:  1       Frequency:  Two Times a day  Entered: Dec 17 2023  3:39PM   This patient has been assessed with a concern for Malnutrition and has been determined to have a diagnosis/diagnoses of Moderate protein-calorie malnutrition and Underweight (BMI < 19).    This patient is being managed with:   Diet Renal Restrictions-  For patients receiving Renal Replacement - No Protein Restr No Conc K No Conc Phos Low Sodium  Pureed (PUREED)  Supplement Feeding Modality:  Oral  Nepro Cans or Servings Per Day:  1       Frequency:  Daily  Entered: Dec 15 2023  3:37PM   This patient has been assessed with a concern for Malnutrition and has been determined to have a diagnosis/diagnoses of Moderate protein-calorie malnutrition and Underweight (BMI < 19).    This patient is being managed with:   Diet Minced and Moist-  For patients receiving Renal Replacement - No Protein Restr No Conc K No Conc Phos Low Sodium (RENAL)  Supplement Feeding Modality:  Oral  Nepro Cans or Servings Per Day:  1       Frequency:  Two Times a day  Entered: Dec 17 2023  3:39PM   This patient has been assessed with a concern for Malnutrition and has been determined to have a diagnosis/diagnoses of Moderate protein-calorie malnutrition and Underweight (BMI < 19).    This patient is being managed with:   Diet Minced and Moist-  For patients receiving Renal Replacement - No Protein Restr No Conc K No Conc Phos Low Sodium (RENAL)  Supplement Feeding Modality:  Oral  Nepro Cans or Servings Per Day:  1       Frequency:  Two Times a day  Entered: Dec 17 2023  3:39PM   This patient has been assessed with a concern for Malnutrition and has been determined to have a diagnosis/diagnoses of Moderate protein-calorie malnutrition and Underweight (BMI < 19).    This patient is being managed with:   Diet Renal Restrictions-  For patients receiving Renal Replacement - No Protein Restr No Conc K No Conc Phos Low Sodium  Pureed (PUREED)  Supplement Feeding Modality:  Oral  Nepro Cans or Servings Per Day:  1       Frequency:  Daily  Entered: Dec 15 2023  3:37PM   This patient has been assessed with a concern for Malnutrition and has been determined to have a diagnosis/diagnoses of Moderate protein-calorie malnutrition and Underweight (BMI < 19).    This patient is being managed with:   Diet Minced and Moist-  For patients receiving Renal Replacement - No Protein Restr No Conc K No Conc Phos Low Sodium (RENAL)  Supplement Feeding Modality:  Oral  Nepro Cans or Servings Per Day:  1       Frequency:  Two Times a day  Entered: Dec 17 2023  3:39PM   This patient has been assessed with a concern for Malnutrition and has been determined to have a diagnosis/diagnoses of Moderate protein-calorie malnutrition and Underweight (BMI < 19).    This patient is being managed with:   Diet Minced and Moist-  For patients receiving Renal Replacement - No Protein Restr No Conc K No Conc Phos Low Sodium (RENAL)  Supplement Feeding Modality:  Oral  Nepro Cans or Servings Per Day:  1       Frequency:  Two Times a day  Entered: Dec 17 2023  3:39PM   This patient has been assessed with a concern for Malnutrition and has been determined to have a diagnosis/diagnoses of Moderate protein-calorie malnutrition and Underweight (BMI < 19).    This patient is being managed with:   Diet Minced and Moist-  For patients receiving Renal Replacement - No Protein Restr No Conc K No Conc Phos Low Sodium (RENAL)  Supplement Feeding Modality:  Oral  Nepro Cans or Servings Per Day:  1       Frequency:  Two Times a day  Entered: Dec 17 2023  3:39PM   This patient has been assessed with a concern for Malnutrition and has been determined to have a diagnosis/diagnoses of Moderate protein-calorie malnutrition and Underweight (BMI < 19).    This patient is being managed with:   Diet Minced and Moist-  For patients receiving Renal Replacement - No Protein Restr No Conc K No Conc Phos Low Sodium (RENAL)  Supplement Feeding Modality:  Oral  Nepro Cans or Servings Per Day:  1       Frequency:  Two Times a day  Entered: Dec 17 2023  3:39PM   This patient has been assessed with a concern for Malnutrition and has been determined to have a diagnosis/diagnoses of Moderate protein-calorie malnutrition and Underweight (BMI < 19).    This patient is being managed with:   Diet Renal Restrictions-  For patients receiving Renal Replacement - No Protein Restr No Conc K No Conc Phos Low Sodium  Pureed (PUREED)  Supplement Feeding Modality:  Oral  Nepro Cans or Servings Per Day:  1       Frequency:  Daily  Entered: Dec 15 2023  3:37PM   This patient has been assessed with a concern for Malnutrition and has been determined to have a diagnosis/diagnoses of Moderate protein-calorie malnutrition and Underweight (BMI < 19).    This patient is being managed with:   Diet Minced and Moist-  For patients receiving Renal Replacement - No Protein Restr No Conc K No Conc Phos Low Sodium (RENAL)  Supplement Feeding Modality:  Oral  Nepro Cans or Servings Per Day:  1       Frequency:  Two Times a day  Entered: Dec 17 2023  3:39PM

## 2024-04-22 NOTE — ED ADULT NURSE NOTE - EXTENSIONS OF SELF_ADULT
Pulse: 64   Resp:    Temp:    SpO2:          Intake/Output Summary (Last 24 hours) at 4/22/2024 1115  Last data filed at 4/21/2024 2000  Gross per 24 hour   Intake --   Output 350 ml   Net -350 ml        Physical Examination:     I had a face to face encounter with this patient and independently examined them on 4/22/2024 as outlined below:          General : alert x 3, awake, no acute distress,   HEENT: PEERL, EOMI, moist mucus membrane  Neck: supple, no JVD, no meningeal signs  Chest: Clear to auscultation bilaterally   CVS: S1 S2 heard, Capillary refill less than 2 seconds  Abd: soft/ non tender, non distended, BS physiological,   Ext: Leg lengths are equal on exam , no clubbing, no cyanosis, no edema, brisk 2+ DP pulses  Neuro/Psych: pleasant mood and affect, CN 2-12 grossly intact, sensory grossly within normal limit  Skin: warm     Data Review:    Review and/or order of clinical lab test      I have personally and independently reviewed all pertinent labs, diagnostic studies, imaging, and have provided independent interpretation of the same.     Labs:     Recent Labs     04/21/24  0914 04/22/24  0837   WBC 8.8 5.3   HGB 10.8* 9.5*   HCT 35.6 30.8*    154     Recent Labs     04/21/24  0914 04/22/24  0837    142   K 4.2 4.6   * 118*   CO2 23 23   BUN 39* 33*     Recent Labs     04/21/24  0914 04/22/24  0837   ALT 30 24   GLOB 4.0 3.3     No results for input(s): \"INR\", \"APTT\" in the last 72 hours.    Invalid input(s): \"PTP\"   No results for input(s): \"TIBC\", \"FERR\" in the last 72 hours.    Invalid input(s): \"FE\", \"PSAT\"   No results found for: \"FOL\", \"RBCF\"   No results for input(s): \"PH\", \"PCO2\", \"PO2\" in the last 72 hours.  No results for input(s): \"CPK\" in the last 72 hours.    Invalid input(s): \"CPKMB\", \"CKNDX\", \"TROIQ\"  No results found for: \"CHOL\", \"CHOLX\", \"CHLST\", \"CHOLV\", \"HDL\", \"HDLC\", \"LDL\", \"LDLC\", \"TGLX\", \"TRIGL\"  No results found for: \"GLUCPOC\"  [unfilled]    Notes reviewed from 
(HUMALOG) injection vial 0-4 Units  0-4 Units SubCUTAneous Nightly    buPROPion (WELLBUTRIN XL) extended release tablet 300 mg  300 mg Oral Daily    escitalopram (LEXAPRO) tablet 10 mg  10 mg Oral Daily    metoprolol tartrate (LOPRESSOR) tablet 25 mg  25 mg Oral BID     ______________________________________________________________________  EXPECTED LENGTH OF STAY: Unable to retrieve estimated LOS  ACTUAL LENGTH OF STAY:          1                 Camille Kim PA-C    
no abrasions, no jaundice, no lesions, no pruritis, and no rashes.
None

## 2025-03-05 NOTE — PATIENT PROFILE ADULT - MONEY FOR FOOD
You can access the FollowMyHealth Patient Portal offered by [facility] by registering at the following website: [email or web address]. By joining [facility]’s FollowMyHealth portal, you will also be able to view your health information using other applications (apps) compatible with our system.
yes

## 2025-04-23 NOTE — PATIENT PROFILE ADULT - FALL HARM RISK CONCLUSION
Provider E-Visit time total (minutes): Referred to in person/virtual visit.  Less than 5 minutes.        Fall with Harm Risk